# Patient Record
Sex: FEMALE | Race: OTHER | Employment: FULL TIME | URBAN - METROPOLITAN AREA
[De-identification: names, ages, dates, MRNs, and addresses within clinical notes are randomized per-mention and may not be internally consistent; named-entity substitution may affect disease eponyms.]

---

## 2017-01-18 ENCOUNTER — APPOINTMENT (OUTPATIENT)
Dept: OCCUPATIONAL MEDICINE | Age: 62
End: 2017-01-18
Attending: FAMILY MEDICINE

## 2020-03-08 ENCOUNTER — APPOINTMENT (OUTPATIENT)
Dept: GENERAL RADIOLOGY | Facility: HOSPITAL | Age: 65
End: 2020-03-08
Attending: EMERGENCY MEDICINE
Payer: COMMERCIAL

## 2020-03-08 ENCOUNTER — HOSPITAL ENCOUNTER (EMERGENCY)
Facility: HOSPITAL | Age: 65
Discharge: HOME | End: 2020-03-08
Attending: EMERGENCY MEDICINE
Payer: COMMERCIAL

## 2020-03-08 VITALS
WEIGHT: 139 LBS | OXYGEN SATURATION: 99 % | HEART RATE: 57 BPM | SYSTOLIC BLOOD PRESSURE: 184 MMHG | HEIGHT: 61 IN | BODY MASS INDEX: 26.24 KG/M2 | DIASTOLIC BLOOD PRESSURE: 82 MMHG | TEMPERATURE: 99 F | RESPIRATION RATE: 18 BRPM

## 2020-03-08 DIAGNOSIS — M51.36 DEGENERATIVE DISC DISEASE, LUMBAR: Primary | ICD-10-CM

## 2020-03-08 PROCEDURE — 99283 EMERGENCY DEPT VISIT LOW MDM: CPT

## 2020-03-08 PROCEDURE — 72100 X-RAY EXAM L-S SPINE 2/3 VWS: CPT | Performed by: EMERGENCY MEDICINE

## 2020-03-08 PROCEDURE — 96372 THER/PROPH/DIAG INJ SC/IM: CPT

## 2020-03-08 RX ORDER — IBUPROFEN 800 MG/1
800 TABLET ORAL EVERY 8 HOURS PRN
COMMUNITY
End: 2020-10-14

## 2020-03-08 RX ORDER — SIMVASTATIN 10 MG
10 TABLET ORAL NIGHTLY
COMMUNITY
End: 2020-03-23

## 2020-03-08 RX ORDER — METHYLPREDNISOLONE 4 MG/1
TABLET ORAL
Qty: 1 PACKAGE | Refills: 0 | Status: SHIPPED | OUTPATIENT
Start: 2020-03-08 | End: 2020-03-17 | Stop reason: ALTCHOICE

## 2020-03-08 RX ORDER — IBUPROFEN 600 MG/1
600 TABLET ORAL EVERY 8 HOURS PRN
Qty: 30 TABLET | Refills: 0 | Status: SHIPPED | OUTPATIENT
Start: 2020-03-08 | End: 2020-03-15

## 2020-03-08 RX ORDER — METOPROLOL TARTRATE 50 MG/1
50 TABLET, FILM COATED ORAL 2 TIMES DAILY
COMMUNITY
End: 2020-03-23

## 2020-03-08 RX ORDER — HYDROCODONE BITARTRATE AND ACETAMINOPHEN 5; 325 MG/1; MG/1
1-2 TABLET ORAL EVERY 8 HOURS PRN
Qty: 20 TABLET | Refills: 0 | Status: SHIPPED | OUTPATIENT
Start: 2020-03-08 | End: 2020-03-15

## 2020-03-08 RX ORDER — KETOROLAC TROMETHAMINE 30 MG/ML
60 INJECTION, SOLUTION INTRAMUSCULAR; INTRAVENOUS ONCE
Status: COMPLETED | OUTPATIENT
Start: 2020-03-08 | End: 2020-03-08

## 2020-03-08 RX ORDER — HYDROCODONE BITARTRATE AND ACETAMINOPHEN 5; 325 MG/1; MG/1
1-2 TABLET ORAL EVERY 6 HOURS PRN
Qty: 20 TABLET | Refills: 0 | Status: SHIPPED | OUTPATIENT
Start: 2020-03-08 | End: 2020-03-08

## 2020-03-08 NOTE — ED PROVIDER NOTES
Patient Seen in: [facility] Emergency Department      History   Patient presents with:  Lower Extremity Injury    Stated Complaint: L lower extremity pain    HPI    The patient is a 51-year-old female presenting to the emergency department due to pa fairly uncomfortable while trying to move around in the stretcher. Neuro: No focal neurologic deficit. No facial droop or slurred speech. CN II-XII intact.  Grossly normal and symmetric motor strength and sensation proximally and distally throughout 4 ex syndrome or neurologic deficits that would warrant emergent advanced imaging such as MRI. MDM           LUMBAR SPINE X-RAY 0356 HOURS  IMPRESSION:  No evidence of acute fracture or dislocation.     Near-complete disc interspace height loss noted at L Tylenol containing products. ). Qty: 20 tablet Refills: 0    !! - Potential duplicate medications found. Please discuss with provider.

## 2020-03-14 ENCOUNTER — HOSPITAL ENCOUNTER (OUTPATIENT)
Age: 65
Discharge: HOME | End: 2020-03-14
Attending: EMERGENCY MEDICINE
Payer: COMMERCIAL

## 2020-03-14 ENCOUNTER — APPOINTMENT (OUTPATIENT)
Dept: GENERAL RADIOLOGY | Age: 65
End: 2020-03-14
Attending: EMERGENCY MEDICINE
Payer: COMMERCIAL

## 2020-03-14 VITALS
HEIGHT: 61 IN | TEMPERATURE: 98 F | HEART RATE: 62 BPM | RESPIRATION RATE: 16 BRPM | DIASTOLIC BLOOD PRESSURE: 63 MMHG | BODY MASS INDEX: 26.24 KG/M2 | SYSTOLIC BLOOD PRESSURE: 171 MMHG | WEIGHT: 139 LBS | OXYGEN SATURATION: 96 %

## 2020-03-14 DIAGNOSIS — M54.9 SEVERE BACK PAIN: Primary | ICD-10-CM

## 2020-03-14 PROCEDURE — 99214 OFFICE O/P EST MOD 30 MIN: CPT

## 2020-03-14 PROCEDURE — 73562 X-RAY EXAM OF KNEE 3: CPT | Performed by: EMERGENCY MEDICINE

## 2020-03-14 PROCEDURE — 73502 X-RAY EXAM HIP UNI 2-3 VIEWS: CPT | Performed by: EMERGENCY MEDICINE

## 2020-03-14 RX ORDER — HYDROCODONE BITARTRATE AND ACETAMINOPHEN 5; 325 MG/1; MG/1
2 TABLET ORAL ONCE
Status: COMPLETED | OUTPATIENT
Start: 2020-03-14 | End: 2020-03-14

## 2020-03-14 RX ORDER — DIAZEPAM 2 MG/1
2 TABLET ORAL 3 TIMES DAILY PRN
Qty: 12 TABLET | Refills: 0 | Status: SHIPPED | OUTPATIENT
Start: 2020-03-14 | End: 2020-03-17 | Stop reason: DRUGHIGH

## 2020-03-14 RX ORDER — HYDROCODONE BITARTRATE AND ACETAMINOPHEN 5; 325 MG/1; MG/1
1-2 TABLET ORAL EVERY 6 HOURS PRN
Qty: 10 TABLET | Refills: 0 | Status: SHIPPED | OUTPATIENT
Start: 2020-03-14 | End: 2020-03-17 | Stop reason: DRUGHIGH

## 2020-03-14 RX ORDER — DIAZEPAM 5 MG/1
5 TABLET ORAL ONCE
Status: COMPLETED | OUTPATIENT
Start: 2020-03-14 | End: 2020-03-14

## 2020-03-14 NOTE — ED PROVIDER NOTES
Patient Seen in: [facility]      History   Patient presents with:  Back Pain    Stated Complaint: follow up on back pain from ER  03/08/2020    HPI    59-year-old woman history of hypertension, here for evaluation of back p elderly woman sitting in a wheelchair no acute distress  Head: Normocephalic and atraumatic. Mouth/Throat:  Mucous membranes are moist.   Cardiovascular:  Normal rate and regular rhythm.   No Edema  Pulmonary:  Pulmonary effort is normal.  Normal breath s this time. FINDINGS:  No acute displaced osseous fracture or dislocation. Near complete disc interspace height loss at L4-5. Mild disc space narrowing noted at T12-L1, L1-L2, and L3-L4. Mild to moderate facet arthropathy. Mild scoliosis.   Scattered discussed.               Disposition and Plan     Clinical Impression:  Severe back pain  (primary encounter diagnosis)    Disposition:  Discharge  3/14/2020  4:27 pm    Follow-up:  Anibal Mike  [address] [place] [address]  [phone]

## 2020-03-14 NOTE — ED INITIAL ASSESSMENT (HPI)
Pt. Here for follow-up to back pain. Was seen in ER 3/8/20. She reports taking her meds, without relief.

## 2020-03-16 ENCOUNTER — TELEPHONE (OUTPATIENT)
Dept: SURGERY | Facility: CLINIC | Age: 65
End: 2020-03-16

## 2020-03-16 NOTE — TELEPHONE ENCOUNTER
LM for patient regarding appt time change. Was seeing Reddy Tavares @ 6 but she has sx to attend. R/s to Mike @ 1pm-okd by Mike.  Asked patient to call back to confirm

## 2020-03-17 ENCOUNTER — OFFICE VISIT (OUTPATIENT)
Dept: SURGERY | Facility: CLINIC | Age: 65
End: 2020-03-17
Payer: COMMERCIAL

## 2020-03-17 VITALS
WEIGHT: 139 LBS | HEIGHT: 64.5 IN | BODY MASS INDEX: 23.44 KG/M2 | DIASTOLIC BLOOD PRESSURE: 90 MMHG | HEART RATE: 69 BPM | SYSTOLIC BLOOD PRESSURE: 170 MMHG

## 2020-03-17 DIAGNOSIS — M47.816 LUMBAR SPONDYLOSIS: Primary | ICD-10-CM

## 2020-03-17 DIAGNOSIS — M51.16 LUMBAR DISC HERNIATION WITH RADICULOPATHY: ICD-10-CM

## 2020-03-17 DIAGNOSIS — R29.898 WEAKNESS OF LEFT LOWER EXTREMITY: ICD-10-CM

## 2020-03-17 PROCEDURE — 99204 OFFICE O/P NEW MOD 45 MIN: CPT | Performed by: PHYSICIAN ASSISTANT

## 2020-03-17 RX ORDER — PREDNISONE 10 MG/1
10 TABLET ORAL DAILY
Qty: 30 TABLET | Refills: 0 | Status: SHIPPED | OUTPATIENT
Start: 2020-03-17 | End: 2020-03-23

## 2020-03-17 RX ORDER — DIAZEPAM 10 MG/1
10 TABLET ORAL EVERY 12 HOURS PRN
Qty: 60 TABLET | Refills: 0 | Status: SHIPPED | OUTPATIENT
Start: 2020-03-17 | End: 2020-03-31

## 2020-03-17 RX ORDER — CALCIUM CARBONATE 200(500)MG
1 TABLET,CHEWABLE ORAL AS NEEDED
COMMUNITY
End: 2020-06-12

## 2020-03-17 RX ORDER — HYDROCODONE BITARTRATE AND ACETAMINOPHEN 10; 325 MG/1; MG/1
1 TABLET ORAL EVERY 4 HOURS PRN
Qty: 60 TABLET | Refills: 0 | Status: SHIPPED | OUTPATIENT
Start: 2020-03-17 | End: 2020-03-31

## 2020-03-17 NOTE — PATIENT INSTRUCTIONS
Refill policies:    • Allow 2-3 business days for refills; controlled substances may take longer.   • Contact your pharmacy at least 5 days prior to running out of medication and have them send an electronic request or submit request through the “request re Depending on your insurance carrier, approval may take 3-10 days. It is highly recommended patients contact their insurance carrier directly to determine coverage.   If test is done without insurance authorization, patient may be responsible for the entire tablet she can take half to full tablet. 3.  If this does not cover pain will consider adding gabapentin  4. MRI lumbar spine. We can call her with the results are we can see her back in April to consider physical therapy or an epidural  5.   She can wor

## 2020-03-17 NOTE — PROGRESS NOTES
Location of Pain: lower back back left side radiating to knee and left foot. Pt states no numbness or tingling. No issues with weakness. No issues with B/B.      Date Pain Began: 1.5 weeks           Work Related:   No        Receiving Work Comp/Disability:

## 2020-03-17 NOTE — PROGRESS NOTES
[facility] Neurosurgery Consultation      HISTORY OF PRESENT Claude Nares is a 59year old female for spinal consultation. She has a history of left leg pain that developed about March 8, 2020 when getting out of bed.   She was seen in the [facility] 30 and toe walk. Negative Spurling's. Negative Vazquez's. Negative straight leg raise bilaterally. No pain on internal extra rotation of the hips.   She is tender along the left iliotibial band and greater trochanteric bursa but that does not reproduce her s will consider adding gabapentin  4. MRI lumbar spine. We can call her with the results are we can see her back in April to consider physical therapy or an epidural  5. She can work as tolerated    Images were reviewed her questions were answered    TAndreina  L

## 2020-03-22 ENCOUNTER — HOSPITAL ENCOUNTER (EMERGENCY)
Facility: HOSPITAL | Age: 65
Discharge: HOME | End: 2020-03-22
Attending: EMERGENCY MEDICINE
Payer: COMMERCIAL

## 2020-03-22 ENCOUNTER — APPOINTMENT (OUTPATIENT)
Dept: MRI IMAGING | Facility: HOSPITAL | Age: 65
End: 2020-03-22
Attending: EMERGENCY MEDICINE
Payer: COMMERCIAL

## 2020-03-22 VITALS
BODY MASS INDEX: 26.24 KG/M2 | SYSTOLIC BLOOD PRESSURE: 182 MMHG | HEIGHT: 61 IN | DIASTOLIC BLOOD PRESSURE: 82 MMHG | OXYGEN SATURATION: 97 % | RESPIRATION RATE: 15 BRPM | TEMPERATURE: 98 F | WEIGHT: 139 LBS | HEART RATE: 70 BPM

## 2020-03-22 DIAGNOSIS — M54.16 LUMBAR RADICULOPATHY: Primary | ICD-10-CM

## 2020-03-22 PROCEDURE — 96374 THER/PROPH/DIAG INJ IV PUSH: CPT

## 2020-03-22 PROCEDURE — 99284 EMERGENCY DEPT VISIT MOD MDM: CPT

## 2020-03-22 PROCEDURE — 72148 MRI LUMBAR SPINE W/O DYE: CPT | Performed by: EMERGENCY MEDICINE

## 2020-03-22 PROCEDURE — 96376 TX/PRO/DX INJ SAME DRUG ADON: CPT

## 2020-03-22 RX ORDER — MORPHINE SULFATE 4 MG/ML
4 INJECTION, SOLUTION INTRAMUSCULAR; INTRAVENOUS EVERY 30 MIN PRN
Status: DISCONTINUED | OUTPATIENT
Start: 2020-03-22 | End: 2020-03-22

## 2020-03-22 NOTE — ED NOTES
Pt reevaluated by er physician. Informed of all her test reports and plan of care.  Pt vebralizing understanding

## 2020-03-22 NOTE — ED PROVIDER NOTES
Patient Seen in: [facility] Emergency Department      History   Patient presents with:  Back Pain    Stated Complaint: Back pain, degenerative disc disease.      HPI    Patient presents complaining of increasing back pain with radiation down to her l wheezing. Abdominal:      General: Bowel sounds are normal. There is no distension. Palpations: Abdomen is soft. Tenderness: There is no tenderness. There is no guarding.    Neurological:      Mental Status: She is alert and oriented to person, follow up        Medications Prescribed:  Discharge Medication List as of 3/22/2020 12:12 PM

## 2020-03-22 NOTE — ED INITIAL ASSESSMENT (HPI)
Pt presents to ed with chronic back pain with radiation to left leg, pt was seen at [facility] yesterday where xray was unremarkable. Pt took norco at 0530 and is on prednisone.

## 2020-03-23 ENCOUNTER — HOSPITAL ENCOUNTER (OUTPATIENT)
Facility: HOSPITAL | Age: 65
Setting detail: OBSERVATION
Discharge: HOME | End: 2020-03-24
Attending: EMERGENCY MEDICINE | Admitting: HOSPITALIST
Payer: COMMERCIAL

## 2020-03-23 ENCOUNTER — TELEPHONE (OUTPATIENT)
Dept: NEUROLOGY | Facility: CLINIC | Age: 65
End: 2020-03-23

## 2020-03-23 DIAGNOSIS — M54.32 SCIATICA OF LEFT SIDE: ICD-10-CM

## 2020-03-23 DIAGNOSIS — M54.59 INTRACTABLE LOW BACK PAIN: Primary | ICD-10-CM

## 2020-03-23 DIAGNOSIS — R29.898 WEAKNESS OF LEFT LOWER EXTREMITY: ICD-10-CM

## 2020-03-23 RX ORDER — DOXEPIN HYDROCHLORIDE 50 MG/1
1 CAPSULE ORAL DAILY
Status: DISCONTINUED | OUTPATIENT
Start: 2020-03-24 | End: 2020-03-24

## 2020-03-23 RX ORDER — PREDNISONE 10 MG/1
20 TABLET ORAL DAILY
Status: ON HOLD | COMMUNITY
Start: 2020-03-26 | End: 2020-03-24

## 2020-03-23 RX ORDER — PREDNISONE 10 MG/1
30 TABLET ORAL DAILY
Status: ON HOLD | COMMUNITY
Start: 2020-03-23 | End: 2020-03-24

## 2020-03-23 RX ORDER — PREDNISONE 10 MG/1
10 TABLET ORAL DAILY
Status: ON HOLD | COMMUNITY
Start: 2020-03-28 | End: 2020-03-24

## 2020-03-23 RX ORDER — KETOROLAC TROMETHAMINE 30 MG/ML
30 INJECTION, SOLUTION INTRAMUSCULAR; INTRAVENOUS EVERY 6 HOURS PRN
Status: DISCONTINUED | OUTPATIENT
Start: 2020-03-23 | End: 2020-03-24

## 2020-03-23 RX ORDER — METOPROLOL SUCCINATE 50 MG/1
50 TABLET, EXTENDED RELEASE ORAL
Status: DISCONTINUED | OUTPATIENT
Start: 2020-03-24 | End: 2020-03-24

## 2020-03-23 RX ORDER — DIAZEPAM 5 MG/1
10 TABLET ORAL EVERY 12 HOURS PRN
Status: DISCONTINUED | OUTPATIENT
Start: 2020-03-23 | End: 2020-03-24

## 2020-03-23 RX ORDER — METOPROLOL SUCCINATE 50 MG/1
50 TABLET, EXTENDED RELEASE ORAL DAILY
COMMUNITY
End: 2020-06-26

## 2020-03-23 RX ORDER — MORPHINE SULFATE 4 MG/ML
4 INJECTION, SOLUTION INTRAMUSCULAR; INTRAVENOUS ONCE
Status: COMPLETED | OUTPATIENT
Start: 2020-03-23 | End: 2020-03-23

## 2020-03-23 RX ORDER — METOCLOPRAMIDE HYDROCHLORIDE 5 MG/ML
10 INJECTION INTRAMUSCULAR; INTRAVENOUS EVERY 8 HOURS PRN
Status: DISCONTINUED | OUTPATIENT
Start: 2020-03-23 | End: 2020-03-24

## 2020-03-23 RX ORDER — METHYLPREDNISOLONE SODIUM SUCCINATE 40 MG/ML
40 INJECTION, POWDER, LYOPHILIZED, FOR SOLUTION INTRAMUSCULAR; INTRAVENOUS EVERY 12 HOURS
Status: DISCONTINUED | OUTPATIENT
Start: 2020-03-23 | End: 2020-03-24

## 2020-03-23 RX ORDER — KETOROLAC TROMETHAMINE 15 MG/ML
15 INJECTION, SOLUTION INTRAMUSCULAR; INTRAVENOUS EVERY 6 HOURS PRN
Status: DISCONTINUED | OUTPATIENT
Start: 2020-03-23 | End: 2020-03-24

## 2020-03-23 RX ORDER — MULTIVITAMIN WITH FOLIC ACID 400 MCG
1 TABLET ORAL DAILY
COMMUNITY

## 2020-03-23 RX ORDER — HYDROCODONE BITARTRATE AND ACETAMINOPHEN 10; 325 MG/1; MG/1
1 TABLET ORAL EVERY 4 HOURS PRN
Status: DISCONTINUED | OUTPATIENT
Start: 2020-03-23 | End: 2020-03-24

## 2020-03-23 RX ORDER — ONDANSETRON 2 MG/ML
4 INJECTION INTRAMUSCULAR; INTRAVENOUS EVERY 6 HOURS PRN
Status: DISCONTINUED | OUTPATIENT
Start: 2020-03-23 | End: 2020-03-24

## 2020-03-23 NOTE — TELEPHONE ENCOUNTER
Provider message noted. Placed reminder for need for new imaging order in 30 days, (and possibly reevaluate need for peer to peer).       At [address] [place] [address] on 3/17/20 per CIARRA Adler:    \"ASSESSMENT:  1. L4-5 L5-S1 spondylosis  2.   Left leg radiculopathy L

## 2020-03-23 NOTE — TELEPHONE ENCOUNTER
Prior auth for lumbar MRI [id number] Denied     Patient has scheduled this MRI on 03-    Case# [id number]    In the event that your physician or health care professional (provider) continues to disagree with this non-coverage decision, he/she may call to [phone] Expedited (urgent) appeal fax: [phone]

## 2020-03-23 NOTE — TELEPHONE ENCOUNTER
Let's wait on the noij-al-wwqhp for approval. In general, authorization is only good for thirty days. The authorization may  before the ability to resume normal operations.

## 2020-03-24 VITALS
TEMPERATURE: 98 F | DIASTOLIC BLOOD PRESSURE: 81 MMHG | SYSTOLIC BLOOD PRESSURE: 162 MMHG | HEART RATE: 70 BPM | OXYGEN SATURATION: 98 % | RESPIRATION RATE: 22 BRPM

## 2020-03-24 PROCEDURE — 99217 OBSERVATION CARE DISCHARGE: CPT | Performed by: HOSPITALIST

## 2020-03-24 RX ORDER — PREDNISONE 20 MG/1
40 TABLET ORAL DAILY
Qty: 8 TABLET | Refills: 0 | Status: SHIPPED | OUTPATIENT
Start: 2020-03-24 | End: 2020-03-28

## 2020-03-24 NOTE — PROGRESS NOTES
NURSING ADMISSION NOTE      Patient admitted via CART  Oriented to room. Safety precautions initiated. Bed in low position. Call light in reach.

## 2020-03-24 NOTE — PHYSICAL THERAPY NOTE
PHYSICAL THERAPY QUICK EVALUATION - INPATIENT    Room Number: 522/522-A  Evaluation Date: 3/24/2020  Presenting Problem: intractable back and left LE pain  Physician Order: PT Eval and Treat    Problem List  Principal Problem:    Intractable low back mable None   -   Moving from lying on back to sitting on the side of the bed?: None   How much help from another person does the patient currently need. ..   -   Moving to and from a bed to a chair (including a wheelchair)?: None   -   Need to walk in hospital ro educated in frequent position changes, cont of ankle pumps for DVT prevention, and activity recommendations. Pt able to adhere to spine precautions with all activity, and demonstrates good understanding of above.     .  Based on this evaluation, patient's cl

## 2020-03-24 NOTE — ED PROVIDER NOTES
Patient Seen in: [facility] Emergency Department      History   Patient presents with:  Back Pain    Stated Complaint: back pain/left leg pain    HPI    79-year-old female presents emergency department who states was seen in the emergency department rub  Respiratory: Clear to auscultation bilaterally no crackles no wheezes no accessory muscle use  Abdomen: Soft nontender nondistended, no rebound no guarding  no hepatosplenomegaly bowel sounds are present , no pulsatile mass  Extremities: No clubbing c

## 2020-03-24 NOTE — PLAN OF CARE
States pain adequately controlled. Denies need for pain medication today. Sitting up in chair, participated with physical therapy. States tolerated activity well. States ready to go home today.   Instructed on plan of care, pain management, call for all

## 2020-03-24 NOTE — H&P
[facility]  History and Physical     Mannfordyemi Barnett Patient Status:  Observation    10/22/1955 MRN [id number]   [facility] 5NW-A Attending Beverley Dennis MD   Hosp Day # 0 PCP Kelsea Suh MD     Chief Complaint: Catherine Patterson Disp: , Rfl:   Calcium Carbonate Antacid 500 MG Oral Chew Tab, Chew 1 tablet by mouth as needed. , Disp: , Rfl:   Olive Leaf Extract 500 MG Oral Cap, Take 500 mg by mouth daily.   , Disp: , Rfl:   HYDROcodone-acetaminophen (NORCO)  MG Oral Tab, Take 1 for input(s): GLU, BUN, CREATSERUM, GFRAA, GFRNAA, CA, ALB, NA, K, CL, CO2, ALKPHO, AST, ALT, BILT, TP in the last 168 hours. CrCl cannot be calculated (No successful lab value found. ). No results for input(s): PTP, INR in the last 168 hours.     No r

## 2020-03-24 NOTE — PROGRESS NOTES
[facility]  Progress Note     Abi Render Patient Status:  Observation    10/22/1955 MRN [id number]   [facility] 5NW-A Attending Lewis Guerin MD   Hosp Day # 0 PCP Christina Rajput MD     Chief Complaint: back pain    S: lumbar spine and left paracentral disc sequestration at L3-L4 and moderate right neural foraminal stenosis L4-L5   - steroids   - Pain meds   - antiinflamm  2.   Hypertension stable on home medicines       Plan of care: dc home    Quality:  · DVT Prophylaxi

## 2020-03-24 NOTE — ED INITIAL ASSESSMENT (HPI)
Pt c/o of back pain. Pt was seen in ED this past week for pain. Pt stated she has scheduled MRI on Wednesday. Pt stated she took her prednisone and motrin around 1730 this evening. Pt is aox4, laying on stretcher.

## 2020-03-24 NOTE — PLAN OF CARE
States ready to go home. Has denied need for pain medication today. States tolerating activity well. Verbal and written discharge orders reviewed. Verbalized understanding.   Discharged with prescription for prednisone & walker issued by physical therap

## 2020-03-24 NOTE — PROGRESS NOTES
[facility] [address]  [place], [address]  ?  03/24/20  ? Re: Caridad GibbonsFlorina  ? To Whom It May Concern:    Caridad Heaton was admitted to [facility] from 3/23/2020 to 03/24/20.     Please excuse Caridad Heaton

## 2020-03-24 NOTE — PLAN OF CARE
V/S stable,B/P elevated,will monitor,better now than previous,pain medication was given. Plan of care discussed,Instructed to call for help or assist,safety measures discussed. Will monitor.

## 2020-03-26 ENCOUNTER — TELEPHONE (OUTPATIENT)
Dept: SURGERY | Facility: CLINIC | Age: 65
End: 2020-03-26

## 2020-03-26 ENCOUNTER — HOSPITAL ENCOUNTER (EMERGENCY)
Facility: HOSPITAL | Age: 65
Discharge: HOME | End: 2020-03-26
Attending: EMERGENCY MEDICINE
Payer: COMMERCIAL

## 2020-03-26 VITALS
SYSTOLIC BLOOD PRESSURE: 164 MMHG | OXYGEN SATURATION: 98 % | RESPIRATION RATE: 20 BRPM | WEIGHT: 139 LBS | HEART RATE: 70 BPM | DIASTOLIC BLOOD PRESSURE: 88 MMHG | HEIGHT: 65 IN | TEMPERATURE: 97 F | BODY MASS INDEX: 23.16 KG/M2

## 2020-03-26 DIAGNOSIS — M54.16 LUMBAR RADICULOPATHY: Primary | ICD-10-CM

## 2020-03-26 PROCEDURE — 96372 THER/PROPH/DIAG INJ SC/IM: CPT

## 2020-03-26 PROCEDURE — 99283 EMERGENCY DEPT VISIT LOW MDM: CPT

## 2020-03-26 PROCEDURE — 99285 EMERGENCY DEPT VISIT HI MDM: CPT

## 2020-03-26 RX ORDER — GABAPENTIN 300 MG/1
300 CAPSULE ORAL 3 TIMES DAILY
Qty: 90 CAPSULE | Refills: 1 | Status: SHIPPED | OUTPATIENT
Start: 2020-03-26 | End: 2020-05-18

## 2020-03-26 RX ORDER — DEXAMETHASONE SODIUM PHOSPHATE 4 MG/ML
10 VIAL (ML) INJECTION ONCE
Status: COMPLETED | OUTPATIENT
Start: 2020-03-26 | End: 2020-03-26

## 2020-03-26 RX ORDER — DIAZEPAM 5 MG/1
5 TABLET ORAL ONCE
Status: DISCONTINUED | OUTPATIENT
Start: 2020-03-26 | End: 2020-03-26

## 2020-03-26 RX ORDER — MORPHINE SULFATE 4 MG/ML
4 INJECTION, SOLUTION INTRAMUSCULAR; INTRAVENOUS ONCE
Status: DISCONTINUED | OUTPATIENT
Start: 2020-03-26 | End: 2020-03-26

## 2020-03-26 RX ORDER — ACETAMINOPHEN 500 MG
1000 TABLET ORAL ONCE
Status: COMPLETED | OUTPATIENT
Start: 2020-03-26 | End: 2020-03-26

## 2020-03-26 RX ORDER — DIAZEPAM 5 MG/1
5 TABLET ORAL ONCE
Status: COMPLETED | OUTPATIENT
Start: 2020-03-26 | End: 2020-03-26

## 2020-03-26 RX ORDER — KETOROLAC TROMETHAMINE 30 MG/ML
30 INJECTION, SOLUTION INTRAMUSCULAR; INTRAVENOUS ONCE
Status: COMPLETED | OUTPATIENT
Start: 2020-03-26 | End: 2020-03-26

## 2020-03-26 RX ORDER — MORPHINE SULFATE 4 MG/ML
4 INJECTION, SOLUTION INTRAMUSCULAR; INTRAVENOUS ONCE
Status: COMPLETED | OUTPATIENT
Start: 2020-03-26 | End: 2020-03-26

## 2020-03-26 NOTE — TELEPHONE ENCOUNTER
Message sent to provider regarding patient's Fredis Gibbs which is scheduled for 3/31/20. Per CIARRA Melendez, patient is to be seen again:  [facility] May.  I think we will be seeing limited patients throughout April\"      Routed to [facility] to make late May appoi

## 2020-03-26 NOTE — TELEPHONE ENCOUNTER
Patient returned call. Informed her of the new med order and information and instructions provided by CIARRA Salazar. Patient inquired about her scheduled OV to be on 3/31/20 with CIARRA Salazar.   Informed patient that we will contact her wit

## 2020-03-26 NOTE — ED PROVIDER NOTES
Patient Seen in: [facility] Emergency Department      History   Patient presents with:  Pain    Stated Complaint: c/o L hip pain, fell out of  bed 2 days ago, states no relief from pain meds at*    HPI    They patient is a 75-year-old female here fo Normocephalic and atraumatic. Eyes: Conjunctivae are normal. Pupils are equal, round, and reactive to light. Neck: Normal range of motion. Neck supple. Cardiovascular: Normal rate, regular rhythm, normal heart sounds and intact distal pulses.     Pulm acute LLE weakness  TECHNIQUE:  Multiplanar T1 and T2 weighted images including fat suppression sequences. Images acquired in sagittal and axial planes.    PATIENT STATED HISTORY: (As transcribed by Technologist)  The patient was working, felt a pop in the spinal canal stenosis. Moderate right neural foraminal stenosis. L5-S1:  Diffuse disc bulge with endplate osteophytes and a superimposed left paracentral/foraminal disc protrusion. Mild facet arthropathy. No significant spinal canal stenosis.   Mild lef Xr Hip W Or Wo Pelvis 2 Or 3 Views, Left (cpt=73502)    Result Date: 3/14/2020  PROCEDURE:  XR HIP W OR WO PELVIS 2 OR 3 VIEWS, LEFT (CPT=73502)  TECHNIQUE:  Unilateral 2 to 3 views of the hip and pelvis if performed. COMPARISON:  None.   INDICATION

## 2020-03-26 NOTE — TELEPHONE ENCOUNTER
Spoke with patient who stated that:    -nothing is helping with the pain except the morphine which she had been given in the ER.   -when pain is at it's worst, it is 10/10, and is currently 6/10 while she is speaking on the phone.  -pain located in lower ba

## 2020-03-26 NOTE — ED INITIAL ASSESSMENT (HPI)
PT c/o L hip pain radiating down leg s/p fall on 3/8/2020. PT taking medications at home with no relief. PT seen at ER and IC since.

## 2020-03-30 ENCOUNTER — TELEPHONE (OUTPATIENT)
Dept: SURGERY | Facility: CLINIC | Age: 65
End: 2020-03-30

## 2020-03-30 NOTE — TELEPHONE ENCOUNTER
Called got , requested patient call us back. We will do an evisit Tuesday as discussed earlier. Patient doesn't appear to have confirmed this.

## 2020-03-31 ENCOUNTER — VIRTUAL PHONE E/M (OUTPATIENT)
Dept: SURGERY | Facility: CLINIC | Age: 65
End: 2020-03-31
Payer: COMMERCIAL

## 2020-03-31 DIAGNOSIS — R29.898 WEAKNESS OF LEFT LOWER EXTREMITY: ICD-10-CM

## 2020-03-31 DIAGNOSIS — M47.816 LUMBAR SPONDYLOSIS: Primary | ICD-10-CM

## 2020-03-31 DIAGNOSIS — M51.16 LUMBAR DISC HERNIATION WITH RADICULOPATHY: ICD-10-CM

## 2020-03-31 PROCEDURE — 99214 OFFICE O/P EST MOD 30 MIN: CPT | Performed by: PHYSICIAN ASSISTANT

## 2020-03-31 RX ORDER — HYDROCODONE BITARTRATE AND ACETAMINOPHEN 10; 325 MG/1; MG/1
1 TABLET ORAL EVERY 4 HOURS PRN
Qty: 90 TABLET | Refills: 0 | Status: SHIPPED | OUTPATIENT
Start: 2020-03-31 | End: 2020-04-21

## 2020-03-31 RX ORDER — DIAZEPAM 10 MG/1
10 TABLET ORAL EVERY 12 HOURS PRN
Qty: 60 TABLET | Refills: 1 | Status: SHIPPED | OUTPATIENT
Start: 2020-03-31 | End: 2020-06-12 | Stop reason: ALTCHOICE

## 2020-03-31 NOTE — PROGRESS NOTES
Virtual/Telephone Check-In    Lillian Terrell verbally consents to a Virtual/Telephone Check-In service on 03/31/20.   Patient understands and accepts financial responsibility for any deductible, co-insurance and/or co-pays associated with this servic hypertension        PAST SURGICAL HISTORY:  Past Surgical History:   Procedure Laterality Date   • COLON SURGERY         FAMILY HISTORY:  family history is not on file. SOCIAL HISTORY:   reports that she has never smoked.  She has never used smokeless to 1+       1+           Left      1+           1+       1+        1+       1+              IMAGING:  MRI LS 3/22/2020 New left L3-4 HNP and left far lateral hnp. L4-5 severe DDD with bilateral FS.  L5-S1 DDD    X-ray of the hip 3/14/2020 was normal, with norm

## 2020-03-31 NOTE — PROGRESS NOTES
PT order, SOLITARIO Order, & Work Note placed in envelope @  filing cabinet for pt to . Patient will come in tomorrow for PT, SOLITARIO order and work note.  Thanks    Routing comment

## 2020-04-01 NOTE — PROGRESS NOTES
Patient picked up orders, also requested RTW letter be faxed to [employer], her employers, at [phone]; letter faxed, confirmation received

## 2020-04-03 ENCOUNTER — HOSPITAL ENCOUNTER (EMERGENCY)
Facility: HOSPITAL | Age: 65
Discharge: HOME | End: 2020-04-03
Attending: EMERGENCY MEDICINE
Payer: COMMERCIAL

## 2020-04-03 VITALS
OXYGEN SATURATION: 97 % | WEIGHT: 139 LBS | DIASTOLIC BLOOD PRESSURE: 91 MMHG | HEART RATE: 72 BPM | BODY MASS INDEX: 23.73 KG/M2 | RESPIRATION RATE: 17 BRPM | HEIGHT: 64 IN | TEMPERATURE: 98 F | SYSTOLIC BLOOD PRESSURE: 174 MMHG

## 2020-04-03 DIAGNOSIS — M54.40 CHRONIC LOW BACK PAIN WITH SCIATICA, SCIATICA LATERALITY UNSPECIFIED, UNSPECIFIED BACK PAIN LATERALITY: Primary | ICD-10-CM

## 2020-04-03 DIAGNOSIS — G89.29 CHRONIC LOW BACK PAIN WITH SCIATICA, SCIATICA LATERALITY UNSPECIFIED, UNSPECIFIED BACK PAIN LATERALITY: Primary | ICD-10-CM

## 2020-04-03 PROCEDURE — 99283 EMERGENCY DEPT VISIT LOW MDM: CPT

## 2020-04-03 RX ORDER — HYDROMORPHONE HYDROCHLORIDE 1 MG/ML
1 INJECTION, SOLUTION INTRAMUSCULAR; INTRAVENOUS; SUBCUTANEOUS ONCE
Status: COMPLETED | OUTPATIENT
Start: 2020-04-03 | End: 2020-04-03

## 2020-04-04 ENCOUNTER — TELEPHONE (OUTPATIENT)
Dept: FAMILY MEDICINE CLINIC | Facility: CLINIC | Age: 65
End: 2020-04-04

## 2020-04-04 NOTE — ED NOTES
Writer contacted pt's daughter per pt's request - daughter/Chadd told she needs to be in a7 before we can medicate her mother per ermd order. Daughter states she has to find sitter for her daughter and will call us with eta.

## 2020-04-04 NOTE — ED PROVIDER NOTES
Patient Seen in: [facility] Emergency Department      History   Patient presents with:  Back Pain    Stated Complaint: chronic back pain, leg numbness, fall today    HPI  Patient 29-year-old woman with chronic back pain chronic left-sided hip and ba She is not toxic-appearing. HENT:      Head: Normocephalic and atraumatic. Eyes:      General: No scleral icterus. Conjunctiva/sclera: Conjunctivae normal.   Neck:      Musculoskeletal: Normal range of motion and neck supple.    Cardiovascular:

## 2020-04-04 NOTE — ED NOTES
Pt ambulated to bathroom with her walker - tolerated well. Pt notified to tell her daughter/ride to come to a6 before ermd will approve of medicating her.

## 2020-04-04 NOTE — ED INITIAL ASSESSMENT (HPI)
Pt ambulated to er with her walker from home   States daughter dropped her off   Pt reports chronic left hip to left leg pain since 1500. States left leg gave out at 1700 = had to stay in bed /denies fall.   Took norco, valium& ibuprofen today for her left

## 2020-04-04 NOTE — TELEPHONE ENCOUNTER
Incoming page, at 1000: Called pt, no answer. Left message on VM stating that on-call doctor does not schedule appointments and that she please call back during normal business hours, M-F [phone] for an appt.   Advised if she has severe pain, then to please

## 2020-04-07 ENCOUNTER — TELEPHONE (OUTPATIENT)
Dept: SURGERY | Facility: CLINIC | Age: 65
End: 2020-04-07

## 2020-04-07 DIAGNOSIS — M47.816 LUMBAR SPONDYLOSIS: Primary | ICD-10-CM

## 2020-04-07 DIAGNOSIS — M51.16 LUMBAR DISC HERNIATION WITH RADICULOPATHY: ICD-10-CM

## 2020-04-07 PROCEDURE — 99213 OFFICE O/P EST LOW 20 MIN: CPT | Performed by: PHYSICIAN ASSISTANT

## 2020-04-07 NOTE — TELEPHONE ENCOUNTER
Per CIARRA Escamilla at [address] [place] [address] on 3/31/20:    \"ASSESSMENT:  1. L4-5 severe DDD with bilateral FS, L5-S1 spondylosis  2. Left leg radiculopathy L4 possibly L5 dermatome  3. Mild left leg weakness  4.   Left greater trochanteric bursa and iliot

## 2020-04-07 NOTE — TELEPHONE ENCOUNTER
Called patient she has Norco valium and gabapentin    PT was ordered she can check for who is still operating written at last e visit 3.31    Pain referral for [facility] written 3.31    SHe would like the orders I recommended she set up a [employer] account to have

## 2020-04-08 ENCOUNTER — PATIENT MESSAGE (OUTPATIENT)
Dept: SURGERY | Facility: CLINIC | Age: 65
End: 2020-04-08

## 2020-04-08 DIAGNOSIS — R29.898 WEAKNESS OF LEFT LOWER EXTREMITY: ICD-10-CM

## 2020-04-08 DIAGNOSIS — M47.816 LUMBAR SPONDYLOSIS: Primary | ICD-10-CM

## 2020-04-08 DIAGNOSIS — M51.16 LUMBAR DISC HERNIATION WITH RADICULOPATHY: ICD-10-CM

## 2020-04-08 NOTE — TELEPHONE ENCOUNTER
Brian are in. She has now has [address] Orders will be there for her to print.   [address] message sent

## 2020-04-08 NOTE — TELEPHONE ENCOUNTER
Patient called stating she needs to have steroid injection, does not want to wait. States she found that [facility] will perform steroid injections, but needs an order.      Patient states she has not had back pain for 2 weeks, all pain has migrated t

## 2020-04-08 NOTE — TELEPHONE ENCOUNTER
Noted [employer] message per CIARRA Melendez:    \"Orders can be seen on [employer] and printed. Pain clinic order and physical therapy orders were placed yesterday. \"    Orders Tab reveals \"OP referral to THE [facility] PT/rehab\", and \"OP referral to pain manage

## 2020-04-09 NOTE — TELEPHONE ENCOUNTER
From: Mally Bright  To: Paolo Santizo [place]  Sent: 4/8/2020 10:24 PM CDT  Subject: Other    Excruciating pain for days. No sleep. Only found ortho that will do injection in back will not do without order. il I know you have other committment . Magalys Spar but

## 2020-04-16 ENCOUNTER — TELEPHONE (OUTPATIENT)
Dept: SURGERY | Facility: CLINIC | Age: 65
End: 2020-04-16

## 2020-04-16 NOTE — TELEPHONE ENCOUNTER
Patient indicating that medication that Jeana Thacker prescribed is not working at all. Patient requested for us to ask Jeana Thacker if she can come in for an appointment   Please advise.   Please leave VM if I don't answer DISPLAY PLAN FREE TEXT

## 2020-04-16 NOTE — TELEPHONE ENCOUNTER
I have talked with her several times. I can see her Friday.     She was to get an injection from outside [facility]

## 2020-04-17 NOTE — TELEPHONE ENCOUNTER
Called patient regarding appointment for Friday 4/17. Patient indicated that she didn't feel well so she didn't listen to any voice mails that were on her phone. She is wondering if she could come in sometime next week?

## 2020-04-20 RX ORDER — GABAPENTIN 300 MG/1
300 CAPSULE ORAL 3 TIMES DAILY
Qty: 90 CAPSULE | Refills: 2 | Status: CANCELLED | OUTPATIENT
Start: 2020-04-20

## 2020-04-20 NOTE — TELEPHONE ENCOUNTER
Medication: Gabapentin 300 mg    Date of last refill: 3/26/20  [place]    Last office visit: virtual tele-visit 3/31/2020  Due back to clinic per last office note:  JORGE KELLY May/Edelmira  Date next office visit scheduled:

## 2020-04-21 ENCOUNTER — OFFICE VISIT (OUTPATIENT)
Dept: SURGERY | Facility: CLINIC | Age: 65
End: 2020-04-21
Payer: COMMERCIAL

## 2020-04-21 ENCOUNTER — TELEPHONE (OUTPATIENT)
Dept: SURGERY | Facility: CLINIC | Age: 65
End: 2020-04-21

## 2020-04-21 VITALS — DIASTOLIC BLOOD PRESSURE: 98 MMHG | SYSTOLIC BLOOD PRESSURE: 150 MMHG | HEART RATE: 80 BPM

## 2020-04-21 DIAGNOSIS — R29.898 WEAKNESS OF LEFT LOWER EXTREMITY: ICD-10-CM

## 2020-04-21 DIAGNOSIS — M47.816 LUMBAR SPONDYLOSIS: Primary | ICD-10-CM

## 2020-04-21 DIAGNOSIS — M51.16 LUMBAR DISC HERNIATION WITH RADICULOPATHY: ICD-10-CM

## 2020-04-21 PROCEDURE — 99214 OFFICE O/P EST MOD 30 MIN: CPT | Performed by: PHYSICIAN ASSISTANT

## 2020-04-21 RX ORDER — HYDROCODONE BITARTRATE AND ACETAMINOPHEN 10; 325 MG/1; MG/1
1 TABLET ORAL EVERY 4 HOURS PRN
Qty: 90 TABLET | Refills: 0 | Status: SHIPPED | OUTPATIENT
Start: 2020-04-21 | End: 2020-06-12 | Stop reason: ALTCHOICE

## 2020-04-21 RX ORDER — GABAPENTIN 300 MG/1
300 CAPSULE ORAL 3 TIMES DAILY
Qty: 270 CAPSULE | Refills: 0 | Status: CANCELLED | OUTPATIENT
Start: 2020-04-21

## 2020-04-21 NOTE — PROGRESS NOTES
Patient here to discuss pain management options. Patient states her pain is the left thigh and knee at a level 8/10. Patient took Tylenol and Gabapentin today with some minor relief.       Patient states that she is not able to eat or sleep because of p

## 2020-04-21 NOTE — TELEPHONE ENCOUNTER
Patient currently has gabapentin to get her through mid May with a refill to get her through till June we will reevaluate her need for a 90-day supply of gabapentin when she returns May 28

## 2020-04-21 NOTE — TELEPHONE ENCOUNTER
Message from provider regarding Gabapentin denial from insurance noted. Will revisit issue after NOV in May. Reminder set in nursing tab.

## 2020-04-21 NOTE — PROGRESS NOTES
[facility]   Follow up      Fany Carl Lux is a 59year old here in follow-up. 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 She is working with some help from her colleagues.   If she takes any time in this capacity as she is here. She denies cervical pain cervical radiculopathy. She denies any numbness tingling or weakness in the arms. She denies thoracic pain.   She denies midline back pain she planes of left buttock pain left lateral leg pain t walker today. She has some tenderness over the left greater trochanteric bursa and along the left IT band as well.     Upper extremity strength: Last exam      Deltoid    Triceps     Biceps        Wrist Extension  Finger extension Thumb Abduction  Thum

## 2020-04-21 NOTE — PATIENT INSTRUCTIONS
Refill policies:    • Allow 2-3 business days for refills; controlled substances may take longer.   • Contact your pharmacy at least 5 days prior to running out of medication and have them send an electronic request or submit request through the “request re Depending on your insurance carrier, approval may take 3-10 days. It is highly recommended patients contact their insurance carrier directly to determine coverage.   If test is done without insurance authorization, patient may be responsible for the entire as tolerated  5. FU Dr. Minnie Koch in 6 weeks if she is had physical therapy and an epidural and failed conservative care will discuss possible foraminotomies. She would like to have surgery as a last resort.

## 2020-04-21 NOTE — TELEPHONE ENCOUNTER
90-day supply of gabapentin denied.   She has medication to get her through the next appointment which would be May 28 we will reevaluate a 90-day supply at that point

## 2020-04-22 ENCOUNTER — TELEPHONE (OUTPATIENT)
Dept: PAIN CLINIC | Facility: CLINIC | Age: 65
End: 2020-04-22

## 2020-04-22 ENCOUNTER — VIRTUAL PHONE E/M (OUTPATIENT)
Dept: PAIN CLINIC | Facility: CLINIC | Age: 65
End: 2020-04-22
Payer: COMMERCIAL

## 2020-04-22 DIAGNOSIS — M54.16 LUMBAR RADICULOPATHY: ICD-10-CM

## 2020-04-22 DIAGNOSIS — M54.16 LUMBAR RADICULOPATHY: Primary | ICD-10-CM

## 2020-04-22 DIAGNOSIS — M54.59 INTRACTABLE LOW BACK PAIN: ICD-10-CM

## 2020-04-22 DIAGNOSIS — M54.32 SCIATICA OF LEFT SIDE: Primary | ICD-10-CM

## 2020-04-22 PROCEDURE — 99202 OFFICE O/P NEW SF 15 MIN: CPT | Performed by: ANESTHESIOLOGY

## 2020-04-22 NOTE — PROGRESS NOTES
Virtual Telephone Check-In    John Barnett verbally consents to a Virtual/Telephone Check-In visit on 04/22/20.     Patient understands and accepts financial responsibility for any deductible, co-insurance and/or co-pays associated with this servic

## 2020-04-22 NOTE — TELEPHONE ENCOUNTER
Prior authorization request for Left TLESI ([id number]) submitted via [facility] online, immediate response received:      Notification or Prior Authorization is not required for the requested services    You are not required to submit a notification/prior authorizati

## 2020-04-22 NOTE — TELEPHONE ENCOUNTER
Medical clearance needed- no    Implanted cardiac device/SCS/PNS: n/a    Pt seen in OV today by Dr. Arely Santillan and recommended for TLESI (X 1). Please begin PA process for procedure(s).      Laterality: Left  Level(s): L3-4    Pt informed callback will be given

## 2020-05-04 ENCOUNTER — OFFICE VISIT (OUTPATIENT)
Dept: PHYSICAL THERAPY | Facility: HOSPITAL | Age: 65
End: 2020-05-04
Attending: PHYSICIAN ASSISTANT
Payer: COMMERCIAL

## 2020-05-04 DIAGNOSIS — M51.16 LUMBAR DISC HERNIATION WITH RADICULOPATHY: ICD-10-CM

## 2020-05-04 DIAGNOSIS — M47.816 LUMBAR SPONDYLOSIS: ICD-10-CM

## 2020-05-04 DIAGNOSIS — R29.898 WEAKNESS OF LEFT LOWER EXTREMITY: ICD-10-CM

## 2020-05-04 PROCEDURE — 97162 PT EVAL MOD COMPLEX 30 MIN: CPT

## 2020-05-04 PROCEDURE — 97110 THERAPEUTIC EXERCISES: CPT

## 2020-05-04 NOTE — PROGRESS NOTES
INITIAL EVALUATION:   Referring Physician: Dr. 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     PMH/PSH: HTN, colon surgery    Functional Status/General Fitness (current vs past): Pt reports that prior to 2/28/2020 she was active and healthy participating in biking for general fitness activity, no history of hypo    Special tests:   Repeated Motions Testing: -  Shift Correction: assess next session  Long Axis Distraction: assess next session    Neurodynamic:   Slump -  SLR: + L for pain, not sensitized  Femoral N: + L    Today’s Treatment and Response:   Pt ed begin general exercise program for mobility. 2. Pt will improve quad strength to 5/5 to assist with sit to stand. 3. Pt will tolerate 20 minutes of prolonged sitting without reproduction of smptoms.   4. Pt will demonstrate >10 sec SLS on L to reduce fal

## 2020-05-07 ENCOUNTER — OFFICE VISIT (OUTPATIENT)
Dept: PHYSICAL THERAPY | Facility: HOSPITAL | Age: 65
End: 2020-05-07
Attending: PHYSICIAN ASSISTANT
Payer: COMMERCIAL

## 2020-05-07 PROCEDURE — 97140 MANUAL THERAPY 1/> REGIONS: CPT

## 2020-05-07 PROCEDURE — 97110 THERAPEUTIC EXERCISES: CPT

## 2020-05-07 NOTE — PROGRESS NOTES
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 Barbra Bowen TX#: 5/ Date:    Tx#: 6/   MANUAL:  Long Axis Traction 1' x4 sustain  Lumbar CPA Mob L3-4 GIII x 5'  STM Lumbar Paraspinals x 5' L       THEREX:  Nustep L4 x 7'  Supine March x 15 L  Supine ABD/ADD in hooklying manual resist 2 x 10       NMREED:

## 2020-05-08 DIAGNOSIS — Z01.812 PRE-PROCEDURAL LABORATORY EXAMINATION: Primary | ICD-10-CM

## 2020-05-08 NOTE — TELEPHONE ENCOUNTER
Patient had additional questions about COVID test, answered all questions for patient, patient appreciative, no further needs.

## 2020-05-08 NOTE — TELEPHONE ENCOUNTER
Patient advised  is now performing elective procedures, patient agreeable to rescheduling. Patient confirmed active [email or web address], no changes to insurance. Active authorization is on file.       Patient advised they will be required to hav ? Please note-No prescriptions will be written by Pain Clinic in [place] on the day of procedure. If you require a refill of medications, please contact the office 48 hours prior to your procedure.   ? If you have an implanted Spinal Cord or Peripheral Nerve Sti Please contact your insurance carrier to determine what your financial responsibility will be for the procedure(s).       Cancellation/Rescheduling Appointment:   In the event you need to cancel or reschedule your appointment, you must notify the office 24

## 2020-05-09 ENCOUNTER — LAB ENCOUNTER (OUTPATIENT)
Dept: LAB | Facility: HOSPITAL | Age: 65
End: 2020-05-09
Attending: EMERGENCY MEDICINE
Payer: COMMERCIAL

## 2020-05-09 DIAGNOSIS — Z01.812 PRE-PROCEDURE LAB EXAM: Primary | ICD-10-CM

## 2020-05-09 DIAGNOSIS — Z01.812 PRE-PROCEDURAL LABORATORY EXAMINATION: ICD-10-CM

## 2020-05-11 ENCOUNTER — APPOINTMENT (OUTPATIENT)
Dept: GENERAL RADIOLOGY | Facility: HOSPITAL | Age: 65
End: 2020-05-11
Attending: ANESTHESIOLOGY
Payer: COMMERCIAL

## 2020-05-11 ENCOUNTER — HOSPITAL ENCOUNTER (OUTPATIENT)
Facility: HOSPITAL | Age: 65
Setting detail: HOSPITAL OUTPATIENT SURGERY
Discharge: HOME | End: 2020-05-11
Attending: ANESTHESIOLOGY | Admitting: ANESTHESIOLOGY
Payer: COMMERCIAL

## 2020-05-11 VITALS
HEART RATE: 57 BPM | OXYGEN SATURATION: 99 % | SYSTOLIC BLOOD PRESSURE: 200 MMHG | TEMPERATURE: 97 F | RESPIRATION RATE: 16 BRPM | DIASTOLIC BLOOD PRESSURE: 77 MMHG

## 2020-05-11 DIAGNOSIS — M54.16 LUMBAR RADICULOPATHY: ICD-10-CM

## 2020-05-11 PROCEDURE — 3E0R33Z INTRODUCTION OF ANTI-INFLAMMATORY INTO SPINAL CANAL, PERCUTANEOUS APPROACH: ICD-10-PCS | Performed by: ANESTHESIOLOGY

## 2020-05-11 RX ORDER — ONDANSETRON 2 MG/ML
4 INJECTION INTRAMUSCULAR; INTRAVENOUS ONCE AS NEEDED
Status: CANCELLED | OUTPATIENT
Start: 2020-05-11 | End: 2020-05-11

## 2020-05-11 RX ORDER — 0.9 % SODIUM CHLORIDE 0.9 %
VIAL (ML) INJECTION AS NEEDED
Status: DISCONTINUED | OUTPATIENT
Start: 2020-05-11 | End: 2020-05-11

## 2020-05-11 RX ORDER — LIDOCAINE HYDROCHLORIDE 10 MG/ML
INJECTION, SOLUTION EPIDURAL; INFILTRATION; INTRACAUDAL; PERINEURAL AS NEEDED
Status: DISCONTINUED | OUTPATIENT
Start: 2020-05-11 | End: 2020-05-11

## 2020-05-11 RX ORDER — DIPHENHYDRAMINE HYDROCHLORIDE 50 MG/ML
50 INJECTION INTRAMUSCULAR; INTRAVENOUS ONCE AS NEEDED
Status: CANCELLED | OUTPATIENT
Start: 2020-05-11 | End: 2020-05-11

## 2020-05-11 RX ORDER — DEXAMETHASONE SODIUM PHOSPHATE 10 MG/ML
INJECTION, SOLUTION INTRAMUSCULAR; INTRAVENOUS AS NEEDED
Status: DISCONTINUED | OUTPATIENT
Start: 2020-05-11 | End: 2020-05-11

## 2020-05-11 NOTE — H&P
History & Physical Examination    Patient Name: Callie Herman  MRN: [id number]  [facility]: [id number]  YOB: 1955    Pre-Operative Diagnosis:  Lumbar radiculopathy [M54.16]    Present Illness: Patient with lumbar radiculopathy for transforam TELE VISIT BECAUSE OF COVID 19   SPOKE WITH PT  AND DAUGHTER .,  PT APPROVED OF ABOVE .  PT AT HOME , MD AT HOME   START TIME : 6:39 PM TO 6:52 PM  SPEND TIME :13 MINUTES ,.     HPI      PT IS A 77 Y/O MALE WITH H/O DM, HTN,  STROKE ,  WITH HEMIPLEGIA ,  COPD,  MI, PVD,  CAD,  CHF,  CKD STAGE 3 , S/P AM[UTATION OF  LEFT FOOT  TOE  FROM OSTEOMYELITIS ,  HIGH CHOL . PROSTATE CA,  S/P CABG ,  PT WITH ITCHING ON LEGS AND THIGH, WHEN SCRATCHES  A LITTLE LUMP WHEN WASH GOES AWAY .  HE  PUT ON COLD TOWEL AND HELPS .,  ALSO ON THE BACK OF THE LEGS .   BOUGHT SOME  DIABETIC  DRY  SKIN CREAM .  CERABE DRY SKIN .  WILL SEE WOUND  MD AND VASCULAR , WOUND IN FOOT HEALING WELL .  ,  NO FEVER, NO CHILLS ,  NO SOB .     REVIEWED MEDS , ALLERGIES, PMHX,     Current Outpatient Medications   Medication Sig Dispense Refill   • ACCU-CHEK DANIEL PLUS test strip TEST TWICE DAILY 200 strip 0   • insulin lispro (HUMALOG) 100 UNIT/ML injectable solution Inject 4 Units into the skin 3 times daily (before meals).     • calcium carbonate-vitamin D (CALTRATE+D) 600-400 MG-UNIT per tablet Take 1 tablet by mouth daily.     • cloNIDine (CATAPRES) 0.1 MG tablet Take 0.1 mg by mouth 2 times daily.     • HYDROcodone-acetaminophen (NORCO)  MG per tablet Take 1 tablet by mouth every 6 hours as needed.     • metoPROLOL tartrate (LOPRESSOR) 25 MG tablet Take 25 mg by mouth every 8 hours.     • lisinopril (ZESTRIL) 10 MG tablet Take 10 mg by mouth daily.     • enoxaparin (LOVENOX) 40 MG/0.4ML injectable solution Inject 40 mg into the skin daily.     • leuprolide, 1 Month, (LUPRON DEPOT) 7.5 MG injection Inject 7.5 mg into the muscle every 6 months.     • Blood Glucose Monitoring Suppl (ACCU-CHEK DANIEL PLUS) w/Device Kit 1 Device 3 times daily. 1 kit 0   • atorvastatin (LIPITOR) 40 MG tablet Take 1 tablet by mouth daily. 90 tablet 1   • clopidogrel (PLAVIX) 75 MG tablet Take 1 tablet by mouth daily. 90 tablet 1   • aspirin 325 MG tablet Take 325 mg by  UROGENITAL [x ] [x ]    EXTREMITIES [x ] [x ]    OTHER        [ x ] I have discussed the risks and benefits and alternatives with the patient/family. They understand and agree to proceed with plan of care.   [ x ] I have reviewed the History and Physical mouth.     • DISPENSE Dispense Glucometer KIT covered by insurnace (Accucheck Re as covered previously) 1 kit 1   • Insulin Syringe 30G X 5/16\" 1 ML Misc USE TWICE DAILY AS DIRECTED 200 each 0   • leuprolide, 6 Month, (LUPRON DEPOT, 6-MONTH,) 45 MG injection Inject every 6 months     • ACCU-CHEK FASTCLIX LANCETS Misc CHECK  BLOOD  SUGAR  TWICE  TO THREE TIMES DAILY     • Apalutamide (ERLEADA) 60 MG Tab 4 tablets daily       No current facility-administered medications for this visit.        ALLERGIES:  No Known Allergies     Family History   Problem Relation Age of Onset   • Cancer Other    • Diabetes Mother    • Cancer Father        Past Surgical History:   Procedure Laterality Date   • Bypass graft othr,fem-pop      10/2017   • Coronary artery bypass graft      2015   • Hernia repair     • Prostate surgery         BS ,134, 181 .     BP: 134/75 TODAY PER PT TAKEN BY NURSE .     PE NOT DONE . .       Problem List Items Addressed This Visit        Nervous    Hemiplegia as late effect of stroke (CMS/HCC) - Primary       Respiratory    COPD (chronic obstructive pulmonary disease) (CMS/HCC)       Circulatory    PVD (peripheral vascular disease) (CMS/HCC)    Hypertension associated with diabetes (CMS/HCC)    Diabetes mellitus type 2 with peripheral artery disease (CMS/HCC)    Chronic diastolic (congestive) heart failure (CMS/HCC)       Urinary    CKD (chronic kidney disease) stage 3, GFR 30-59 ml/min (CMS/HCC)       Musculoskeletal    Status post amputation of lesser toe of left foot (CMS/HCC)       Endocrine    Dyslipidemia associated with type 2 diabetes mellitus (CMS/HCC)       Oncologic    Prostate cancer (CMS/HCC)       Other    S/P CABG (coronary artery bypass graft)          ITCHING : WILL START ON HYDROXYZINE SEE ORDER S.      DM WITH HTN , PAD, HIGH CHOL , CAD, STROKE .  CPM FOR NOW .  LOW CARB DIET, PT TO  FOLLOW THE DIET.      HTN : WITH DM :  CPM . FOR .   BP ; 134/78 THIS AM PER PT .  LOW SALT DIET  .      PAD : S/P TOE AMPUTATION ,WITH OSTEO . CPM SEE PODIATRY . WOUND CARE .       CAD S/P CABG : STABLE ./. CPM      COPD : STABLE CPM  FOR NOW .        PROSTATE CA: ON LUPRON , F/U WITH  UROLOGY . URINATING WELL .    RTC  2 MONTHS F/U .

## 2020-05-11 NOTE — OPERATIVE REPORT
[facility]  Operative Report  2020     Debbie Martin Patient Status:  Hospital Outpatient Surgery    10/22/1955 MRN [id number]   [facility] ENDOSCOPY Attending Mayra Edmonds MD   Hosp Day # 0 PCP Simran Valverde MD     Marcelina saline with 10 mg of dexamethasone was injected without complication. The needle was withdrawn with stylet in situ. The patient tolerated procedure very well. The patient was observed until discharge criteria met.   Discharge instructions were given and p

## 2020-05-13 ENCOUNTER — OFFICE VISIT (OUTPATIENT)
Dept: PHYSICAL THERAPY | Facility: HOSPITAL | Age: 65
End: 2020-05-13
Attending: PEDIATRICS
Payer: COMMERCIAL

## 2020-05-13 PROCEDURE — 97140 MANUAL THERAPY 1/> REGIONS: CPT

## 2020-05-13 PROCEDURE — 97110 THERAPEUTIC EXERCISES: CPT

## 2020-05-13 NOTE — PROGRESS NOTES
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 Philip Diaz x 10 THEREX:  Nustep L4 x 7'  Supine SLR Ecc Only 2 x 10  S/L Quad Stretch 30\" x 3  Clams 2 x 15  Sit to Stand 2 x 10      NMREED:       HEP: walking program, SHANNA    Charges: Manual x 1, TherEx x 2       Total Timed Treatment: 40 min  Total Treatment Time

## 2020-05-18 RX ORDER — GABAPENTIN 300 MG/1
CAPSULE ORAL
Qty: 90 CAPSULE | Refills: 1 | Status: SHIPPED | OUTPATIENT
Start: 2020-05-18 | End: 2020-06-05

## 2020-05-18 NOTE — TELEPHONE ENCOUNTER
Medication:    Disp Refills Start End    \"gabapentin 300 MG Oral Cap 90 capsule 1 3/26/2020     Sig - Route:  Take 1 capsule (300 mg total) by mouth 3 (three) times daily. - Oral    Sent to pharmacy as: Gabapentin 300 MG Oral Capsule (NEURONTIN)\"      Nik

## 2020-05-20 ENCOUNTER — OFFICE VISIT (OUTPATIENT)
Dept: PHYSICAL THERAPY | Facility: HOSPITAL | Age: 65
End: 2020-05-20
Attending: INTERNAL MEDICINE
Payer: COMMERCIAL

## 2020-05-20 PROCEDURE — 97110 THERAPEUTIC EXERCISES: CPT

## 2020-05-20 NOTE — PROGRESS NOTES
Dx: Lumbar spondylosis (M47.816)  Lumbar disc herniation with radiculopathy (M51.16)  Weakness of left lower extremity (R29.898)             Insurance (Authorized # of Visits):  [facility] (20 visit limit, no auth)           Authorizing Physician: Dr. Juliet Bowen SL.  Date: 5/7/2020  TX#: 2/12 Date:  5/13/2020            TX#: 3/12 Date:  5/20/2020              TX#: 4/12 Date:             TX#: 5/ Date:    Tx#: 6/   MANUAL:  Long Axis Traction 1' x4 sustain  Lumbar CPA Mob L3-4 GIII x 5'  STM Lumbar Paraspinals x 5' L

## 2020-05-22 ENCOUNTER — OFFICE VISIT (OUTPATIENT)
Dept: PHYSICAL THERAPY | Facility: HOSPITAL | Age: 65
End: 2020-05-22
Attending: INTERNAL MEDICINE
Payer: COMMERCIAL

## 2020-05-22 PROCEDURE — 97110 THERAPEUTIC EXERCISES: CPT

## 2020-05-22 NOTE — PROGRESS NOTES
Dx: Lumbar spondylosis (M47.816)  Lumbar disc herniation with radiculopathy (M51.16)  Weakness of left lower extremity (R29.898)             Insurance (Authorized # of Visits):  [facility] (20 visit limit, no auth)           Authorizing Physician: Dr. Malissa Bowen 15 L  Supine ABD/ADD in hooklying manual resist 2 x 10 THEREX:  Nustep L4 x 7'  Supine SLR Ecc Only 2 x 10  S/L Quad Stretch 30\" x 3  Clams 2 x 15  Sit to Stand 2 x 10 THEREX:  Nustep L4 x 7'  SLR x 15  Clams x 15  SHANNA x 10  Prone Alt LE x 10  Bridge 10 x

## 2020-05-27 ENCOUNTER — OFFICE VISIT (OUTPATIENT)
Dept: PHYSICAL THERAPY | Facility: HOSPITAL | Age: 65
End: 2020-05-27
Attending: PHYSICIAN ASSISTANT
Payer: COMMERCIAL

## 2020-05-27 ENCOUNTER — APPOINTMENT (OUTPATIENT)
Dept: PHYSICAL THERAPY | Facility: HOSPITAL | Age: 65
End: 2020-05-27
Payer: COMMERCIAL

## 2020-05-27 PROCEDURE — 97110 THERAPEUTIC EXERCISES: CPT

## 2020-05-27 NOTE — PROGRESS NOTES
Dx: Lumbar spondylosis (M47.816)  Lumbar disc herniation with radiculopathy (M51.16)  Weakness of left lower extremity (R29.898)             Insurance (Authorized # of Visits):  [facility] (20 visit limit, no auth)           Authorizing Physician: Dr. Cristóbal Bowen L  Supine ABD/ADD in hooklying manual resist 2 x 10 THEREX:  Nustep L4 x 7'  Supine SLR Ecc Only 2 x 10  S/L Quad Stretch 30\" x 3  Clams 2 x 15  Sit to Stand 2 x 10 THEREX:  Nustep L4 x 7'  SLR x 15  Clams x 15  SHANNA x 10  Prone Alt LE x 10  Bridge 10 x 5\

## 2020-05-28 ENCOUNTER — OFFICE VISIT (OUTPATIENT)
Dept: PHYSICAL THERAPY | Facility: HOSPITAL | Age: 65
End: 2020-05-28
Attending: PHYSICIAN ASSISTANT
Payer: COMMERCIAL

## 2020-05-28 PROBLEM — H25.13 AGE-RELATED NUCLEAR CATARACT OF BOTH EYES: Status: ACTIVE | Noted: 2019-06-11

## 2020-05-28 PROBLEM — R73.03 PREDIABETES: Status: ACTIVE | Noted: 2017-08-29

## 2020-05-28 PROCEDURE — 97110 THERAPEUTIC EXERCISES: CPT

## 2020-05-28 NOTE — PROGRESS NOTES
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 Suzi Bowen Traction 1' x4 sustain MANUAL:   MANUAL:  Long Axis Traction 1' x4 sustain   THEREX:  Nustep L4 x 7'  Supine March x 15 L  Supine ABD/ADD in hooklying manual resist 2 x 10 THEREX:  Nustep L4 x 7'  Supine SLR Ecc Only 2 x 10  S/L Quad Stretch 30\" x 3  Clam

## 2020-06-01 ENCOUNTER — TELEPHONE (OUTPATIENT)
Dept: PHYSICAL THERAPY | Facility: HOSPITAL | Age: 65
End: 2020-06-01

## 2020-06-02 ENCOUNTER — APPOINTMENT (OUTPATIENT)
Dept: PHYSICAL THERAPY | Facility: HOSPITAL | Age: 65
End: 2020-06-02
Attending: PHYSICIAN ASSISTANT
Payer: COMMERCIAL

## 2020-06-04 ENCOUNTER — OFFICE VISIT (OUTPATIENT)
Dept: PHYSICAL THERAPY | Facility: HOSPITAL | Age: 65
End: 2020-06-04
Attending: PHYSICIAN ASSISTANT
Payer: COMMERCIAL

## 2020-06-04 PROCEDURE — 97110 THERAPEUTIC EXERCISES: CPT

## 2020-06-04 NOTE — PROGRESS NOTES
Progress Summary  Pt has attended 8 visits in Physical Therapy.      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  Treatment will include: Gait training, Manual Therapy, Neuromuscular Re-education, Therapeutic Exercise, Home Exercise Program instruction and Modalities to include 45 min  Total Treatment Time: 45 min

## 2020-06-05 RX ORDER — GABAPENTIN 300 MG/1
300 CAPSULE ORAL 3 TIMES DAILY
Qty: 90 CAPSULE | Refills: 1 | Status: SHIPPED | OUTPATIENT
Start: 2020-06-05 | End: 2020-09-21 | Stop reason: ALTCHOICE

## 2020-06-09 ENCOUNTER — OFFICE VISIT (OUTPATIENT)
Dept: PHYSICAL THERAPY | Facility: HOSPITAL | Age: 65
End: 2020-06-09
Attending: PHYSICIAN ASSISTANT
Payer: COMMERCIAL

## 2020-06-09 PROCEDURE — 97110 THERAPEUTIC EXERCISES: CPT

## 2020-06-09 NOTE — PROGRESS NOTES
Dx: Lumbar spondylosis (M47.816)  Lumbar disc herniation with radiculopathy (M51.16)  Weakness of left lower extremity (R29.898)             Insurance (Authorized # of Visits):  [facility] (20 visit limit, no auth)           Authorizing Physician: Dr. Lucia Bowen SB  Bridge 10 x 5\"H  Leg Press L 31# 3 x 15 THEREX:  Reassessment  Nustep L6 x 6'  SLR 2 x 10 1.5#  Abdominal Iso 10 x 5\"H Opp Leg Squeeze  Squat in // Bars 2 x 12  Side Step x 2L RTB  Leg Press L 31# 3 x 15 THEREX:  Nustep L6 x 6'  SLR 2 x 10 1.5#  S/L

## 2020-06-11 ENCOUNTER — OFFICE VISIT (OUTPATIENT)
Dept: PHYSICAL THERAPY | Facility: HOSPITAL | Age: 65
End: 2020-06-11
Attending: PHYSICIAN ASSISTANT
Payer: COMMERCIAL

## 2020-06-11 PROCEDURE — 97110 THERAPEUTIC EXERCISES: CPT

## 2020-06-11 NOTE — PROGRESS NOTES
Dx: Lumbar spondylosis (M47.816)  Lumbar disc herniation with radiculopathy (M51.16)  Weakness of left lower extremity (R29.898)             Insurance (Authorized # of Visits):  [facility] (20 visit limit, no auth)           Authorizing Physician: Dr. Alyse Bowen 10  Abdominal Iso 10 x 5\"H w/ SB  Bridge 10 x 5\"H  Clams 2 x 15 manual resist  Leg Press B 50# 2 x 15, L 25# x 15 THEREX:  Nustep L6 x 6'  SLR 2 x 10  Abdominal Iso 10 x 5\"H w/ SB  Bridge 10 x 5\"H  Leg Press L 31# 3 x 15 THEREX:  Reassessment  Nustep L

## 2020-06-12 ENCOUNTER — OFFICE VISIT (OUTPATIENT)
Dept: FAMILY MEDICINE CLINIC | Facility: CLINIC | Age: 65
End: 2020-06-12
Payer: COMMERCIAL

## 2020-06-12 VITALS
OXYGEN SATURATION: 98 % | RESPIRATION RATE: 18 BRPM | HEART RATE: 70 BPM | SYSTOLIC BLOOD PRESSURE: 144 MMHG | BODY MASS INDEX: 22.85 KG/M2 | HEIGHT: 64 IN | DIASTOLIC BLOOD PRESSURE: 78 MMHG | WEIGHT: 133.81 LBS | TEMPERATURE: 98 F

## 2020-06-12 DIAGNOSIS — Z13.29 SCREENING FOR ENDOCRINE, METABOLIC AND IMMUNITY DISORDER: ICD-10-CM

## 2020-06-12 DIAGNOSIS — I10 ESSENTIAL HYPERTENSION, BENIGN: Primary | ICD-10-CM

## 2020-06-12 DIAGNOSIS — Z13.0 SCREENING FOR ENDOCRINE, METABOLIC AND IMMUNITY DISORDER: ICD-10-CM

## 2020-06-12 DIAGNOSIS — M54.16 LUMBAR RADICULOPATHY: ICD-10-CM

## 2020-06-12 DIAGNOSIS — Z13.228 SCREENING FOR ENDOCRINE, METABOLIC AND IMMUNITY DISORDER: ICD-10-CM

## 2020-06-12 DIAGNOSIS — Z13.0 SCREENING FOR IRON DEFICIENCY ANEMIA: ICD-10-CM

## 2020-06-12 PROCEDURE — 99203 OFFICE O/P NEW LOW 30 MIN: CPT | Performed by: FAMILY MEDICINE

## 2020-06-12 NOTE — PROGRESS NOTES
CHIEF COMPLAINT: Patient presents with:  Hypertension: Follow up   Establish Care: New patient establishing care; not fasting         HPI:     Fabricio Elizondo is a 59year old female presents for HTN to establish care.     Marques Garcia is a 58 yo F with PMH of Past Surgical History:   Procedure Laterality Date   • COLON SURGERY     • TRANSFORAMINAL LUMBAR EPIDURAL STEROID INJECTION SINGLE LEVEL Left 5/11/2020    Performed by Rosanna Vidal MD at [facility] ENDOSCOPY      History reviewed. No pertinent family history. 133 lb 12.8 oz (60.7 kg)   LMP  (LMP Unknown)   SpO2 98%   BMI 22.97 kg/m²   Vital signs reviewed. Appears stated age, well groomed. Physical Exam   Vitals reviewed. Constitutional: She is oriented to person, place, and time.  She appears well-developed a Maintenance:  Annual Physical due on 10/22/1958  Pap Smear,3 Years due on 10/22/1986  Mammogram due on 10/22/1995  FIT Colorectal Screening due on 10/22/2005  Colonoscopy due on 10/22/2005  Influenza Vaccine(Season Ended) due on 09/01/2020  Radha Arrieta

## 2020-06-12 NOTE — PATIENT INSTRUCTIONS
As of October 6th 2014, the Drug Enforcement Agency [facility]) is reclassifying all hydrocodone combination medications from Schedule III to Schedule II. This includes medications such as Norco, Vicodin, Lortab, Zohydro, and Vicoprofen.      What this means for or boring. You just need to know how to make healthier choices. The DASH eating plan has been developed to help you do just that. DASH stands for Dietary Approaches to Stop Hypertension.  It is a plan that has been proven to be healthier for your heart and is:  · 1 ounce cooked meats, poultry, or fish  · 1 egg  Best choices: Lean poultry and fish. Trim away visible fat. Broil, grill, roast, or boil instead of frying. Remove skin from poultry before eating.  Limit how much red meat you eat.  Nuts, seeds, beans

## 2020-06-16 ENCOUNTER — APPOINTMENT (OUTPATIENT)
Dept: PHYSICAL THERAPY | Facility: HOSPITAL | Age: 65
End: 2020-06-16
Attending: PHYSICIAN ASSISTANT
Payer: COMMERCIAL

## 2020-06-20 NOTE — PROGRESS NOTES
Pt notified via [employer]:  Lipids elevated  TSH normal  CMP normal  CBC normal    Will discuss at upcoming appt on 6/26/20

## 2020-06-25 ENCOUNTER — OFFICE VISIT (OUTPATIENT)
Dept: PHYSICAL THERAPY | Facility: HOSPITAL | Age: 65
End: 2020-06-25
Attending: PHYSICIAN ASSISTANT
Payer: COMMERCIAL

## 2020-06-25 PROCEDURE — 97110 THERAPEUTIC EXERCISES: CPT

## 2020-06-25 NOTE — PROGRESS NOTES
Dx: Lumbar spondylosis (M47.816)  Lumbar disc herniation with radiculopathy (M51.16)  Weakness of left lower extremity (R29.898)             Insurance (Authorized # of Visits):  [facility] (20 visit limit, no auth)           Authorizing Physician: Dr. Angel Bowen Flex  [place]ley Side Step 20# x 8 ea THEREX:  Nustep L6 x 6' LE only  SLR 2 x 10 x 3\"H 2#  S/L Hip ABD 2 x 15 manual resist  Reverse Lunge x 10 ea  Lat Lunge onto Airex x 10  Step Up 8\" on Airex w/ hip flexion x 10 THEREX:  Nustep L6 x 6' LE only  SLR 2 x 1

## 2020-06-26 ENCOUNTER — OFFICE VISIT (OUTPATIENT)
Dept: FAMILY MEDICINE CLINIC | Facility: CLINIC | Age: 65
End: 2020-06-26
Payer: COMMERCIAL

## 2020-06-26 VITALS
HEART RATE: 63 BPM | TEMPERATURE: 99 F | BODY MASS INDEX: 22.5 KG/M2 | DIASTOLIC BLOOD PRESSURE: 80 MMHG | HEIGHT: 64 IN | WEIGHT: 131.81 LBS | SYSTOLIC BLOOD PRESSURE: 142 MMHG | RESPIRATION RATE: 18 BRPM | OXYGEN SATURATION: 98 %

## 2020-06-26 DIAGNOSIS — Z12.4 SCREENING FOR CERVICAL CANCER: ICD-10-CM

## 2020-06-26 DIAGNOSIS — I10 ESSENTIAL HYPERTENSION, BENIGN: ICD-10-CM

## 2020-06-26 DIAGNOSIS — Z00.00 ANNUAL PHYSICAL EXAM: Primary | ICD-10-CM

## 2020-06-26 DIAGNOSIS — Z12.39 SCREENING FOR MALIGNANT NEOPLASM OF BREAST: ICD-10-CM

## 2020-06-26 DIAGNOSIS — Z23 NEED FOR VACCINATION: ICD-10-CM

## 2020-06-26 DIAGNOSIS — E78.2 MIXED HYPERLIPIDEMIA: ICD-10-CM

## 2020-06-26 DIAGNOSIS — Z12.11 SCREEN FOR COLON CANCER: ICD-10-CM

## 2020-06-26 PROCEDURE — 99396 PREV VISIT EST AGE 40-64: CPT | Performed by: FAMILY MEDICINE

## 2020-06-26 PROCEDURE — 90471 IMMUNIZATION ADMIN: CPT | Performed by: FAMILY MEDICINE

## 2020-06-26 PROCEDURE — 87624 HPV HI-RISK TYP POOLED RSLT: CPT | Performed by: FAMILY MEDICINE

## 2020-06-26 PROCEDURE — 90715 TDAP VACCINE 7 YRS/> IM: CPT | Performed by: FAMILY MEDICINE

## 2020-06-26 PROCEDURE — 88175 CYTOPATH C/V AUTO FLUID REDO: CPT | Performed by: FAMILY MEDICINE

## 2020-06-26 RX ORDER — METOPROLOL SUCCINATE 50 MG/1
50 TABLET, EXTENDED RELEASE ORAL DAILY
Qty: 90 TABLET | Refills: 1 | Status: SHIPPED | OUTPATIENT
Start: 2020-06-26 | End: 2021-03-18

## 2020-06-26 NOTE — PROGRESS NOTES
Patient presents with:  Physical: Annual physical exam      HPI:   Abhishek Fair is a 59year old female who presents for a complete physical with gyne exam.   Patient feels well, dental visit up to date, no hearing problem.   Vaccinations up to juan josé at the nursing home, she tries to find time to walk outside around the facility. She states she had a TIA about 10 yrs ago- had blurry vision while driving, lasted about 5 minutes then resolved. After workup and eval, she was told it was likely a TIA. Constitutional: Negative for fatigue and fever. Eyes: Negative for visual disturbance. Respiratory: Negative for chest tightness and shortness of breath. Cardiovascular: Negative for chest pain and leg swelling.    Gastrointestinal: Negative for na post-menopausal.    - THINPREP PAP SMEAR B; Future  - HPV HIGH RISK , THIN PREP COLLECTION; Future  - [facility] LAURIE 2D+3D SCREENING BILAT (CPT=77067/16002);  Future  - TETANUS, DIPHTHERIA TOXOIDS AND ACELLULAR PERTUSIS VACCINE (TDAP), >7 YEARS, IM USE  - GASTRO three times weekly. Health maintenance. Osteoporosis prevention addressed  Recommended whole food type diet, eliminate processed food/low sugar and low sat fat diet      The patient indicates understanding of these issues and agrees to the plan.     Ret

## 2020-06-29 ENCOUNTER — PATIENT MESSAGE (OUTPATIENT)
Dept: FAMILY MEDICINE CLINIC | Facility: CLINIC | Age: 65
End: 2020-06-29

## 2020-06-29 DIAGNOSIS — H91.90 PERCEIVED HEARING CHANGES: Primary | ICD-10-CM

## 2020-06-29 DIAGNOSIS — H93.11 TINNITUS OF RIGHT EAR: ICD-10-CM

## 2020-06-29 NOTE — TELEPHONE ENCOUNTER
Patient seen in office 6/26 for annual, does not appear ear was discussed. Per note:   \"HEENT: PERRLA and EOMI. OP moist no lesions. TM WNL, monet. Normal ears canals bilaterally. Neck is supple, with no cervical LAD or thyroid abnormalities. \"    Please

## 2020-06-29 NOTE — TELEPHONE ENCOUNTER
From: Fabricio Elizondo  To: Erin Navarrete MD  Sent: 6/29/2020 11:43 AM CDT  Subject: Referral Request    Thank you very much

## 2020-06-29 NOTE — TELEPHONE ENCOUNTER
From: Filipe Mi  To: Jennifer Martinez MD  Sent: 6/29/2020 4:20 AM CDT  Subject: Non-Urgent Medical Question    Dr Aurelio Lozada.    I was wondering if there was anyway I could get a ENT referral. My right ear is really bothering me.  Constant ringing

## 2020-06-30 ENCOUNTER — OFFICE VISIT (OUTPATIENT)
Dept: PHYSICAL THERAPY | Facility: HOSPITAL | Age: 65
End: 2020-06-30
Attending: PHYSICIAN ASSISTANT
Payer: COMMERCIAL

## 2020-06-30 PROCEDURE — 97110 THERAPEUTIC EXERCISES: CPT

## 2020-06-30 NOTE — PROGRESS NOTES
Pt notified via [employer]:  Pap is normal. HPV neg. She is 59 yrs old. No pap smear indicated after age 72.

## 2020-06-30 NOTE — PROGRESS NOTES
Discharge Summary  Pt has attended 12 visits in Physical Therapy.      Dx: Lumbar spondylosis (M47.816)  Lumbar disc herniation with radiculopathy (M51.16)  Weakness of left lower extremity (R29.898)             Insurance (Authorized # of Visits):  [facility] (20 this course of care. Thank you for your referral. If you have any questions, please contact me at Dept: [phone].     Sincerely,  Electronically signed by therapist: Eliel López PT     [de-identified] certification required:  No     Date: 5/28/2020  T

## 2020-07-05 ENCOUNTER — PATIENT MESSAGE (OUTPATIENT)
Dept: FAMILY MEDICINE CLINIC | Facility: CLINIC | Age: 65
End: 2020-07-05

## 2020-07-06 ENCOUNTER — PATIENT MESSAGE (OUTPATIENT)
Dept: FAMILY MEDICINE CLINIC | Facility: CLINIC | Age: 65
End: 2020-07-06

## 2020-07-06 NOTE — TELEPHONE ENCOUNTER
From: Filipe Mi  To: Jennifer Martinez MD  Sent: 7/6/2020 9:03 AM CDT  Subject: Other    Hi Dr Aurelio Lozada.    I was just informed that I need a current TB test. Do I have to have a referral or make an appt to come into your office     Have a wonde

## 2020-07-06 NOTE — TELEPHONE ENCOUNTER
From: Anitha Yanez  To: Florencio Cooper MD  Sent: 7/5/2020 12:35 PM CDT  Subject: Other    I have a question about THINPREP PAP SMEAR ONLY resulted on 6/30/20 at 10:54 A    No question. ..thank you and have a wonderful summer.  Stay safe

## 2020-07-07 PROCEDURE — 86580 TB INTRADERMAL TEST: CPT | Performed by: FAMILY MEDICINE

## 2020-07-13 ENCOUNTER — NURSE ONLY (OUTPATIENT)
Dept: FAMILY MEDICINE CLINIC | Facility: CLINIC | Age: 65
End: 2020-07-13
Payer: COMMERCIAL

## 2020-07-13 LAB — INDURATION (): 0 MM (ref 0–11)

## 2020-07-13 NOTE — PROGRESS NOTES
PPD administered to right forearm without incident. Provided patient with details about TB read, must return within 48-72 hours, or results invalid. Patient dropped off form for work. Will complete for patient pick-up.

## 2020-07-15 ENCOUNTER — NURSE ONLY (OUTPATIENT)
Dept: FAMILY MEDICINE CLINIC | Facility: CLINIC | Age: 65
End: 2020-07-15
Payer: COMMERCIAL

## 2020-07-15 ENCOUNTER — TELEPHONE (OUTPATIENT)
Dept: FAMILY MEDICINE CLINIC | Facility: CLINIC | Age: 65
End: 2020-07-15

## 2020-08-15 ENCOUNTER — PATIENT MESSAGE (OUTPATIENT)
Dept: FAMILY MEDICINE CLINIC | Facility: CLINIC | Age: 65
End: 2020-08-15

## 2020-08-17 NOTE — TELEPHONE ENCOUNTER
From: Denys Cardona  To: Jocelynn Shirley MD  Sent: 8/15/2020 1:21 AM CDT  Subject: Other    Hi Dr Blanquita Rivas    I have test I have not been able to currently schedule because I have not been able to make payments on bill that has occur during the pa

## 2020-08-19 ENCOUNTER — PATIENT MESSAGE (OUTPATIENT)
Dept: FAMILY MEDICINE CLINIC | Facility: CLINIC | Age: 65
End: 2020-08-19

## 2020-08-19 NOTE — TELEPHONE ENCOUNTER
Per patient, bowel movements have returned to normal, declines appt at this time. States she will call if any changes.      Reji Kerr MD

## 2020-08-19 NOTE — TELEPHONE ENCOUNTER
Please call pt to schedule at appt with me, in office, since she is requesting C. Diff testing and will need to come anyway to  the collection materials. Thanks.

## 2020-08-19 NOTE — TELEPHONE ENCOUNTER
From: Abhishek Fair  To: Ernestine Pierce MD  Sent: 8/19/2020 1:39 PM CDT  Subject: Other    Dr Cas Del Cid. That problem with my bowels had last for about 1 1/2 weeks. Was not looking good at all. Going 4-5 times a day. Irregular stools. .dark in colo

## 2020-08-19 NOTE — TELEPHONE ENCOUNTER
LMOM to return call to the office.  Provided pt office phone [phone] along with office hours given.]

## 2020-09-13 ENCOUNTER — HOSPITAL ENCOUNTER (EMERGENCY)
Facility: HOSPITAL | Age: 65
Discharge: HOME | End: 2020-09-13
Attending: EMERGENCY MEDICINE
Payer: COMMERCIAL

## 2020-09-13 ENCOUNTER — APPOINTMENT (OUTPATIENT)
Dept: GENERAL RADIOLOGY | Facility: HOSPITAL | Age: 65
End: 2020-09-13
Attending: EMERGENCY MEDICINE
Payer: COMMERCIAL

## 2020-09-13 ENCOUNTER — APPOINTMENT (OUTPATIENT)
Dept: CT IMAGING | Facility: HOSPITAL | Age: 65
End: 2020-09-13
Attending: EMERGENCY MEDICINE
Payer: COMMERCIAL

## 2020-09-13 VITALS
BODY MASS INDEX: 23 KG/M2 | SYSTOLIC BLOOD PRESSURE: 161 MMHG | HEART RATE: 79 BPM | WEIGHT: 136 LBS | DIASTOLIC BLOOD PRESSURE: 89 MMHG | TEMPERATURE: 98 F | OXYGEN SATURATION: 97 % | RESPIRATION RATE: 19 BRPM

## 2020-09-13 DIAGNOSIS — R07.89 CHEST PAIN, ATYPICAL: Primary | ICD-10-CM

## 2020-09-13 DIAGNOSIS — R91.8 PULMONARY NODULES: ICD-10-CM

## 2020-09-13 LAB
ALBUMIN SERPL-MCNC: 3.7 G/DL (ref 3.4–5)
ALBUMIN/GLOB SERPL: 0.9 {RATIO} (ref 1–2)
ALP LIVER SERPL-CCNC: 91 U/L (ref 50–130)
ALT SERPL-CCNC: 25 U/L (ref 13–56)
ANION GAP SERPL CALC-SCNC: 4 MMOL/L (ref 0–18)
AST SERPL-CCNC: 27 U/L (ref 15–37)
BASOPHILS # BLD AUTO: 0.04 X10(3) UL (ref 0–0.2)
BASOPHILS NFR BLD AUTO: 0.6 %
BILIRUB SERPL-MCNC: 0.4 MG/DL (ref 0.1–2)
BUN BLD-MCNC: 15 MG/DL (ref 7–18)
BUN/CREAT SERPL: 14.3 (ref 10–20)
CALCIUM BLD-MCNC: 10.1 MG/DL (ref 8.5–10.1)
CHLORIDE SERPL-SCNC: 109 MMOL/L (ref 98–112)
CO2 SERPL-SCNC: 26 MMOL/L (ref 21–32)
CREAT BLD-MCNC: 1.05 MG/DL (ref 0.55–1.02)
DEPRECATED RDW RBC AUTO: 47.1 FL (ref 35.1–46.3)
EOSINOPHIL # BLD AUTO: 0.4 X10(3) UL (ref 0–0.7)
EOSINOPHIL NFR BLD AUTO: 6.3 %
ERYTHROCYTE [DISTWIDTH] IN BLOOD BY AUTOMATED COUNT: 14.3 % (ref 11–15)
GLOBULIN PLAS-MCNC: 4.1 G/DL (ref 2.8–4.4)
GLUCOSE BLD-MCNC: 107 MG/DL (ref 70–99)
HCT VFR BLD AUTO: 37.9 % (ref 35–48)
HGB BLD-MCNC: 11.9 G/DL (ref 12–16)
IMM GRANULOCYTES # BLD AUTO: 0.01 X10(3) UL (ref 0–1)
IMM GRANULOCYTES NFR BLD: 0.2 %
LYMPHOCYTES # BLD AUTO: 2.53 X10(3) UL (ref 1–4)
LYMPHOCYTES NFR BLD AUTO: 40.2 %
M PROTEIN MFR SERPL ELPH: 7.8 G/DL (ref 6.4–8.2)
MCH RBC QN AUTO: 28.3 PG (ref 26–34)
MCHC RBC AUTO-ENTMCNC: 31.4 G/DL (ref 31–37)
MCV RBC AUTO: 90 FL (ref 80–100)
MONOCYTES # BLD AUTO: 0.76 X10(3) UL (ref 0.1–1)
MONOCYTES NFR BLD AUTO: 12.1 %
NEUTROPHILS # BLD AUTO: 2.56 X10 (3) UL (ref 1.5–7.7)
NEUTROPHILS # BLD AUTO: 2.56 X10(3) UL (ref 1.5–7.7)
NEUTROPHILS NFR BLD AUTO: 40.6 %
OSMOLALITY SERPL CALC.SUM OF ELEC: 289 MOSM/KG (ref 275–295)
PLATELET # BLD AUTO: 216 10(3)UL (ref 150–450)
POTASSIUM SERPL-SCNC: 3.8 MMOL/L (ref 3.5–5.1)
RBC # BLD AUTO: 4.21 X10(6)UL (ref 3.8–5.3)
SODIUM SERPL-SCNC: 139 MMOL/L (ref 136–145)
TROPONIN I SERPL-MCNC: <0.045 NG/ML (ref ?–0.04)
TROPONIN I SERPL-MCNC: <0.045 NG/ML (ref ?–0.04)
WBC # BLD AUTO: 6.3 X10(3) UL (ref 4–11)

## 2020-09-13 PROCEDURE — 85025 COMPLETE CBC W/AUTO DIFF WBC: CPT | Performed by: EMERGENCY MEDICINE

## 2020-09-13 PROCEDURE — 96374 THER/PROPH/DIAG INJ IV PUSH: CPT

## 2020-09-13 PROCEDURE — 80053 COMPREHEN METABOLIC PANEL: CPT | Performed by: EMERGENCY MEDICINE

## 2020-09-13 PROCEDURE — 84484 ASSAY OF TROPONIN QUANT: CPT | Performed by: EMERGENCY MEDICINE

## 2020-09-13 PROCEDURE — 71275 CT ANGIOGRAPHY CHEST: CPT | Performed by: EMERGENCY MEDICINE

## 2020-09-13 PROCEDURE — 99285 EMERGENCY DEPT VISIT HI MDM: CPT

## 2020-09-13 PROCEDURE — 71045 X-RAY EXAM CHEST 1 VIEW: CPT | Performed by: EMERGENCY MEDICINE

## 2020-09-13 PROCEDURE — 93010 ELECTROCARDIOGRAM REPORT: CPT

## 2020-09-13 PROCEDURE — 93005 ELECTROCARDIOGRAM TRACING: CPT

## 2020-09-13 RX ORDER — LABETALOL HYDROCHLORIDE 5 MG/ML
10 INJECTION, SOLUTION INTRAVENOUS ONCE
Status: COMPLETED | OUTPATIENT
Start: 2020-09-13 | End: 2020-09-13

## 2020-09-13 RX ORDER — ASPIRIN 81 MG/1
324 TABLET, CHEWABLE ORAL ONCE
Status: COMPLETED | OUTPATIENT
Start: 2020-09-13 | End: 2020-09-13

## 2020-09-13 RX ORDER — LIDOCAINE HYDROCHLORIDE 20 MG/ML
10 SOLUTION OROPHARYNGEAL ONCE
Status: COMPLETED | OUTPATIENT
Start: 2020-09-13 | End: 2020-09-13

## 2020-09-13 RX ORDER — MAGNESIUM HYDROXIDE/ALUMINUM HYDROXICE/SIMETHICONE 120; 1200; 1200 MG/30ML; MG/30ML; MG/30ML
30 SUSPENSION ORAL ONCE
Status: COMPLETED | OUTPATIENT
Start: 2020-09-13 | End: 2020-09-13

## 2020-09-13 NOTE — ED PROVIDER NOTES
Patient Seen in: [facility] Emergency Department      History   Patient presents with:  Chest Pain Angina    Stated Complaint: Chest pain    HPI    66-year-old female presents for evaluation of chest pain.   Patient states she developed chest pain wh motions intact. Neck: Supple  Lungs: Clear to auscultation bilaterally. Heart: Regular rate and rhythm. Abdomen: Soft, nontender. Skin: No rash. No edema. Neurologic: No focal neurologic deficits.   Normal speech pattern  Musculoskeletal: No tendern ([address]) which includes the Dose Index Registry.   PATIENT STATED HISTORY:(As transcribed by Technologist)  Patient presents with upper chest pain   CONTRAST USED:  100cc of Omnipaque 350  FINDINGS:  VASCULATURE:  No pulmonary embo PM     Finalized by (CST): oRsemarie Reilly MD on 9/13/2020 at 4:43 PM       Xr Chest Ap Portable  (cpt=71045)    Result Date: 9/13/2020  PROCEDURE:  XR CHEST AP PORTABLE  (CPT=71045)  TECHNIQUE:  AP chest radiograph was obtained. COMPARISON:  None.   IN

## 2020-09-13 NOTE — ED NOTES
Patient has been there for 24 hours and is a tightness that is constant. Has taken ibuprofen with no relief.

## 2020-09-13 NOTE — ED INITIAL ASSESSMENT (HPI)
Per patient, chest pain in the upper center of the chest near the throat. Over time, she noticed pain with swallowed develop. Mild shortness of breath. AAO x 4.

## 2020-09-14 ENCOUNTER — PATIENT OUTREACH (OUTPATIENT)
Dept: FAMILY MEDICINE CLINIC | Facility: CLINIC | Age: 65
End: 2020-09-14

## 2020-09-14 LAB
ATRIAL RATE: 64 BPM
P AXIS: 30 DEGREES
P-R INTERVAL: 150 MS
Q-T INTERVAL: 400 MS
QRS DURATION: 74 MS
QTC CALCULATION (BEZET): 412 MS
R AXIS: 68 DEGREES
T AXIS: -10 DEGREES
VENTRICULAR RATE: 64 BPM

## 2020-09-14 NOTE — PROGRESS NOTES
Left message for patient to call the office to schedule an follow up from ER for Pulmonary nodules. Refill is too soon.     Pharmacy     [facility] [address], [phone], [phone]   Outpatient Medication Detail      Disp Refills Start End    Diclofenac Sodium 1 %

## 2020-09-21 ENCOUNTER — OFFICE VISIT (OUTPATIENT)
Dept: FAMILY MEDICINE CLINIC | Facility: CLINIC | Age: 65
End: 2020-09-21
Payer: COMMERCIAL

## 2020-09-21 VITALS
RESPIRATION RATE: 18 BRPM | BODY MASS INDEX: 23.15 KG/M2 | HEART RATE: 70 BPM | SYSTOLIC BLOOD PRESSURE: 162 MMHG | WEIGHT: 135.63 LBS | HEIGHT: 64 IN | DIASTOLIC BLOOD PRESSURE: 92 MMHG | TEMPERATURE: 98 F

## 2020-09-21 DIAGNOSIS — I10 ESSENTIAL HYPERTENSION, BENIGN: Primary | ICD-10-CM

## 2020-09-21 DIAGNOSIS — R91.1 PULMONARY NODULE, LEFT: ICD-10-CM

## 2020-09-21 PROCEDURE — 1111F DSCHRG MED/CURRENT MED MERGE: CPT | Performed by: FAMILY MEDICINE

## 2020-09-21 PROCEDURE — 99214 OFFICE O/P EST MOD 30 MIN: CPT | Performed by: FAMILY MEDICINE

## 2020-09-21 PROCEDURE — 3077F SYST BP >= 140 MM HG: CPT | Performed by: FAMILY MEDICINE

## 2020-09-21 PROCEDURE — 3008F BODY MASS INDEX DOCD: CPT | Performed by: FAMILY MEDICINE

## 2020-09-21 PROCEDURE — 3080F DIAST BP >= 90 MM HG: CPT | Performed by: FAMILY MEDICINE

## 2020-09-21 RX ORDER — LISINOPRIL 10 MG/1
10 TABLET ORAL DAILY
Qty: 30 TABLET | Refills: 1 | Status: SHIPPED | OUTPATIENT
Start: 2020-09-21 | End: 2020-09-30

## 2020-09-21 NOTE — PATIENT INSTRUCTIONS
Low-Salt Diet  This diet removes foods that are high in salt. It also limits the amount of salt you use when cooking. It is most often used for people with high blood pressure, fluid retention (edema), and kidney, liver, or heart disease.    Table salt ha OK: Tea, coffee, fizzy (carbonated) drinks, juices   Avoid: Flavored coffees, electrolyte replacement drinks, sports drinks   Meats  OK:  All fresh meat, fish, poultry, low-salt tuna, eggs, egg substitute   Avoid: Smoked, pickled, brine-cured, or salted vladimir

## 2020-09-21 NOTE — PROGRESS NOTES
CHIEF COMPLAINT: Patient presents with:  Hospital F/U: ER for chest pain        HPI:     Gustavo Rodriguez is a 59year old female presents for ED follow up.     ED follow-up: Pt was recently at the ED 9/13/20 for CP and noted to have /105 on arri (TAB-A-KALPANA) Oral Tab Take 1 tablet by mouth daily. • Olive Leaf Extract 500 MG Oral Cap Take 500 mg by mouth daily. • ibuprofen 800 MG Oral Tab Take 800 mg by mouth every 8 (eight) hours as needed for Pain.            Allergies:    Influenza Vacc Admission on 09/13/2020, Discharged on 09/13/2020   Component Date Value   • Hold Blue 09/13/2020 Auto Resulted    • Hold Lavender 09/13/2020 Auto Resulted    • Hold Geradine Kawasaki 09/13/2020 Auto Resulted    • Hold Gold 09/13/2020 Auto Resulted    • Ventric Immature Granulocyte % 09/13/2020 0.2    • Troponin 09/13/2020 <0.045       REVIEWED THIS VISIT  ASSESSMENT/PLAN:   59year old female with    1.  Essential hypertension, benign  - BP elevated today, still elevated at 156/90 on recheck  - June labs reviewed Leighton Calhoun MD      Patient understands plan and follow-up. Return in about 1 week (around 9/28/2020) for BP recheck in 7-10 days.

## 2020-09-30 ENCOUNTER — OFFICE VISIT (OUTPATIENT)
Dept: FAMILY MEDICINE CLINIC | Facility: CLINIC | Age: 65
End: 2020-09-30
Payer: COMMERCIAL

## 2020-09-30 ENCOUNTER — TELEPHONE (OUTPATIENT)
Dept: FAMILY MEDICINE CLINIC | Facility: CLINIC | Age: 65
End: 2020-09-30

## 2020-09-30 VITALS
HEIGHT: 64 IN | HEART RATE: 68 BPM | DIASTOLIC BLOOD PRESSURE: 90 MMHG | SYSTOLIC BLOOD PRESSURE: 142 MMHG | TEMPERATURE: 98 F | OXYGEN SATURATION: 99 % | WEIGHT: 133.19 LBS | BODY MASS INDEX: 22.74 KG/M2 | RESPIRATION RATE: 18 BRPM

## 2020-09-30 DIAGNOSIS — I10 ESSENTIAL HYPERTENSION, BENIGN: Primary | ICD-10-CM

## 2020-09-30 PROCEDURE — 99214 OFFICE O/P EST MOD 30 MIN: CPT | Performed by: FAMILY MEDICINE

## 2020-09-30 PROCEDURE — 3008F BODY MASS INDEX DOCD: CPT | Performed by: FAMILY MEDICINE

## 2020-09-30 PROCEDURE — 3077F SYST BP >= 140 MM HG: CPT | Performed by: FAMILY MEDICINE

## 2020-09-30 PROCEDURE — 3080F DIAST BP >= 90 MM HG: CPT | Performed by: FAMILY MEDICINE

## 2020-09-30 RX ORDER — LISINOPRIL 20 MG/1
20 TABLET ORAL DAILY
Qty: 30 TABLET | Refills: 1 | Status: SHIPPED | OUTPATIENT
Start: 2020-09-30 | End: 2020-12-08

## 2020-09-30 NOTE — PROGRESS NOTES
CHIEF COMPLAINT: Patient presents with:  HTN: BP recheck        HPI:     Sera Hensley is a 59year old female presents for BP recheck.     HTN f-u: Pt was started on Lisinopril 10 mg last week, in addition to the Metoprolol that she is already [place] Dispense Refill   • lisinopril 20 MG Oral Tab Take 1 tablet (20 mg total) by mouth daily. 30 tablet 1   • Metoprolol Succinate ER 50 MG Oral Tablet 24 Hr Take 1 tablet (50 mg total) by mouth daily.  90 tablet 1   • Multiple Vitamin (TAB-A-KALPANA) Oral Tab Jerrod Ann Neurological: She is alert and oriented to person, place, and time. Skin: Skin is warm and dry. Psychiatric: She has a normal mood and affect.  Her behavior is normal.       LABS     Admission on 09/13/2020, Discharged on 09/13/2020   Component Date V 09/13/2020 0.01    • Neutrophil % 09/13/2020 40.6    • Lymphocyte % 09/13/2020 40.2    • Monocyte % 09/13/2020 12.1    • Eosinophil % 09/13/2020 6.3    • Basophil % 09/13/2020 0.6    • Immature Granulocyte % 09/13/2020 0.2    • Troponin 09/13/2020 <0.045 hypertension, benign     Family history of early CAD     Herpes genitalis     Parotid mass     Prediabetes     Mixed hyperlipidemia     TIA (transient ischemic attack)      Imaging & Referrals:  None     9/30/2020  Venkat England MD      Patient u

## 2020-10-14 ENCOUNTER — OFFICE VISIT (OUTPATIENT)
Dept: FAMILY MEDICINE CLINIC | Facility: CLINIC | Age: 65
End: 2020-10-14
Payer: COMMERCIAL

## 2020-10-14 VITALS
TEMPERATURE: 98 F | OXYGEN SATURATION: 98 % | BODY MASS INDEX: 23.05 KG/M2 | HEIGHT: 64 IN | SYSTOLIC BLOOD PRESSURE: 132 MMHG | HEART RATE: 74 BPM | DIASTOLIC BLOOD PRESSURE: 80 MMHG | WEIGHT: 135 LBS | RESPIRATION RATE: 18 BRPM

## 2020-10-14 DIAGNOSIS — Z28.21 INFLUENZA VACCINATION DECLINED BY PATIENT: ICD-10-CM

## 2020-10-14 DIAGNOSIS — M25.50 PAIN IN JOINT, MULTIPLE SITES: ICD-10-CM

## 2020-10-14 DIAGNOSIS — I10 ESSENTIAL HYPERTENSION, BENIGN: Primary | ICD-10-CM

## 2020-10-14 PROCEDURE — 3079F DIAST BP 80-89 MM HG: CPT | Performed by: FAMILY MEDICINE

## 2020-10-14 PROCEDURE — 99213 OFFICE O/P EST LOW 20 MIN: CPT | Performed by: FAMILY MEDICINE

## 2020-10-14 PROCEDURE — 3075F SYST BP GE 130 - 139MM HG: CPT | Performed by: FAMILY MEDICINE

## 2020-10-14 PROCEDURE — 3008F BODY MASS INDEX DOCD: CPT | Performed by: FAMILY MEDICINE

## 2020-10-14 RX ORDER — IBUPROFEN 800 MG/1
800 TABLET ORAL EVERY 8 HOURS PRN
Qty: 90 TABLET | Refills: 1 | Status: SHIPPED | OUTPATIENT
Start: 2020-10-14 | End: 2021-04-05

## 2020-10-14 NOTE — PROGRESS NOTES
CHIEF COMPLAINT: Patient presents with:  HTN        HPI:     Krista Braswell is a 59year old female presents for BP recheck. HTN f-u: Pt states she is feeling well after the increase to Lisinopril 20 mg 2 weeks ago.  She has no HA, no vision luevano 500 mg by mouth daily. • ibuprofen 800 MG Oral Tab Take 800 mg by mouth every 8 (eight) hours as needed for Pain.            Allergies:    Influenza Vaccine L*    RASH    Comment:Rash and fatigue after first flu vaccine  Penicillins             RASH, Atrial rate 09/13/2020 64    • P-R Interval 09/13/2020 150    • QRS Duration 09/13/2020 74    • Q-T Interval 09/13/2020 400    • QTC Calculation (Bezet) 09/13/2020 412    • P Axis 09/13/2020 30    • R Axis 09/13/2020 68    • T Axis 09/13/2020 -10    • Gluc cont Metoprolol ER 50mg daily  - d/w pt a healthy, low-sodium diet, regular exercise, and wt loss  - f-u in 2 months for f-u (please complete fasting labs at [address] prior to visit)    2.  Joint pains  - pt has joint pains that she occasionally takes Ibuprofe

## 2020-10-19 DIAGNOSIS — I10 ESSENTIAL HYPERTENSION, BENIGN: ICD-10-CM

## 2020-10-19 RX ORDER — LISINOPRIL 10 MG/1
TABLET ORAL
Qty: 30 TABLET | Refills: 1 | OUTPATIENT
Start: 2020-10-19

## 2020-10-19 NOTE — TELEPHONE ENCOUNTER
Pt requesting refill of   Requested Prescriptions     Pending Prescriptions Disp Refills   • LISINOPRIL 10 MG Oral Tab [Pharmacy Med Name: LISINOPRIL 10 MG TABLET] 30 tablet 1     Sig: TAKE 1 TABLET BY MOUTH EVERY DAY       Passed protocol.  Request vandana

## 2020-10-30 DIAGNOSIS — I10 ESSENTIAL HYPERTENSION, BENIGN: ICD-10-CM

## 2020-10-30 RX ORDER — LISINOPRIL 20 MG/1
TABLET ORAL
Qty: 30 TABLET | Refills: 1 | OUTPATIENT
Start: 2020-10-30

## 2020-10-30 NOTE — TELEPHONE ENCOUNTER
Pt requesting refill of   Requested Prescriptions             Pending Prescriptions Disp Refills   • LISINOPRIL 10 MG Oral Tab [Pharmacy Med Name: LISINOPRIL 10 MG TABLET] 30 tablet 1       Sig: TAKE 1 TABLET BY MOUTH EVERY DAY         Passed protocol.  Req

## 2020-12-08 DIAGNOSIS — I10 ESSENTIAL HYPERTENSION, BENIGN: ICD-10-CM

## 2020-12-08 RX ORDER — LISINOPRIL 20 MG/1
TABLET ORAL
Qty: 90 TABLET | Refills: 0 | Status: SHIPPED | OUTPATIENT
Start: 2020-12-08 | End: 2021-04-05 | Stop reason: ALTCHOICE

## 2020-12-08 NOTE — TELEPHONE ENCOUNTER
Pt requesting refill of   Requested Prescriptions     Pending Prescriptions Disp Refills   • LISINOPRIL 20 MG Oral Tab [Pharmacy Med Name: LISINOPRIL 20 MG TABLET] 30 tablet 1     Sig: TAKE 1 TABLET BY MOUTH EVERY DAY       Passed protocol, refill approv

## 2021-03-12 ENCOUNTER — PATIENT OUTREACH (OUTPATIENT)
Dept: FAMILY MEDICINE CLINIC | Facility: CLINIC | Age: 66
End: 2021-03-12

## 2021-03-12 DIAGNOSIS — Z23 NEED FOR VACCINATION: ICD-10-CM

## 2021-03-17 ENCOUNTER — TELEPHONE (OUTPATIENT)
Dept: FAMILY MEDICINE CLINIC | Facility: CLINIC | Age: 66
End: 2021-03-17

## 2021-03-17 DIAGNOSIS — Z12.31 ENCOUNTER FOR SCREENING MAMMOGRAM FOR MALIGNANT NEOPLASM OF BREAST: Primary | ICD-10-CM

## 2021-03-17 DIAGNOSIS — I10 ESSENTIAL HYPERTENSION, BENIGN: ICD-10-CM

## 2021-03-17 NOTE — TELEPHONE ENCOUNTER
Patient is scheduled for mammogram and labs. She states she has had a colonoscopy in the last 4 or 5 years with [address] [place] [address]. Last colonoscopy in care everywhere was 0057 but she is certain done since then.    She will sign release for records at next OV 4/5/

## 2021-03-17 NOTE — TELEPHONE ENCOUNTER
Received call from central schedule re: mammogram order. Patient has changed ins to medicare. Need to change diagnosis code to Z12.31. (Currently is z12.39). Order pended with corrected diagnosis.    Scheduled 3/22/21

## 2021-03-18 RX ORDER — METOPROLOL SUCCINATE 50 MG/1
TABLET, EXTENDED RELEASE ORAL
Qty: 90 TABLET | Refills: 0 | Status: SHIPPED | OUTPATIENT
Start: 2021-03-18 | End: 2021-04-05

## 2021-03-22 ENCOUNTER — HOSPITAL ENCOUNTER (OUTPATIENT)
Dept: MAMMOGRAPHY | Facility: HOSPITAL | Age: 66
Discharge: HOME | End: 2021-03-22
Attending: FAMILY MEDICINE
Payer: MEDICARE

## 2021-03-22 DIAGNOSIS — Z12.31 ENCOUNTER FOR SCREENING MAMMOGRAM FOR MALIGNANT NEOPLASM OF BREAST: ICD-10-CM

## 2021-03-22 PROCEDURE — 77063 BREAST TOMOSYNTHESIS BI: CPT | Performed by: FAMILY MEDICINE

## 2021-03-22 PROCEDURE — 77067 SCR MAMMO BI INCL CAD: CPT | Performed by: FAMILY MEDICINE

## 2021-03-23 LAB
ALBUMIN/GLOBULIN RATIO: 1.5 (CALC) (ref 1–2.5)
ALBUMIN: 4.3 G/DL (ref 3.6–5.1)
ALKALINE PHOSPHATASE: 71 U/L (ref 37–153)
ALT: 12 U/L (ref 6–29)
AST: 16 U/L (ref 10–35)
BILIRUBIN, TOTAL: 0.5 MG/DL (ref 0.2–1.2)
BUN: 15 MG/DL (ref 7–25)
CALCIUM: 10 MG/DL (ref 8.6–10.4)
CARBON DIOXIDE: 28 MMOL/L (ref 20–32)
CHLORIDE: 107 MMOL/L (ref 98–110)
CHOL/HDLC RATIO: 3.3 (CALC)
CHOLESTEROL, TOTAL: 254 MG/DL
CREATININE: 0.98 MG/DL (ref 0.5–0.99)
EGFR IF AFRICN AM: 70 ML/MIN/1.73M2
EGFR IF NONAFRICN AM: 61 ML/MIN/1.73M2
GLOBULIN: 2.8 G/DL (CALC) (ref 1.9–3.7)
GLUCOSE: 85 MG/DL (ref 65–99)
HDL CHOLESTEROL: 77 MG/DL
LDL-CHOLESTEROL: 157 MG/DL (CALC)
NON-HDL CHOLESTEROL: 177 MG/DL (CALC)
POTASSIUM: 4.1 MMOL/L (ref 3.5–5.3)
PROTEIN, TOTAL: 7.1 G/DL (ref 6.1–8.1)
SODIUM: 141 MMOL/L (ref 135–146)
TRIGLYCERIDES: 90 MG/DL

## 2021-04-05 ENCOUNTER — OFFICE VISIT (OUTPATIENT)
Dept: FAMILY MEDICINE CLINIC | Facility: CLINIC | Age: 66
End: 2021-04-05
Payer: MEDICARE

## 2021-04-05 VITALS
HEART RATE: 74 BPM | SYSTOLIC BLOOD PRESSURE: 138 MMHG | DIASTOLIC BLOOD PRESSURE: 80 MMHG | BODY MASS INDEX: 24.41 KG/M2 | WEIGHT: 143 LBS | TEMPERATURE: 98 F | RESPIRATION RATE: 18 BRPM | OXYGEN SATURATION: 100 % | HEIGHT: 64 IN

## 2021-04-05 DIAGNOSIS — Z13.820 OSTEOPOROSIS SCREENING: ICD-10-CM

## 2021-04-05 DIAGNOSIS — H53.8 BLURRY VISION, BILATERAL: ICD-10-CM

## 2021-04-05 DIAGNOSIS — I10 ESSENTIAL HYPERTENSION, BENIGN: Primary | ICD-10-CM

## 2021-04-05 DIAGNOSIS — Z12.11 COLON CANCER SCREENING: ICD-10-CM

## 2021-04-05 DIAGNOSIS — R07.9 CHEST PAIN, UNSPECIFIED TYPE: ICD-10-CM

## 2021-04-05 DIAGNOSIS — M25.50 PAIN IN JOINT, MULTIPLE SITES: ICD-10-CM

## 2021-04-05 DIAGNOSIS — E78.2 MIXED HYPERLIPIDEMIA: ICD-10-CM

## 2021-04-05 PROCEDURE — 99214 OFFICE O/P EST MOD 30 MIN: CPT | Performed by: FAMILY MEDICINE

## 2021-04-05 PROCEDURE — 93000 ELECTROCARDIOGRAM COMPLETE: CPT | Performed by: FAMILY MEDICINE

## 2021-04-05 RX ORDER — ATORVASTATIN CALCIUM 10 MG/1
10 TABLET, FILM COATED ORAL NIGHTLY
Qty: 90 TABLET | Refills: 1 | Status: SHIPPED | OUTPATIENT
Start: 2021-04-05 | End: 2021-11-01

## 2021-04-05 RX ORDER — IBUPROFEN 800 MG/1
800 TABLET ORAL EVERY 8 HOURS PRN
Qty: 90 TABLET | Refills: 1 | Status: SHIPPED | OUTPATIENT
Start: 2021-04-05 | End: 2022-02-02

## 2021-04-05 RX ORDER — METOPROLOL SUCCINATE 50 MG/1
50 TABLET, EXTENDED RELEASE ORAL DAILY
Qty: 90 TABLET | Refills: 1 | Status: SHIPPED | OUTPATIENT
Start: 2021-04-05 | End: 2021-12-09

## 2021-04-05 NOTE — PATIENT INSTRUCTIONS
Lifestyle Changes to Control Cholesterol  You can control your cholesterol through diet, exercise, weight management, quitting smoking, stress management, and taking your medicines right. These things can also lower your risk for cardiovascular disease. arts  · Tennis  · Riding a bicycle or stationary bike  · Dancing  Managing your weight  If you are overweight or obese, your healthcare provider will work with you to help you lose weight and lower your BMI (body mass index).  Making diet changes and gettin (jaundice), abdominal pain, and headache. · Don’t skip a dose or stop taking your medicine because you feel better or because your cholesterol numbers go down. Never stop taking your medicine unless your healthcare provider has told you it’s OK.   · Ask yo test. Also ask your provider about any side effects your medicines may cause. Let your provider know about any side effects you have. You may need to take more than one medicine to reach the cholesterol goals that you and your provider decide on.    Penny Ronquillo

## 2021-04-05 NOTE — PROGRESS NOTES
CHIEF COMPLAINT: No chief complaint on file. HPI:     Whitley Lo is a 72year old female present for HTN and joint pain f-u. HTN f-u: Pt was previously on Lisinopril and Metoprolol.  She is currently on just Metoprolol 50 mg daily, tole discharge.             HISTORY:  Past Medical History:   Diagnosis Date   • Back pain    • Essential hypertension       Past Surgical History:   Procedure Laterality Date   • COLON SURGERY     • TRANSFORAMINAL LUMBAR EPIDURAL STEROID INJECTION SINGLE LEVEL headaches. Pertinent positives and negatives noted in the the HPI.     PHYSICAL EXAM:   /82 (BP Location: Right arm, Patient Position: Sitting, Cuff Size: adult)   Pulse 74   Temp 97.6 °F (36.4 °C) (Temporal)   Resp 18   Ht 5' 4\" (1.626 m) • GLUCOSE 03/22/2021 85    • UREA NITROGEN (BUN) 03/22/2021 15    • CREATININE 03/22/2021 0.98    • eGFR NON-AFR.  AMERICAN 03/22/2021 61    • eGFR  03/22/2021 70    • BUN/CREATININE RATIO 49/86/8415 NOT APPLICABLE    • SODIUM 34/38/1568 1 multiple joints     - ibuprofen 800 MG Oral Tab; Take 1 tablet (800 mg total) by mouth every 8 (eight) hours as needed for Pain. Dispense: 90 tablet; Refill: 1    4.  Chest pain, unspecified type  - noncardiac, since EKG today was sinus marco no acute real Allergic rhinitis due to other allergen     Carpal tunnel syndrome of left wrist     Essential hypertension, benign     Family history of early CAD     Herpes genitalis     Parotid mass     Prediabetes     Mixed hyperlipidemia     TIA (transient ischemic a

## 2021-07-20 ENCOUNTER — LAB ENCOUNTER (OUTPATIENT)
Dept: LAB | Facility: HOSPITAL | Age: 66
End: 2021-07-20
Attending: INTERNAL MEDICINE
Payer: MEDICARE

## 2021-07-20 DIAGNOSIS — Z01.818 PRE-OP TESTING: ICD-10-CM

## 2021-07-20 LAB — SARS-COV-2 RNA RESP QL NAA+PROBE: NOT DETECTED

## 2021-07-23 PROBLEM — D12.3 BENIGN NEOPLASM OF TRANSVERSE COLON: Status: ACTIVE | Noted: 2021-07-23

## 2021-07-23 PROBLEM — Z12.11 SPECIAL SCREENING FOR MALIGNANT NEOPLASMS, COLON: Status: ACTIVE | Noted: 2021-07-23

## 2021-08-05 ENCOUNTER — PATIENT MESSAGE (OUTPATIENT)
Dept: FAMILY MEDICINE CLINIC | Facility: CLINIC | Age: 66
End: 2021-08-05

## 2021-08-05 NOTE — TELEPHONE ENCOUNTER
From: Mary Alfonso  To: Yin Lakhani MD  Sent: 8/5/2021 10:07 AM CDT  Subject: Other    Greetings Dr Luis Eduardo Catalan    I'm interested in getting the vaccine for the covic virus. I am very well to this point .  Can you suggest which one as shoul

## 2021-08-23 ENCOUNTER — IMMUNIZATION (OUTPATIENT)
Dept: LAB | Facility: HOSPITAL | Age: 66
End: 2021-08-23
Attending: EMERGENCY MEDICINE
Payer: MEDICARE

## 2021-08-23 DIAGNOSIS — Z23 NEED FOR VACCINATION: Primary | ICD-10-CM

## 2021-08-23 PROCEDURE — 0001A SARSCOV2 VAC 30MCG/0.3ML IM: CPT

## 2021-09-13 ENCOUNTER — IMMUNIZATION (OUTPATIENT)
Dept: LAB | Facility: HOSPITAL | Age: 66
End: 2021-09-13
Attending: EMERGENCY MEDICINE
Payer: MEDICARE

## 2021-09-13 DIAGNOSIS — Z23 NEED FOR VACCINATION: Primary | ICD-10-CM

## 2021-09-13 PROCEDURE — 0002A SARSCOV2 VAC 30MCG/0.3ML IM: CPT

## 2021-10-31 DIAGNOSIS — E78.2 MIXED HYPERLIPIDEMIA: ICD-10-CM

## 2021-11-01 RX ORDER — ATORVASTATIN CALCIUM 10 MG/1
TABLET, FILM COATED ORAL
Qty: 90 TABLET | Refills: 1 | Status: SHIPPED | OUTPATIENT
Start: 2021-11-01 | End: 2021-12-20

## 2021-11-01 NOTE — TELEPHONE ENCOUNTER
Refill Passed Protocol:     Pt requesting refill of   Requested Prescriptions     Pending Prescriptions Disp Refills   • ATORVASTATIN 10 MG Oral Tab [Pharmacy Med Name: ATORVASTATIN 10 MG TABLET] 90 tablet 1     Sig: TAKE 1 TABLET BY MOUTH EVERY DAY AT NIG

## 2021-12-01 ENCOUNTER — TELEPHONE (OUTPATIENT)
Dept: FAMILY MEDICINE CLINIC | Facility: CLINIC | Age: 66
End: 2021-12-01

## 2021-12-01 NOTE — TELEPHONE ENCOUNTER
Piedad Atkinson from [facility]) called to inform pt has a scheduled Dexa scan 12/03/21. Dx code Osteoporosis screening (Z13.820) not covered for Medicare past age 72.  Please advice if there is a different code you would like to use

## 2021-12-02 NOTE — TELEPHONE ENCOUNTER
Called Clarke Gutierrez back and Cassandra Ferrer was no longer working but provided the Postmenopausal state Z code to office. They will update.

## 2021-12-07 DIAGNOSIS — I10 ESSENTIAL HYPERTENSION, BENIGN: ICD-10-CM

## 2021-12-08 NOTE — TELEPHONE ENCOUNTER
Refill Failed Protocol:     Pt requesting refill of   Requested Prescriptions     Pending Prescriptions Disp Refills   • METOPROLOL SUCCINATE 50 MG Oral Tablet 24 Hr [Pharmacy Med Name: METOPROLOL SUCC ER 50 MG TAB] 90 tablet 1     Sig: TAKE 1 TABLET BY MO

## 2021-12-09 RX ORDER — METOPROLOL SUCCINATE 50 MG/1
TABLET, EXTENDED RELEASE ORAL
Qty: 90 TABLET | Refills: 1 | Status: SHIPPED | OUTPATIENT
Start: 2021-12-09 | End: 2021-12-17

## 2021-12-14 ENCOUNTER — OFFICE VISIT (OUTPATIENT)
Dept: FAMILY MEDICINE CLINIC | Facility: CLINIC | Age: 66
End: 2021-12-14
Payer: MEDICARE

## 2021-12-14 VITALS
WEIGHT: 147 LBS | OXYGEN SATURATION: 96 % | BODY MASS INDEX: 25.1 KG/M2 | RESPIRATION RATE: 18 BRPM | HEART RATE: 75 BPM | TEMPERATURE: 97 F | HEIGHT: 64 IN | SYSTOLIC BLOOD PRESSURE: 140 MMHG | DIASTOLIC BLOOD PRESSURE: 86 MMHG

## 2021-12-14 DIAGNOSIS — Z01.812 ENCOUNTER FOR PREOPERATIVE SCREENING LABORATORY TESTING FOR COVID-19 VIRUS: ICD-10-CM

## 2021-12-14 DIAGNOSIS — Z20.822 ENCOUNTER FOR PREOPERATIVE SCREENING LABORATORY TESTING FOR COVID-19 VIRUS: ICD-10-CM

## 2021-12-14 DIAGNOSIS — R07.9 CHEST PAIN, UNSPECIFIED TYPE: ICD-10-CM

## 2021-12-14 DIAGNOSIS — E78.2 MIXED HYPERLIPIDEMIA: ICD-10-CM

## 2021-12-14 DIAGNOSIS — R63.2 EXCESSIVE HUNGER: ICD-10-CM

## 2021-12-14 DIAGNOSIS — I10 BENIGN ESSENTIAL HYPERTENSION: Primary | ICD-10-CM

## 2021-12-14 PROCEDURE — 99214 OFFICE O/P EST MOD 30 MIN: CPT | Performed by: FAMILY MEDICINE

## 2021-12-14 NOTE — PATIENT INSTRUCTIONS
Uncertain Causes of Chest Pain    Chest pain can happen for a number of reasons. Sometimes the cause can't be determined.  If your condition does not seem serious, and your pain does not appear to be coming from your heart, your healthcare provider may re provider  · Swelling, pain or redness in one leg  Micah last reviewed this educational content on 5/1/2018  © 0353-5803 [employer]. All rights reserved. This information is not intended as a substitute for professional medical care.  Chrissy

## 2021-12-14 NOTE — PROGRESS NOTES
CHIEF COMPLAINT: Patient presents with:  Hypertension        HPI:     Abhishek Fair is a 77year old female presents for HTN and HL f-u. Chest pain f-u. HTN f-u: Pt states she is feeling well.  She has no HA, no CP, palpitations, and no leg swel palpitations, or dizziness. She has no pain radiating into her left arm, but sometimes pain radiates to her left shoulder.  She was experiencing more of this pain when she was working more hours, but since cutting down to part-time work since early retirem RASH, OTHER (SEE COMMENTS)    Comment:states she has never had it so \"doctor won't give             it to me at an older age\"    PSFH elements reviewed from today and agreed except as otherwise stated in HPI.  ROS:     Review of Systems   Constitutional: Affect: Mood normal.         Behavior: Behavior normal.          LABS     No visits with results within 2 Month(s) from this visit.    Latest known visit with results is:   [facility] Lab Encounter on 07/20/2021   Component Date Value   • SARS-CoV-2 ([place]) 07/2 03/22/2022  Fall Risk Screening due on 04/05/2022  Colonoscopy due on 07/23/2026      Patient/Caregiver Education: There are no barriers to learning. Medical education done. Outcome: Patient verbalizes understanding and agrees with plan.  Advised to call

## 2021-12-17 ENCOUNTER — PATIENT MESSAGE (OUTPATIENT)
Dept: FAMILY MEDICINE CLINIC | Facility: CLINIC | Age: 66
End: 2021-12-17

## 2021-12-17 DIAGNOSIS — E78.2 MIXED HYPERLIPIDEMIA: ICD-10-CM

## 2021-12-17 DIAGNOSIS — I10 ESSENTIAL HYPERTENSION, BENIGN: ICD-10-CM

## 2021-12-17 RX ORDER — METOPROLOL SUCCINATE 50 MG/1
50 TABLET, EXTENDED RELEASE ORAL DAILY
Qty: 90 TABLET | Refills: 1 | Status: SHIPPED | OUTPATIENT
Start: 2021-12-17

## 2021-12-17 NOTE — TELEPHONE ENCOUNTER
Refill Passed Protocol:     Pt requesting refill of   Requested Prescriptions     Pending Prescriptions Disp Refills   • metoprolol succinate 50 MG Oral Tablet 24 Hr 90 tablet 1     Sig: Take 1 tablet (50 mg total) by mouth daily.        Refill was approved

## 2021-12-17 NOTE — TELEPHONE ENCOUNTER
From: Whitley Lo  To: Skye Belcher MD  Sent: 12/17/2021 2:47 PM CST  Subject: Other    Hi Dr Alethea Perez all is well. I am requesting prescription information to be sent to [facility] I'll.  [facility] has made 3 a

## 2021-12-20 RX ORDER — ATORVASTATIN CALCIUM 10 MG/1
10 TABLET, FILM COATED ORAL NIGHTLY
Qty: 90 TABLET | Refills: 1 | Status: SHIPPED | OUTPATIENT
Start: 2021-12-20

## 2021-12-20 NOTE — TELEPHONE ENCOUNTER
Refill Passed Protocol:     Pt requesting refill of   Requested Prescriptions     Pending Prescriptions Disp Refills   • atorvastatin 10 MG Oral Tab 90 tablet 1     Sig: Take 1 tablet (10 mg total) by mouth nightly.      Signed Prescriptions Disp Refills

## 2022-01-29 ENCOUNTER — HOSPITAL ENCOUNTER (OUTPATIENT)
Dept: BONE DENSITY | Age: 67
Discharge: HOME | End: 2022-01-29
Attending: FAMILY MEDICINE
Payer: MEDICARE

## 2022-01-29 DIAGNOSIS — Z13.820 OSTEOPOROSIS SCREENING: ICD-10-CM

## 2022-01-29 PROCEDURE — 77080 DXA BONE DENSITY AXIAL: CPT | Performed by: FAMILY MEDICINE

## 2022-01-30 PROBLEM — M85.859 OSTEOPENIA OF NECK OF FEMUR: Status: ACTIVE | Noted: 2022-01-30

## 2022-01-31 ENCOUNTER — TELEPHONE (OUTPATIENT)
Dept: FAMILY MEDICINE CLINIC | Facility: CLINIC | Age: 67
End: 2022-01-31

## 2022-01-31 NOTE — TELEPHONE ENCOUNTER
----- Message from Karly Dupree MD sent at 1/30/2022 11:34 PM CST -----  Please call pt to inform:    DEXA scan is showing she has osteopenia (which is the precursor to osteoporosis).   I recommend that she make sure she is taking at least 1200 m

## 2022-01-31 NOTE — TELEPHONE ENCOUNTER
Spoke to pt and let them know results and recommendation per Miguel Eli MD .  Patient voiced understanding.  Per pt request, also sent message to pt via [employer] with information

## 2022-02-02 ENCOUNTER — PATIENT MESSAGE (OUTPATIENT)
Dept: FAMILY MEDICINE CLINIC | Facility: CLINIC | Age: 67
End: 2022-02-02

## 2022-02-02 RX ORDER — IBUPROFEN 800 MG/1
800 TABLET ORAL EVERY 8 HOURS PRN
Qty: 90 TABLET | Refills: 1 | Status: SHIPPED | OUTPATIENT
Start: 2022-02-02 | End: 2022-03-10 | Stop reason: ALTCHOICE

## 2022-02-02 NOTE — TELEPHONE ENCOUNTER
From: Jacqui Vallecillo  To: Sabina Peralta MD  Sent: 2022 3:24 PM CST  Subject: Other    Good Afternoon   Sorry to bother you again. Can you please approved a refill for the ibuprofen at [facility] for me. I never purchased the other refills b4 they  because I still had a few pills.  Thank you again

## 2022-02-02 NOTE — TELEPHONE ENCOUNTER
From: Portillo Wells  To: Lakshmi Alvarez MD  Sent: 2/2/2022 10:08 AM CST  Subject: Other    GM Dr Mar Newell all is well. I now have [place] advantage so I will need my prescriptions update sent to [facility] please at [address] [place] [address]  [phone] to avoid co payment. I will be coming in for a wellness check and F/U with you this mo th as well.  Thank you and have a wonderful and safe day

## 2022-02-14 ENCOUNTER — HOSPITAL ENCOUNTER (INPATIENT)
Age: 67
LOS: 7 days | Discharge: HOME HEALTH | DRG: 156 | End: 2022-02-23
Attending: EMERGENCY MEDICINE | Admitting: INTERNAL MEDICINE

## 2022-02-14 ENCOUNTER — APPOINTMENT (OUTPATIENT)
Dept: MRI IMAGING | Age: 67
DRG: 156 | End: 2022-02-14
Attending: EMERGENCY MEDICINE

## 2022-02-14 DIAGNOSIS — I16.0 HYPERTENSIVE URGENCY: ICD-10-CM

## 2022-02-14 DIAGNOSIS — R42 VERTIGO: Primary | ICD-10-CM

## 2022-02-14 PROBLEM — I10 HYPERTENSION: Status: ACTIVE | Noted: 2022-02-14

## 2022-02-14 PROBLEM — E78.5 DYSLIPIDEMIA: Status: ACTIVE | Noted: 2022-02-14

## 2022-02-14 LAB
ALBUMIN SERPL-MCNC: 3.7 G/DL (ref 3.6–5.1)
ALBUMIN/GLOB SERPL: 0.9 {RATIO} (ref 1–2.4)
ALP SERPL-CCNC: 82 UNITS/L (ref 45–117)
ALT SERPL-CCNC: 29 UNITS/L
ANION GAP SERPL CALC-SCNC: 8 MMOL/L (ref 10–20)
AST SERPL-CCNC: 18 UNITS/L
ATRIAL RATE (BPM): 60
BASOPHILS # BLD: 0.1 K/MCL (ref 0–0.3)
BASOPHILS NFR BLD: 1 %
BILIRUB SERPL-MCNC: 0.3 MG/DL (ref 0.2–1)
BUN SERPL-MCNC: 11 MG/DL (ref 6–20)
BUN/CREAT SERPL: 15 (ref 7–25)
CALCIUM SERPL-MCNC: 9.4 MG/DL (ref 8.4–10.2)
CHLORIDE SERPL-SCNC: 110 MMOL/L (ref 98–107)
CO2 SERPL-SCNC: 25 MMOL/L (ref 21–32)
CREAT SERPL-MCNC: 0.75 MG/DL (ref 0.51–0.95)
DEPRECATED RDW RBC: 48 FL (ref 39–50)
EOSINOPHIL # BLD: 0 K/MCL (ref 0–0.5)
EOSINOPHIL NFR BLD: 0 %
ERYTHROCYTE [DISTWIDTH] IN BLOOD: 14.4 % (ref 11–15)
FASTING DURATION TIME PATIENT: ABNORMAL H
FLUAV RNA RESP QL NAA+PROBE: NOT DETECTED
FLUBV RNA RESP QL NAA+PROBE: NOT DETECTED
GFR SERPLBLD BASED ON 1.73 SQ M-ARVRAT: >90 ML/MIN
GLOBULIN SER-MCNC: 3.9 G/DL (ref 2–4)
GLUCOSE SERPL-MCNC: 131 MG/DL (ref 70–99)
HCT VFR BLD CALC: 41.8 % (ref 36–46.5)
HGB BLD-MCNC: 13.1 G/DL (ref 12–15.5)
IMM GRANULOCYTES # BLD AUTO: 0.1 K/MCL (ref 0–0.2)
IMM GRANULOCYTES # BLD: 1 %
LIPASE SERPL-CCNC: 84 UNITS/L (ref 73–393)
LYMPHOCYTES # BLD: 0.9 K/MCL (ref 1–4)
LYMPHOCYTES NFR BLD: 9 %
MCH RBC QN AUTO: 28.4 PG (ref 26–34)
MCHC RBC AUTO-ENTMCNC: 31.3 G/DL (ref 32–36.5)
MCV RBC AUTO: 90.7 FL (ref 78–100)
MONOCYTES # BLD: 0.4 K/MCL (ref 0.3–0.9)
MONOCYTES NFR BLD: 4 %
NEUTROPHILS # BLD: 8.6 K/MCL (ref 1.8–7.7)
NEUTROPHILS NFR BLD: 85 %
NRBC BLD MANUAL-RTO: 0 /100 WBC
P AXIS (DEGREES): 56
PLATELET # BLD AUTO: 224 K/MCL (ref 140–450)
POTASSIUM SERPL-SCNC: 4.2 MMOL/L (ref 3.4–5.1)
PR-INTERVAL (MSEC): 144
PROT SERPL-MCNC: 7.6 G/DL (ref 6.4–8.2)
QRS-INTERVAL (MSEC): 72
QT-INTERVAL (MSEC): 448
QTC: 448
R AXIS (DEGREES): 53
RAINBOW EXTRA TUBES HOLD SPECIMEN: NORMAL
RBC # BLD: 4.61 MIL/MCL (ref 4–5.2)
REPORT TEXT: NORMAL
RSV AG NPH QL IA.RAPID: NOT DETECTED
SARS-COV-2 RNA RESP QL NAA+PROBE: NOT DETECTED
SERVICE CMNT-IMP: NORMAL
SERVICE CMNT-IMP: NORMAL
SODIUM SERPL-SCNC: 139 MMOL/L (ref 135–145)
T AXIS (DEGREES): -23
TROPONIN I SERPL DL<=0.01 NG/ML-MCNC: 8 NG/L
VENTRICULAR RATE EKG/MIN (BPM): 60
WBC # BLD: 10.1 K/MCL (ref 4.2–11)

## 2022-02-14 PROCEDURE — 96361 HYDRATE IV INFUSION ADD-ON: CPT

## 2022-02-14 PROCEDURE — 99285 EMERGENCY DEPT VISIT HI MDM: CPT

## 2022-02-14 PROCEDURE — 10002800 HB RX 250 W HCPCS: Performed by: EMERGENCY MEDICINE

## 2022-02-14 PROCEDURE — 99220 INITIAL OBSERVATION CARE,LEVL III: CPT | Performed by: INTERNAL MEDICINE

## 2022-02-14 PROCEDURE — 0241U COVID/FLU/RSV PANEL: CPT | Performed by: EMERGENCY MEDICINE

## 2022-02-14 PROCEDURE — 70551 MRI BRAIN STEM W/O DYE: CPT

## 2022-02-14 PROCEDURE — 10002803 HB RX 637: Performed by: EMERGENCY MEDICINE

## 2022-02-14 PROCEDURE — G0378 HOSPITAL OBSERVATION PER HR: HCPCS

## 2022-02-14 PROCEDURE — 96376 TX/PRO/DX INJ SAME DRUG ADON: CPT

## 2022-02-14 PROCEDURE — 10004651 HB RX, NO CHARGE ITEM: Performed by: INTERNAL MEDICINE

## 2022-02-14 PROCEDURE — 10002803 HB RX 637: Performed by: INTERNAL MEDICINE

## 2022-02-14 PROCEDURE — 85025 COMPLETE CBC W/AUTO DIFF WBC: CPT | Performed by: EMERGENCY MEDICINE

## 2022-02-14 PROCEDURE — 96374 THER/PROPH/DIAG INJ IV PUSH: CPT

## 2022-02-14 PROCEDURE — 10002800 HB RX 250 W HCPCS: Performed by: INTERNAL MEDICINE

## 2022-02-14 PROCEDURE — 83690 ASSAY OF LIPASE: CPT | Performed by: EMERGENCY MEDICINE

## 2022-02-14 PROCEDURE — 10002807 HB RX 258: Performed by: EMERGENCY MEDICINE

## 2022-02-14 PROCEDURE — 84484 ASSAY OF TROPONIN QUANT: CPT | Performed by: EMERGENCY MEDICINE

## 2022-02-14 PROCEDURE — 80053 COMPREHEN METABOLIC PANEL: CPT | Performed by: EMERGENCY MEDICINE

## 2022-02-14 PROCEDURE — 93005 ELECTROCARDIOGRAM TRACING: CPT | Performed by: EMERGENCY MEDICINE

## 2022-02-14 PROCEDURE — 93010 ELECTROCARDIOGRAM REPORT: CPT | Performed by: INTERNAL MEDICINE

## 2022-02-14 RX ORDER — METOPROLOL SUCCINATE 50 MG/1
50 TABLET, EXTENDED RELEASE ORAL EVERY MORNING
COMMUNITY

## 2022-02-14 RX ORDER — ONDANSETRON 2 MG/ML
4 INJECTION INTRAMUSCULAR; INTRAVENOUS 2 TIMES DAILY PRN
Status: DISCONTINUED | OUTPATIENT
Start: 2022-02-14 | End: 2022-02-23 | Stop reason: HOSPADM

## 2022-02-14 RX ORDER — ATORVASTATIN CALCIUM 10 MG/1
10 TABLET, FILM COATED ORAL NIGHTLY
COMMUNITY

## 2022-02-14 RX ORDER — 0.9 % SODIUM CHLORIDE 0.9 %
2 VIAL (ML) INJECTION EVERY 12 HOURS SCHEDULED
Status: DISCONTINUED | OUTPATIENT
Start: 2022-02-14 | End: 2022-02-23 | Stop reason: HOSPADM

## 2022-02-14 RX ORDER — IBUPROFEN 800 MG/1
800 TABLET ORAL EVERY 8 HOURS PRN
Status: ON HOLD | COMMUNITY
End: 2022-02-22 | Stop reason: HOSPADM

## 2022-02-14 RX ORDER — ONDANSETRON 2 MG/ML
4 INJECTION INTRAMUSCULAR; INTRAVENOUS ONCE
Status: COMPLETED | OUTPATIENT
Start: 2022-02-14 | End: 2022-02-14

## 2022-02-14 RX ORDER — ACETAMINOPHEN 325 MG/1
650 TABLET ORAL EVERY 4 HOURS PRN
Status: DISCONTINUED | OUTPATIENT
Start: 2022-02-14 | End: 2022-02-23 | Stop reason: HOSPADM

## 2022-02-14 RX ORDER — ATORVASTATIN CALCIUM 10 MG/1
10 TABLET, FILM COATED ORAL NIGHTLY
Status: DISCONTINUED | OUTPATIENT
Start: 2022-02-14 | End: 2022-02-23 | Stop reason: HOSPADM

## 2022-02-14 RX ORDER — AMOXICILLIN 250 MG
2 CAPSULE ORAL DAILY PRN
Status: DISCONTINUED | OUTPATIENT
Start: 2022-02-14 | End: 2022-02-23 | Stop reason: HOSPADM

## 2022-02-14 RX ORDER — MECLIZINE HYDROCHLORIDE 25 MG/1
25 TABLET ORAL 3 TIMES DAILY PRN
Status: DISCONTINUED | OUTPATIENT
Start: 2022-02-14 | End: 2022-02-14

## 2022-02-14 RX ORDER — METOPROLOL SUCCINATE 100 MG/1
50 TABLET, EXTENDED RELEASE ORAL EVERY MORNING
Status: DISCONTINUED | OUTPATIENT
Start: 2022-02-15 | End: 2022-02-23 | Stop reason: HOSPADM

## 2022-02-14 RX ORDER — DIAZEPAM 5 MG/1
5 TABLET ORAL ONCE
Status: COMPLETED | OUTPATIENT
Start: 2022-02-14 | End: 2022-02-14

## 2022-02-14 RX ORDER — MECLIZINE HYDROCHLORIDE 25 MG/1
25 TABLET ORAL ONCE
Status: COMPLETED | OUTPATIENT
Start: 2022-02-14 | End: 2022-02-14

## 2022-02-14 RX ORDER — MECLIZINE HYDROCHLORIDE 25 MG/1
25 TABLET ORAL 3 TIMES DAILY
Status: DISCONTINUED | OUTPATIENT
Start: 2022-02-14 | End: 2022-02-16

## 2022-02-14 RX ADMIN — ONDANSETRON 4 MG: 2 INJECTION INTRAMUSCULAR; INTRAVENOUS at 13:28

## 2022-02-14 RX ADMIN — MECLIZINE HYDROCHLORIDE 25 MG: 25 TABLET ORAL at 21:32

## 2022-02-14 RX ADMIN — ATORVASTATIN CALCIUM 10 MG: 10 TABLET, FILM COATED ORAL at 21:32

## 2022-02-14 RX ADMIN — ONDANSETRON 4 MG: 2 INJECTION INTRAMUSCULAR; INTRAVENOUS at 21:32

## 2022-02-14 RX ADMIN — DIAZEPAM 5 MG: 5 TABLET ORAL at 13:31

## 2022-02-14 RX ADMIN — SODIUM CHLORIDE 1000 ML: 0.9 INJECTION, SOLUTION INTRAVENOUS at 13:28

## 2022-02-14 RX ADMIN — MECLIZINE HYDROCHLORIDE 25 MG: 25 TABLET ORAL at 14:57

## 2022-02-14 RX ADMIN — SODIUM CHLORIDE, PRESERVATIVE FREE 2 ML: 5 INJECTION INTRAVENOUS at 21:34

## 2022-02-14 ASSESSMENT — ENCOUNTER SYMPTOMS
COUGH: 0
FEVER: 0
SINUS PAIN: 0
EYE PAIN: 0
BACK PAIN: 0
WEAKNESS: 0
ABDOMINAL PAIN: 0
SHORTNESS OF BREATH: 0
VOMITING: 1
NAUSEA: 1
DIZZINESS: 1
CHEST TIGHTNESS: 0
DIARRHEA: 1
NUMBNESS: 0
CONFUSION: 0
WOUND: 0

## 2022-02-14 ASSESSMENT — LIFESTYLE VARIABLES
HOW MANY STANDARD DRINKS CONTAINING ALCOHOL DO YOU HAVE ON A TYPICAL DAY: 0,1 OR 2
AUDIT-C TOTAL SCORE: 0
ALCOHOL_USE_STATUS: NO OR LOW RISK WITH VALIDATED TOOL
HOW OFTEN DO YOU HAVE A DRINK CONTAINING ALCOHOL: NEVER
HOW OFTEN DO YOU HAVE 6 OR MORE DRINKS ON ONE OCCASION: NEVER

## 2022-02-14 ASSESSMENT — ACTIVITIES OF DAILY LIVING (ADL)
RECENT_DECLINE_ADL: NO
CHRONIC_PAIN_PRESENT: NO
ADL_SHORT_OF_BREATH: NO
ADL_BEFORE_ADMISSION: INDEPENDENT
ADL_SCORE: 12

## 2022-02-14 ASSESSMENT — PATIENT HEALTH QUESTIONNAIRE - PHQ9
2. FEELING DOWN, DEPRESSED OR HOPELESS: SEVERAL DAYS
IS PATIENT ABLE TO COMPLETE PHQ2 OR PHQ9: YES
SUM OF ALL RESPONSES TO PHQ9 QUESTIONS 1 AND 2: 1
CLINICAL INTERPRETATION OF PHQ2 SCORE: NO FURTHER SCREENING NEEDED
SUM OF ALL RESPONSES TO PHQ9 QUESTIONS 1 AND 2: 1
1. LITTLE INTEREST OR PLEASURE IN DOING THINGS: NOT AT ALL

## 2022-02-14 ASSESSMENT — COGNITIVE AND FUNCTIONAL STATUS - GENERAL
DO YOU HAVE SERIOUS DIFFICULTY WALKING OR CLIMBING STAIRS: NO
ARE YOU DEAF OR DO YOU HAVE SERIOUS DIFFICULTY  HEARING: NO
DO YOU HAVE DIFFICULTY DRESSING OR BATHING: NO
BECAUSE OF A PHYSICAL, MENTAL, OR EMOTIONAL CONDITION, DO YOU HAVE DIFFICULTY DOING ERRANDS ALONE: NO
BECAUSE OF A PHYSICAL, MENTAL, OR EMOTIONAL CONDITION, DO YOU HAVE SERIOUS DIFFICULTY CONCENTRATING, REMEMBERING OR MAKING DECISIONS: NO
ARE YOU BLIND OR DO YOU HAVE SERIOUS DIFFICULTY SEEING, EVEN WHEN WEARING GLASSES: NO

## 2022-02-14 ASSESSMENT — PAIN SCALES - GENERAL: PAINLEVEL_OUTOF10: 0

## 2022-02-14 ASSESSMENT — COLUMBIA-SUICIDE SEVERITY RATING SCALE - C-SSRS
1. WITHIN THE PAST MONTH, HAVE YOU WISHED YOU WERE DEAD OR WISHED YOU COULD GO TO SLEEP AND NOT WAKE UP?: NO
2. HAVE YOU ACTUALLY HAD ANY THOUGHTS OF KILLING YOURSELF?: NO
IS THE PATIENT ABLE TO COMPLETE C-SSRS: YES
6. HAVE YOU EVER DONE ANYTHING, STARTED TO DO ANYTHING, OR PREPARED TO DO ANYTHING TO END YOUR LIFE?: NO

## 2022-02-15 LAB
ANION GAP SERPL CALC-SCNC: 7 MMOL/L (ref 10–20)
BUN SERPL-MCNC: 13 MG/DL (ref 6–20)
BUN/CREAT SERPL: 13 (ref 7–25)
CALCIUM SERPL-MCNC: 9.1 MG/DL (ref 8.4–10.2)
CHLORIDE SERPL-SCNC: 113 MMOL/L (ref 98–107)
CO2 SERPL-SCNC: 24 MMOL/L (ref 21–32)
CREAT SERPL-MCNC: 1.02 MG/DL (ref 0.51–0.95)
FASTING DURATION TIME PATIENT: ABNORMAL H
GFR SERPLBLD BASED ON 1.73 SQ M-ARVRAT: 66 ML/MIN
GLUCOSE SERPL-MCNC: 103 MG/DL (ref 70–99)
POTASSIUM SERPL-SCNC: 3.4 MMOL/L (ref 3.4–5.1)
POTASSIUM SERPL-SCNC: 3.9 MMOL/L (ref 3.4–5.1)
RAINBOW EXTRA TUBES HOLD SPECIMEN: NORMAL
SODIUM SERPL-SCNC: 141 MMOL/L (ref 135–145)

## 2022-02-15 PROCEDURE — 36415 COLL VENOUS BLD VENIPUNCTURE: CPT | Performed by: INTERNAL MEDICINE

## 2022-02-15 PROCEDURE — 10002800 HB RX 250 W HCPCS: Performed by: INTERNAL MEDICINE

## 2022-02-15 PROCEDURE — 10002803 HB RX 637: Performed by: INTERNAL MEDICINE

## 2022-02-15 PROCEDURE — 96376 TX/PRO/DX INJ SAME DRUG ADON: CPT

## 2022-02-15 PROCEDURE — 10004651 HB RX, NO CHARGE ITEM: Performed by: INTERNAL MEDICINE

## 2022-02-15 PROCEDURE — 80048 BASIC METABOLIC PNL TOTAL CA: CPT | Performed by: INTERNAL MEDICINE

## 2022-02-15 PROCEDURE — G0378 HOSPITAL OBSERVATION PER HR: HCPCS

## 2022-02-15 PROCEDURE — 99225 SUBSEQUENT OBSERVATION CARE LEVEL II: CPT | Performed by: FAMILY MEDICINE

## 2022-02-15 PROCEDURE — 10002803 HB RX 637: Performed by: FAMILY MEDICINE

## 2022-02-15 PROCEDURE — 84132 ASSAY OF SERUM POTASSIUM: CPT | Performed by: INTERNAL MEDICINE

## 2022-02-15 RX ORDER — POTASSIUM CHLORIDE 20 MEQ/1
40 TABLET, EXTENDED RELEASE ORAL ONCE
Status: COMPLETED | OUTPATIENT
Start: 2022-02-15 | End: 2022-02-15

## 2022-02-15 RX ORDER — HYDRALAZINE HYDROCHLORIDE 50 MG/1
25 TABLET, FILM COATED ORAL EVERY 6 HOURS PRN
Status: DISCONTINUED | OUTPATIENT
Start: 2022-02-15 | End: 2022-02-17

## 2022-02-15 RX ADMIN — POTASSIUM CHLORIDE 40 MEQ: 1500 TABLET, EXTENDED RELEASE ORAL at 09:00

## 2022-02-15 RX ADMIN — HYDRALAZINE HYDROCHLORIDE 25 MG: 50 TABLET, FILM COATED ORAL at 14:40

## 2022-02-15 RX ADMIN — MECLIZINE HYDROCHLORIDE 25 MG: 25 TABLET ORAL at 20:41

## 2022-02-15 RX ADMIN — MECLIZINE HYDROCHLORIDE 25 MG: 25 TABLET ORAL at 14:22

## 2022-02-15 RX ADMIN — MECLIZINE HYDROCHLORIDE 25 MG: 25 TABLET ORAL at 08:53

## 2022-02-15 RX ADMIN — POTASSIUM CHLORIDE 40 MEQ: 1500 TABLET, EXTENDED RELEASE ORAL at 22:32

## 2022-02-15 RX ADMIN — SODIUM CHLORIDE, PRESERVATIVE FREE 2 ML: 5 INJECTION INTRAVENOUS at 08:55

## 2022-02-15 RX ADMIN — METOPROLOL SUCCINATE 50 MG: 100 TABLET, EXTENDED RELEASE ORAL at 08:52

## 2022-02-15 RX ADMIN — SODIUM CHLORIDE, PRESERVATIVE FREE 2 ML: 5 INJECTION INTRAVENOUS at 20:42

## 2022-02-15 RX ADMIN — ATORVASTATIN CALCIUM 10 MG: 10 TABLET, FILM COATED ORAL at 20:41

## 2022-02-15 RX ADMIN — ONDANSETRON 4 MG: 2 INJECTION INTRAMUSCULAR; INTRAVENOUS at 11:49

## 2022-02-15 ASSESSMENT — PAIN SCALES - GENERAL
PAINLEVEL_OUTOF10: 0

## 2022-02-15 ASSESSMENT — COGNITIVE AND FUNCTIONAL STATUS - GENERAL
BECAUSE OF A PHYSICAL, MENTAL, OR EMOTIONAL CONDITION, DO YOU HAVE SERIOUS DIFFICULTY CONCENTRATING, REMEMBERING OR MAKING DECISIONS: NO
DO YOU HAVE SERIOUS DIFFICULTY WALKING OR CLIMBING STAIRS: NO
BECAUSE OF A PHYSICAL, MENTAL, OR EMOTIONAL CONDITION, DO YOU HAVE DIFFICULTY DOING ERRANDS ALONE: NO
DO YOU HAVE DIFFICULTY DRESSING OR BATHING: NO

## 2022-02-16 LAB
POTASSIUM SERPL-SCNC: 4.4 MMOL/L (ref 3.4–5.1)
RAINBOW EXTRA TUBES HOLD SPECIMEN: NORMAL

## 2022-02-16 PROCEDURE — G0378 HOSPITAL OBSERVATION PER HR: HCPCS

## 2022-02-16 PROCEDURE — 10002803 HB RX 637: Performed by: PSYCHIATRY & NEUROLOGY

## 2022-02-16 PROCEDURE — 36415 COLL VENOUS BLD VENIPUNCTURE: CPT | Performed by: FAMILY MEDICINE

## 2022-02-16 PROCEDURE — 10003585 HB ROOM CHARGE INTERMEDIATE CARE

## 2022-02-16 PROCEDURE — 10002800 HB RX 250 W HCPCS: Performed by: PSYCHIATRY & NEUROLOGY

## 2022-02-16 PROCEDURE — 10004651 HB RX, NO CHARGE ITEM: Performed by: INTERNAL MEDICINE

## 2022-02-16 PROCEDURE — 84132 ASSAY OF SERUM POTASSIUM: CPT | Performed by: FAMILY MEDICINE

## 2022-02-16 PROCEDURE — 10002803 HB RX 637: Performed by: INTERNAL MEDICINE

## 2022-02-16 PROCEDURE — 10002803 HB RX 637: Performed by: FAMILY MEDICINE

## 2022-02-16 PROCEDURE — 99223 1ST HOSP IP/OBS HIGH 75: CPT | Performed by: FAMILY MEDICINE

## 2022-02-16 RX ORDER — DIAZEPAM 5 MG/ML
5 INJECTION, SOLUTION INTRAMUSCULAR; INTRAVENOUS ONCE
Status: COMPLETED | OUTPATIENT
Start: 2022-02-16 | End: 2022-02-16

## 2022-02-16 RX ORDER — METHYLPREDNISOLONE 4 MG/1
4 TABLET ORAL
Status: COMPLETED | OUTPATIENT
Start: 2022-02-17 | End: 2022-02-17

## 2022-02-16 RX ORDER — SCOLOPAMINE TRANSDERMAL SYSTEM 1 MG/1
1 PATCH, EXTENDED RELEASE TRANSDERMAL
Status: DISCONTINUED | OUTPATIENT
Start: 2022-02-16 | End: 2022-02-23 | Stop reason: HOSPADM

## 2022-02-16 RX ORDER — DIAZEPAM 5 MG/ML
5 INJECTION, SOLUTION INTRAMUSCULAR; INTRAVENOUS EVERY 8 HOURS PRN
Status: DISCONTINUED | OUTPATIENT
Start: 2022-02-16 | End: 2022-02-23 | Stop reason: HOSPADM

## 2022-02-16 RX ORDER — METHYLPREDNISOLONE 4 MG/1
4 TABLET ORAL 2 TIMES DAILY WITH MEALS
Status: COMPLETED | OUTPATIENT
Start: 2022-02-20 | End: 2022-02-20

## 2022-02-16 RX ORDER — METHYLPREDNISOLONE 8 MG/1
24 TABLET ORAL ONCE
Status: COMPLETED | OUTPATIENT
Start: 2022-02-16 | End: 2022-02-16

## 2022-02-16 RX ORDER — METHYLPREDNISOLONE 4 MG/1
4 TABLET ORAL
Status: COMPLETED | OUTPATIENT
Start: 2022-02-18 | End: 2022-02-18

## 2022-02-16 RX ORDER — METHYLPREDNISOLONE 4 MG/1
4 TABLET ORAL
Status: COMPLETED | OUTPATIENT
Start: 2022-02-19 | End: 2022-02-19

## 2022-02-16 RX ORDER — METHYLPREDNISOLONE 4 MG/1
4 TABLET ORAL
Status: COMPLETED | OUTPATIENT
Start: 2022-02-21 | End: 2022-02-21

## 2022-02-16 RX ADMIN — SODIUM CHLORIDE, PRESERVATIVE FREE 2 ML: 5 INJECTION INTRAVENOUS at 21:08

## 2022-02-16 RX ADMIN — HYDRALAZINE HYDROCHLORIDE 25 MG: 50 TABLET, FILM COATED ORAL at 08:34

## 2022-02-16 RX ADMIN — DIAZEPAM 5 MG: 5 INJECTION, SOLUTION INTRAMUSCULAR; INTRAVENOUS at 17:44

## 2022-02-16 RX ADMIN — ATORVASTATIN CALCIUM 10 MG: 10 TABLET, FILM COATED ORAL at 21:08

## 2022-02-16 RX ADMIN — MECLIZINE HYDROCHLORIDE 25 MG: 25 TABLET ORAL at 08:34

## 2022-02-16 RX ADMIN — METOPROLOL SUCCINATE 50 MG: 100 TABLET, EXTENDED RELEASE ORAL at 06:29

## 2022-02-16 RX ADMIN — SODIUM CHLORIDE, PRESERVATIVE FREE 2 ML: 5 INJECTION INTRAVENOUS at 08:36

## 2022-02-16 RX ADMIN — SCOLOPAMINE TRANSDERMAL SYSTEM 1 PATCH: 1 PATCH, EXTENDED RELEASE TRANSDERMAL at 13:25

## 2022-02-16 RX ADMIN — METHYLPREDNISOLONE 24 MG: 8 TABLET ORAL at 13:20

## 2022-02-16 ASSESSMENT — PAIN SCALES - GENERAL: PAINLEVEL_OUTOF10: 0

## 2022-02-17 ENCOUNTER — APPOINTMENT (OUTPATIENT)
Dept: MRI IMAGING | Age: 67
DRG: 156 | End: 2022-02-17
Attending: PSYCHIATRY & NEUROLOGY

## 2022-02-17 PROBLEM — D72.829 LEUKOCYTOSIS: Status: ACTIVE | Noted: 2022-02-17

## 2022-02-17 LAB
ANION GAP SERPL CALC-SCNC: 9 MMOL/L (ref 10–20)
BUN SERPL-MCNC: 19 MG/DL (ref 6–20)
BUN/CREAT SERPL: 20 (ref 7–25)
CALCIUM SERPL-MCNC: 9.9 MG/DL (ref 8.4–10.2)
CHLORIDE SERPL-SCNC: 109 MMOL/L (ref 98–107)
CO2 SERPL-SCNC: 23 MMOL/L (ref 21–32)
CREAT SERPL-MCNC: 0.94 MG/DL (ref 0.51–0.95)
DEPRECATED RDW RBC: 47.7 FL (ref 39–50)
ERYTHROCYTE [DISTWIDTH] IN BLOOD: 14.5 % (ref 11–15)
FASTING DURATION TIME PATIENT: ABNORMAL H
GFR SERPLBLD BASED ON 1.73 SQ M-ARVRAT: 73 ML/MIN
GLUCOSE SERPL-MCNC: 124 MG/DL (ref 70–99)
HCT VFR BLD CALC: 46.2 % (ref 36–46.5)
HGB BLD-MCNC: 14.5 G/DL (ref 12–15.5)
MCH RBC QN AUTO: 28.3 PG (ref 26–34)
MCHC RBC AUTO-ENTMCNC: 31.4 G/DL (ref 32–36.5)
MCV RBC AUTO: 90.1 FL (ref 78–100)
NRBC BLD MANUAL-RTO: 0 /100 WBC
PLATELET # BLD AUTO: 305 K/MCL (ref 140–450)
POTASSIUM SERPL-SCNC: 4.3 MMOL/L (ref 3.4–5.1)
RBC # BLD: 5.13 MIL/MCL (ref 4–5.2)
SODIUM SERPL-SCNC: 137 MMOL/L (ref 135–145)
WBC # BLD: 13 K/MCL (ref 4.2–11)

## 2022-02-17 PROCEDURE — 97535 SELF CARE MNGMENT TRAINING: CPT

## 2022-02-17 PROCEDURE — 97162 PT EVAL MOD COMPLEX 30 MIN: CPT

## 2022-02-17 PROCEDURE — 36415 COLL VENOUS BLD VENIPUNCTURE: CPT | Performed by: FAMILY MEDICINE

## 2022-02-17 PROCEDURE — 70553 MRI BRAIN STEM W/O & W/DYE: CPT

## 2022-02-17 PROCEDURE — 80048 BASIC METABOLIC PNL TOTAL CA: CPT | Performed by: FAMILY MEDICINE

## 2022-02-17 PROCEDURE — A9585 GADOBUTROL INJECTION: HCPCS | Performed by: PSYCHIATRY & NEUROLOGY

## 2022-02-17 PROCEDURE — 99233 SBSQ HOSP IP/OBS HIGH 50: CPT | Performed by: INTERNAL MEDICINE

## 2022-02-17 PROCEDURE — G1004 CDSM NDSC: HCPCS

## 2022-02-17 PROCEDURE — 10002803 HB RX 637: Performed by: INTERNAL MEDICINE

## 2022-02-17 PROCEDURE — 10004651 HB RX, NO CHARGE ITEM: Performed by: INTERNAL MEDICINE

## 2022-02-17 PROCEDURE — 97110 THERAPEUTIC EXERCISES: CPT

## 2022-02-17 PROCEDURE — 10002803 HB RX 637: Performed by: PSYCHIATRY & NEUROLOGY

## 2022-02-17 PROCEDURE — 10002803 HB RX 637: Performed by: FAMILY MEDICINE

## 2022-02-17 PROCEDURE — 10003585 HB ROOM CHARGE INTERMEDIATE CARE

## 2022-02-17 PROCEDURE — 10002805 HB CONTRAST AGENT: Performed by: PSYCHIATRY & NEUROLOGY

## 2022-02-17 PROCEDURE — 10002800 HB RX 250 W HCPCS: Performed by: PSYCHIATRY & NEUROLOGY

## 2022-02-17 PROCEDURE — 97166 OT EVAL MOD COMPLEX 45 MIN: CPT

## 2022-02-17 PROCEDURE — 85027 COMPLETE CBC AUTOMATED: CPT | Performed by: FAMILY MEDICINE

## 2022-02-17 RX ORDER — GADOBUTROL 604.72 MG/ML
7.5 INJECTION INTRAVENOUS ONCE
Status: COMPLETED | OUTPATIENT
Start: 2022-02-17 | End: 2022-02-17

## 2022-02-17 RX ORDER — DIPHENHYDRAMINE HYDROCHLORIDE 50 MG/ML
25 INJECTION INTRAMUSCULAR; INTRAVENOUS ONCE
Status: COMPLETED | OUTPATIENT
Start: 2022-02-17 | End: 2022-02-17

## 2022-02-17 RX ORDER — HYDRALAZINE HYDROCHLORIDE 20 MG/ML
10 INJECTION INTRAMUSCULAR; INTRAVENOUS EVERY 4 HOURS PRN
Status: DISCONTINUED | OUTPATIENT
Start: 2022-02-17 | End: 2022-02-23 | Stop reason: HOSPADM

## 2022-02-17 RX ORDER — AMLODIPINE BESYLATE 5 MG/1
5 TABLET ORAL ONCE
Status: COMPLETED | OUTPATIENT
Start: 2022-02-17 | End: 2022-02-17

## 2022-02-17 RX ORDER — CLONAZEPAM 0.5 MG/1
0.5 TABLET ORAL 2 TIMES DAILY
Status: DISCONTINUED | OUTPATIENT
Start: 2022-02-17 | End: 2022-02-18

## 2022-02-17 RX ORDER — AMLODIPINE BESYLATE 5 MG/1
5 TABLET ORAL DAILY
Status: DISCONTINUED | OUTPATIENT
Start: 2022-02-17 | End: 2022-02-17

## 2022-02-17 RX ORDER — AMLODIPINE BESYLATE 5 MG/1
10 TABLET ORAL DAILY
Status: DISCONTINUED | OUTPATIENT
Start: 2022-02-18 | End: 2022-02-23 | Stop reason: HOSPADM

## 2022-02-17 RX ORDER — METOCLOPRAMIDE HYDROCHLORIDE 5 MG/ML
10 INJECTION INTRAMUSCULAR; INTRAVENOUS ONCE
Status: COMPLETED | OUTPATIENT
Start: 2022-02-17 | End: 2022-02-17

## 2022-02-17 RX ADMIN — CLONAZEPAM 0.5 MG: 0.5 TABLET ORAL at 11:50

## 2022-02-17 RX ADMIN — METHYLPREDNISOLONE 4 MG: 4 TABLET ORAL at 20:20

## 2022-02-17 RX ADMIN — METHYLPREDNISOLONE 4 MG: 4 TABLET ORAL at 18:10

## 2022-02-17 RX ADMIN — METHYLPREDNISOLONE 4 MG: 4 TABLET ORAL at 13:15

## 2022-02-17 RX ADMIN — METHYLPREDNISOLONE 4 MG: 4 TABLET ORAL at 08:52

## 2022-02-17 RX ADMIN — CLONAZEPAM 0.5 MG: 0.5 TABLET ORAL at 22:50

## 2022-02-17 RX ADMIN — AMLODIPINE BESYLATE 5 MG: 5 TABLET ORAL at 11:50

## 2022-02-17 RX ADMIN — METHYLPREDNISOLONE 4 MG: 4 TABLET ORAL at 06:22

## 2022-02-17 RX ADMIN — ATORVASTATIN CALCIUM 10 MG: 10 TABLET, FILM COATED ORAL at 20:20

## 2022-02-17 RX ADMIN — ACETAMINOPHEN 650 MG: 325 TABLET ORAL at 08:56

## 2022-02-17 RX ADMIN — HYDRALAZINE HYDROCHLORIDE 25 MG: 50 TABLET, FILM COATED ORAL at 08:52

## 2022-02-17 RX ADMIN — GADOBUTROL 7.5 ML: 604.72 INJECTION INTRAVENOUS at 11:14

## 2022-02-17 RX ADMIN — SODIUM CHLORIDE, PRESERVATIVE FREE 2 ML: 5 INJECTION INTRAVENOUS at 20:21

## 2022-02-17 RX ADMIN — DIPHENHYDRAMINE HYDROCHLORIDE 25 MG: 50 INJECTION INTRAMUSCULAR; INTRAVENOUS at 13:16

## 2022-02-17 RX ADMIN — METOCLOPRAMIDE HYDROCHLORIDE 10 MG: 5 INJECTION INTRAMUSCULAR; INTRAVENOUS at 13:16

## 2022-02-17 RX ADMIN — METOPROLOL SUCCINATE 50 MG: 100 TABLET, EXTENDED RELEASE ORAL at 06:22

## 2022-02-17 RX ADMIN — AMLODIPINE BESYLATE 5 MG: 5 TABLET ORAL at 08:52

## 2022-02-17 RX ADMIN — SODIUM CHLORIDE, PRESERVATIVE FREE 2 ML: 5 INJECTION INTRAVENOUS at 09:01

## 2022-02-17 ASSESSMENT — PAIN SCALES - GENERAL
PAINLEVEL_OUTOF10: 5
PAINLEVEL_OUTOF10: 0

## 2022-02-17 ASSESSMENT — PAIN SCALES - PAIN ASSESSMENT IN ADVANCED DEMENTIA (PAINAD): BREATHING: NORMAL

## 2022-02-17 ASSESSMENT — COGNITIVE AND FUNCTIONAL STATUS - GENERAL
BASIC_MOBILITY_CONVERTED_SCORE: 42.48
DAILY_ACTIVITY_RAW_SCORE: 17
HELP NEEDED DRESSING REGULAR LOWER BODY CLOTHING: A LITTLE
HELP NEEDED DRESSING REGULAR UPPER BODY CLOTHING: A LITTLE
HELP NEEDED FOR PERSONAL GROOMING: A LITTLE
HELP NEEDED FOR TOILETING: A LOT
DAILY_ACTIVITY_CONVERTED_SCORE: 37.26
APPLIED_COGNITIVE_CONVERTED_SCORE: 62.21
BASIC_MOBILITY_RAW_SCORE: 19
APPLIED_COGNITIVE_RAW_SCORE: 24
HELP NEEDED FOR BATHING: A LITTLE

## 2022-02-17 ASSESSMENT — ACTIVITIES OF DAILY LIVING (ADL)
HOME_MANAGEMENT_TIME_ENTRY: 15
PRIOR_ADL: INDEPENDENT

## 2022-02-17 ASSESSMENT — ENCOUNTER SYMPTOMS: PAIN SEVERITY NOW: 0

## 2022-02-18 LAB
ANION GAP SERPL CALC-SCNC: 11 MMOL/L (ref 10–20)
BASOPHILS # BLD: 0 K/MCL (ref 0–0.3)
BASOPHILS NFR BLD: 0 %
BUN SERPL-MCNC: 20 MG/DL (ref 6–20)
BUN/CREAT SERPL: 22 (ref 7–25)
CALCIUM SERPL-MCNC: 9.5 MG/DL (ref 8.4–10.2)
CHLORIDE SERPL-SCNC: 106 MMOL/L (ref 98–107)
CO2 SERPL-SCNC: 25 MMOL/L (ref 21–32)
CREAT SERPL-MCNC: 0.91 MG/DL (ref 0.51–0.95)
DEPRECATED RDW RBC: 48 FL (ref 39–50)
EOSINOPHIL # BLD: 0 K/MCL (ref 0–0.5)
EOSINOPHIL NFR BLD: 0 %
ERYTHROCYTE [DISTWIDTH] IN BLOOD: 14.7 % (ref 11–15)
FASTING DURATION TIME PATIENT: ABNORMAL H
GFR SERPLBLD BASED ON 1.73 SQ M-ARVRAT: 76 ML/MIN
GLUCOSE SERPL-MCNC: 131 MG/DL (ref 70–99)
HCT VFR BLD CALC: 45.1 % (ref 36–46.5)
HGB BLD-MCNC: 14.4 G/DL (ref 12–15.5)
IMM GRANULOCYTES # BLD AUTO: 0.1 K/MCL (ref 0–0.2)
IMM GRANULOCYTES # BLD: 1 %
LYMPHOCYTES # BLD: 2 K/MCL (ref 1–4)
LYMPHOCYTES NFR BLD: 16 %
MCH RBC QN AUTO: 28.6 PG (ref 26–34)
MCHC RBC AUTO-ENTMCNC: 31.9 G/DL (ref 32–36.5)
MCV RBC AUTO: 89.5 FL (ref 78–100)
MONOCYTES # BLD: 0.7 K/MCL (ref 0.3–0.9)
MONOCYTES NFR BLD: 5 %
NEUTROPHILS # BLD: 9.9 K/MCL (ref 1.8–7.7)
NEUTROPHILS NFR BLD: 78 %
NRBC BLD MANUAL-RTO: 0 /100 WBC
PLATELET # BLD AUTO: 303 K/MCL (ref 140–450)
POTASSIUM SERPL-SCNC: 4.1 MMOL/L (ref 3.4–5.1)
RBC # BLD: 5.04 MIL/MCL (ref 4–5.2)
SODIUM SERPL-SCNC: 138 MMOL/L (ref 135–145)
WBC # BLD: 12.7 K/MCL (ref 4.2–11)

## 2022-02-18 PROCEDURE — 80048 BASIC METABOLIC PNL TOTAL CA: CPT | Performed by: INTERNAL MEDICINE

## 2022-02-18 PROCEDURE — 10003585 HB ROOM CHARGE INTERMEDIATE CARE

## 2022-02-18 PROCEDURE — 36415 COLL VENOUS BLD VENIPUNCTURE: CPT | Performed by: INTERNAL MEDICINE

## 2022-02-18 PROCEDURE — 10002803 HB RX 637: Performed by: PSYCHIATRY & NEUROLOGY

## 2022-02-18 PROCEDURE — 10004651 HB RX, NO CHARGE ITEM: Performed by: INTERNAL MEDICINE

## 2022-02-18 PROCEDURE — 99232 SBSQ HOSP IP/OBS MODERATE 35: CPT | Performed by: INTERNAL MEDICINE

## 2022-02-18 PROCEDURE — 10002803 HB RX 637: Performed by: INTERNAL MEDICINE

## 2022-02-18 PROCEDURE — 85025 COMPLETE CBC W/AUTO DIFF WBC: CPT | Performed by: INTERNAL MEDICINE

## 2022-02-18 RX ORDER — CLONAZEPAM 0.5 MG/1
0.5 TABLET ORAL 2 TIMES DAILY
Status: COMPLETED | OUTPATIENT
Start: 2022-02-18 | End: 2022-02-19

## 2022-02-18 RX ADMIN — SODIUM CHLORIDE, PRESERVATIVE FREE 2 ML: 5 INJECTION INTRAVENOUS at 09:02

## 2022-02-18 RX ADMIN — METHYLPREDNISOLONE 4 MG: 4 TABLET ORAL at 20:53

## 2022-02-18 RX ADMIN — METOPROLOL SUCCINATE 50 MG: 100 TABLET, EXTENDED RELEASE ORAL at 06:14

## 2022-02-18 RX ADMIN — METHYLPREDNISOLONE 4 MG: 4 TABLET ORAL at 09:01

## 2022-02-18 RX ADMIN — SODIUM CHLORIDE, PRESERVATIVE FREE 2 ML: 5 INJECTION INTRAVENOUS at 20:54

## 2022-02-18 RX ADMIN — METHYLPREDNISOLONE 4 MG: 4 TABLET ORAL at 17:30

## 2022-02-18 RX ADMIN — CLONAZEPAM 0.5 MG: 0.5 TABLET ORAL at 20:53

## 2022-02-18 RX ADMIN — METHYLPREDNISOLONE 4 MG: 4 TABLET ORAL at 13:13

## 2022-02-18 RX ADMIN — ATORVASTATIN CALCIUM 10 MG: 10 TABLET, FILM COATED ORAL at 20:53

## 2022-02-18 RX ADMIN — CLONAZEPAM 0.5 MG: 0.5 TABLET ORAL at 09:01

## 2022-02-18 RX ADMIN — AMLODIPINE BESYLATE 10 MG: 5 TABLET ORAL at 09:01

## 2022-02-18 ASSESSMENT — PAIN SCALES - GENERAL
PAINLEVEL_OUTOF10: 0
PAINLEVEL_OUTOF10: 0

## 2022-02-18 ASSESSMENT — PAIN SCALES - PAIN ASSESSMENT IN ADVANCED DEMENTIA (PAINAD): BREATHING: NORMAL

## 2022-02-19 PROBLEM — R53.81 DEBILITY: Status: ACTIVE | Noted: 2022-02-19

## 2022-02-19 PROBLEM — I16.0 HYPERTENSIVE URGENCY: Status: ACTIVE | Noted: 2022-02-19

## 2022-02-19 PROCEDURE — 10002800 HB RX 250 W HCPCS: Performed by: INTERNAL MEDICINE

## 2022-02-19 PROCEDURE — 10004651 HB RX, NO CHARGE ITEM: Performed by: INTERNAL MEDICINE

## 2022-02-19 PROCEDURE — 10003585 HB ROOM CHARGE INTERMEDIATE CARE

## 2022-02-19 PROCEDURE — 10002803 HB RX 637: Performed by: PSYCHIATRY & NEUROLOGY

## 2022-02-19 PROCEDURE — 10002803 HB RX 637: Performed by: INTERNAL MEDICINE

## 2022-02-19 PROCEDURE — 99233 SBSQ HOSP IP/OBS HIGH 50: CPT | Performed by: INTERNAL MEDICINE

## 2022-02-19 RX ORDER — ENOXAPARIN SODIUM 100 MG/ML
40 INJECTION SUBCUTANEOUS EVERY 24 HOURS
Status: DISCONTINUED | OUTPATIENT
Start: 2022-02-19 | End: 2022-02-23 | Stop reason: HOSPADM

## 2022-02-19 RX ADMIN — SODIUM CHLORIDE, PRESERVATIVE FREE 2 ML: 5 INJECTION INTRAVENOUS at 08:34

## 2022-02-19 RX ADMIN — METOPROLOL SUCCINATE 50 MG: 100 TABLET, EXTENDED RELEASE ORAL at 06:28

## 2022-02-19 RX ADMIN — SCOLOPAMINE TRANSDERMAL SYSTEM 1 PATCH: 1 PATCH, EXTENDED RELEASE TRANSDERMAL at 08:28

## 2022-02-19 RX ADMIN — METHYLPREDNISOLONE 4 MG: 4 TABLET ORAL at 17:24

## 2022-02-19 RX ADMIN — METHYLPREDNISOLONE 4 MG: 4 TABLET ORAL at 12:21

## 2022-02-19 RX ADMIN — CLONAZEPAM 0.5 MG: 0.5 TABLET ORAL at 08:25

## 2022-02-19 RX ADMIN — ATORVASTATIN CALCIUM 10 MG: 10 TABLET, FILM COATED ORAL at 20:42

## 2022-02-19 RX ADMIN — ENOXAPARIN SODIUM 40 MG: 40 INJECTION SUBCUTANEOUS at 17:24

## 2022-02-19 RX ADMIN — SODIUM CHLORIDE, PRESERVATIVE FREE 2 ML: 5 INJECTION INTRAVENOUS at 20:43

## 2022-02-19 RX ADMIN — AMLODIPINE BESYLATE 10 MG: 5 TABLET ORAL at 08:25

## 2022-02-19 RX ADMIN — METHYLPREDNISOLONE 4 MG: 4 TABLET ORAL at 08:25

## 2022-02-19 ASSESSMENT — PAIN SCALES - PAIN ASSESSMENT IN ADVANCED DEMENTIA (PAINAD): BREATHING: NORMAL

## 2022-02-19 ASSESSMENT — PAIN SCALES - GENERAL
PAINLEVEL_OUTOF10: 0
PAINLEVEL_OUTOF10: 0

## 2022-02-20 PROCEDURE — 10002803 HB RX 637: Performed by: PSYCHIATRY & NEUROLOGY

## 2022-02-20 PROCEDURE — 10002800 HB RX 250 W HCPCS: Performed by: INTERNAL MEDICINE

## 2022-02-20 PROCEDURE — 10004651 HB RX, NO CHARGE ITEM: Performed by: INTERNAL MEDICINE

## 2022-02-20 PROCEDURE — 99233 SBSQ HOSP IP/OBS HIGH 50: CPT | Performed by: INTERNAL MEDICINE

## 2022-02-20 PROCEDURE — 10003585 HB ROOM CHARGE INTERMEDIATE CARE

## 2022-02-20 PROCEDURE — 10002803 HB RX 637: Performed by: INTERNAL MEDICINE

## 2022-02-20 RX ADMIN — AMLODIPINE BESYLATE 10 MG: 5 TABLET ORAL at 08:24

## 2022-02-20 RX ADMIN — SODIUM CHLORIDE, PRESERVATIVE FREE 2 ML: 5 INJECTION INTRAVENOUS at 21:27

## 2022-02-20 RX ADMIN — ATORVASTATIN CALCIUM 10 MG: 10 TABLET, FILM COATED ORAL at 21:25

## 2022-02-20 RX ADMIN — METHYLPREDNISOLONE 4 MG: 4 TABLET ORAL at 17:50

## 2022-02-20 RX ADMIN — METHYLPREDNISOLONE 4 MG: 4 TABLET ORAL at 08:24

## 2022-02-20 RX ADMIN — METOPROLOL SUCCINATE 50 MG: 100 TABLET, EXTENDED RELEASE ORAL at 06:22

## 2022-02-20 RX ADMIN — ENOXAPARIN SODIUM 40 MG: 40 INJECTION SUBCUTANEOUS at 17:50

## 2022-02-20 RX ADMIN — SODIUM CHLORIDE, PRESERVATIVE FREE 2 ML: 5 INJECTION INTRAVENOUS at 08:26

## 2022-02-20 ASSESSMENT — PAIN SCALES - GENERAL
PAINLEVEL_OUTOF10: 0
PAINLEVEL_OUTOF10: 0

## 2022-02-21 PROCEDURE — 10002803 HB RX 637: Performed by: PSYCHIATRY & NEUROLOGY

## 2022-02-21 PROCEDURE — 10002800 HB RX 250 W HCPCS: Performed by: INTERNAL MEDICINE

## 2022-02-21 PROCEDURE — 99233 SBSQ HOSP IP/OBS HIGH 50: CPT | Performed by: INTERNAL MEDICINE

## 2022-02-21 PROCEDURE — 10002803 HB RX 637: Performed by: INTERNAL MEDICINE

## 2022-02-21 PROCEDURE — 97535 SELF CARE MNGMENT TRAINING: CPT

## 2022-02-21 PROCEDURE — 10003585 HB ROOM CHARGE INTERMEDIATE CARE

## 2022-02-21 PROCEDURE — 10004651 HB RX, NO CHARGE ITEM: Performed by: INTERNAL MEDICINE

## 2022-02-21 RX ADMIN — METOPROLOL SUCCINATE 50 MG: 100 TABLET, EXTENDED RELEASE ORAL at 06:54

## 2022-02-21 RX ADMIN — SODIUM CHLORIDE, PRESERVATIVE FREE 2 ML: 5 INJECTION INTRAVENOUS at 20:56

## 2022-02-21 RX ADMIN — AMLODIPINE BESYLATE 10 MG: 5 TABLET ORAL at 10:14

## 2022-02-21 RX ADMIN — SODIUM CHLORIDE, PRESERVATIVE FREE 2 ML: 5 INJECTION INTRAVENOUS at 10:15

## 2022-02-21 RX ADMIN — METHYLPREDNISOLONE 4 MG: 4 TABLET ORAL at 10:14

## 2022-02-21 RX ADMIN — ATORVASTATIN CALCIUM 10 MG: 10 TABLET, FILM COATED ORAL at 20:55

## 2022-02-21 RX ADMIN — ENOXAPARIN SODIUM 40 MG: 40 INJECTION SUBCUTANEOUS at 18:41

## 2022-02-21 ASSESSMENT — COGNITIVE AND FUNCTIONAL STATUS - GENERAL
APPLIED_COGNITIVE_CONVERTED_SCORE: 62.21
HELP NEEDED FOR BATHING: A LITTLE
APPLIED_COGNITIVE_RAW_SCORE: 24
DAILY_ACTIVITY_CONVERTED_SCORE: 51.12
DAILY_ACTIVITY_RAW_SCORE: 23

## 2022-02-21 ASSESSMENT — PAIN SCALES - WONG BAKER
WONGBAKER_NUMERICALRESPONSE: 0
WONGBAKER_NUMERICALRESPONSE: 0

## 2022-02-21 ASSESSMENT — PAIN SCALES - GENERAL
PAINLEVEL_OUTOF10: 0

## 2022-02-21 ASSESSMENT — PAIN SCALES - PAIN ASSESSMENT IN ADVANCED DEMENTIA (PAINAD): BREATHING: NORMAL

## 2022-02-21 ASSESSMENT — ENCOUNTER SYMPTOMS: PAIN SEVERITY NOW: 0

## 2022-02-21 ASSESSMENT — ACTIVITIES OF DAILY LIVING (ADL): HOME_MANAGEMENT_TIME_ENTRY: 23

## 2022-02-22 ENCOUNTER — APPOINTMENT (OUTPATIENT)
Dept: CT IMAGING | Age: 67
DRG: 156 | End: 2022-02-22
Attending: INTERNAL MEDICINE

## 2022-02-22 ENCOUNTER — APPOINTMENT (OUTPATIENT)
Dept: CARDIOLOGY | Age: 67
DRG: 156 | End: 2022-02-22
Attending: INTERNAL MEDICINE

## 2022-02-22 PROBLEM — I16.0 HYPERTENSIVE URGENCY: Status: RESOLVED | Noted: 2022-02-19 | Resolved: 2022-02-22

## 2022-02-22 PROCEDURE — 76376 3D RENDER W/INTRP POSTPROCES: CPT

## 2022-02-22 PROCEDURE — 10002803 HB RX 637: Performed by: PSYCHIATRY & NEUROLOGY

## 2022-02-22 PROCEDURE — G1004 CDSM NDSC: HCPCS

## 2022-02-22 PROCEDURE — 97535 SELF CARE MNGMENT TRAINING: CPT

## 2022-02-22 PROCEDURE — 71250 CT THORAX DX C-: CPT

## 2022-02-22 PROCEDURE — 10004651 HB RX, NO CHARGE ITEM: Performed by: INTERNAL MEDICINE

## 2022-02-22 PROCEDURE — 97530 THERAPEUTIC ACTIVITIES: CPT

## 2022-02-22 PROCEDURE — 99232 SBSQ HOSP IP/OBS MODERATE 35: CPT | Performed by: INTERNAL MEDICINE

## 2022-02-22 PROCEDURE — 10002800 HB RX 250 W HCPCS: Performed by: INTERNAL MEDICINE

## 2022-02-22 PROCEDURE — 76376 3D RENDER W/INTRP POSTPROCES: CPT | Performed by: INTERNAL MEDICINE

## 2022-02-22 PROCEDURE — 10003585 HB ROOM CHARGE INTERMEDIATE CARE

## 2022-02-22 PROCEDURE — 97116 GAIT TRAINING THERAPY: CPT

## 2022-02-22 PROCEDURE — 93306 TTE W/DOPPLER COMPLETE: CPT | Performed by: INTERNAL MEDICINE

## 2022-02-22 PROCEDURE — 10002803 HB RX 637: Performed by: INTERNAL MEDICINE

## 2022-02-22 RX ORDER — SCOLOPAMINE TRANSDERMAL SYSTEM 1 MG/1
1 PATCH, EXTENDED RELEASE TRANSDERMAL
Qty: 4 PATCH | Refills: 0 | Status: SHIPPED | OUTPATIENT
Start: 2022-02-25

## 2022-02-22 RX ORDER — ACETAMINOPHEN 325 MG/1
650 TABLET ORAL EVERY 4 HOURS PRN
Status: SHIPPED | COMMUNITY
Start: 2022-02-22

## 2022-02-22 RX ORDER — AMLODIPINE BESYLATE 10 MG/1
10 TABLET ORAL DAILY
Qty: 30 TABLET | Refills: 3 | Status: SHIPPED | OUTPATIENT
Start: 2022-02-23

## 2022-02-22 RX ADMIN — SODIUM CHLORIDE, PRESERVATIVE FREE 2 ML: 5 INJECTION INTRAVENOUS at 20:00

## 2022-02-22 RX ADMIN — AMLODIPINE BESYLATE 10 MG: 5 TABLET ORAL at 08:32

## 2022-02-22 RX ADMIN — ATORVASTATIN CALCIUM 10 MG: 10 TABLET, FILM COATED ORAL at 20:00

## 2022-02-22 RX ADMIN — ENOXAPARIN SODIUM 40 MG: 40 INJECTION SUBCUTANEOUS at 18:27

## 2022-02-22 RX ADMIN — SODIUM CHLORIDE, PRESERVATIVE FREE 2 ML: 5 INJECTION INTRAVENOUS at 08:33

## 2022-02-22 RX ADMIN — SCOLOPAMINE TRANSDERMAL SYSTEM 1 PATCH: 1 PATCH, EXTENDED RELEASE TRANSDERMAL at 08:36

## 2022-02-22 RX ADMIN — METOPROLOL SUCCINATE 50 MG: 100 TABLET, EXTENDED RELEASE ORAL at 08:32

## 2022-02-22 ASSESSMENT — COGNITIVE AND FUNCTIONAL STATUS - GENERAL
DAILY_ACTIVITY_RAW_SCORE: 23
DAILY_ACTIVITY_CONVERTED_SCORE: 51.12
APPLIED_COGNITIVE_CONVERTED_SCORE: 62.21
APPLIED_COGNITIVE_RAW_SCORE: 24
BASIC_MOBILITY_RAW_SCORE: 23
HELP NEEDED FOR BATHING: A LITTLE
BASIC_MOBILITY_CONVERTED_SCORE: 50.88

## 2022-02-22 ASSESSMENT — ENCOUNTER SYMPTOMS: PAIN SEVERITY NOW: 0

## 2022-02-22 ASSESSMENT — PAIN SCALES - PAIN ASSESSMENT IN ADVANCED DEMENTIA (PAINAD): BREATHING: NORMAL

## 2022-02-22 ASSESSMENT — ACTIVITIES OF DAILY LIVING (ADL): HOME_MANAGEMENT_TIME_ENTRY: 23

## 2022-02-22 ASSESSMENT — PAIN SCALES - GENERAL
PAINLEVEL_OUTOF10: 0

## 2022-02-22 ASSESSMENT — PAIN SCALES - WONG BAKER
WONGBAKER_NUMERICALRESPONSE: 0
WONGBAKER_NUMERICALRESPONSE: 0

## 2022-02-23 VITALS
HEART RATE: 72 BPM | RESPIRATION RATE: 18 BRPM | OXYGEN SATURATION: 99 % | HEIGHT: 65 IN | SYSTOLIC BLOOD PRESSURE: 144 MMHG | BODY MASS INDEX: 24.54 KG/M2 | WEIGHT: 147.27 LBS | DIASTOLIC BLOOD PRESSURE: 82 MMHG | TEMPERATURE: 98.1 F

## 2022-02-23 PROCEDURE — 10002803 HB RX 637: Performed by: PSYCHIATRY & NEUROLOGY

## 2022-02-23 PROCEDURE — 99238 HOSP IP/OBS DSCHRG MGMT 30/<: CPT | Performed by: INTERNAL MEDICINE

## 2022-02-23 PROCEDURE — 10002803 HB RX 637: Performed by: INTERNAL MEDICINE

## 2022-02-23 RX ADMIN — METOPROLOL SUCCINATE 50 MG: 100 TABLET, EXTENDED RELEASE ORAL at 07:01

## 2022-02-23 RX ADMIN — AMLODIPINE BESYLATE 10 MG: 5 TABLET ORAL at 08:05

## 2022-02-23 ASSESSMENT — PAIN SCALES - PAIN ASSESSMENT IN ADVANCED DEMENTIA (PAINAD): BREATHING: NORMAL

## 2022-02-23 ASSESSMENT — PAIN SCALES - GENERAL: PAINLEVEL_OUTOF10: 0

## 2022-02-23 ASSESSMENT — PAIN SCALES - WONG BAKER: WONGBAKER_NUMERICALRESPONSE: 0

## 2022-03-01 ENCOUNTER — TELEPHONE (OUTPATIENT)
Dept: FAMILY MEDICINE CLINIC | Facility: CLINIC | Age: 67
End: 2022-03-01

## 2022-03-01 ENCOUNTER — MED REC SCAN ONLY (OUTPATIENT)
Dept: FAMILY MEDICINE CLINIC | Facility: CLINIC | Age: 67
End: 2022-03-01

## 2022-03-03 ENCOUNTER — PATIENT MESSAGE (OUTPATIENT)
Dept: FAMILY MEDICINE CLINIC | Facility: CLINIC | Age: 67
End: 2022-03-03

## 2022-03-03 NOTE — TELEPHONE ENCOUNTER
From: Pro Pandey  To: Lindsey Canas MD  Sent: 3/3/2022 4:53 PM CST  Subject: Other    Good Evening    I am recovering well from my hospitalization in [facility] 2-14 through 2-23. I would lime to schedule my future stress test but not sure through who.  May I get some assistance please

## 2022-03-10 ENCOUNTER — OFFICE VISIT (OUTPATIENT)
Dept: FAMILY MEDICINE CLINIC | Facility: CLINIC | Age: 67
End: 2022-03-10
Payer: COMMERCIAL

## 2022-03-10 VITALS
TEMPERATURE: 98 F | HEIGHT: 64 IN | BODY MASS INDEX: 24.86 KG/M2 | WEIGHT: 145.63 LBS | HEART RATE: 71 BPM | OXYGEN SATURATION: 98 % | DIASTOLIC BLOOD PRESSURE: 60 MMHG | RESPIRATION RATE: 18 BRPM | SYSTOLIC BLOOD PRESSURE: 130 MMHG

## 2022-03-10 DIAGNOSIS — M54.32 SCIATICA OF LEFT SIDE: ICD-10-CM

## 2022-03-10 DIAGNOSIS — R91.8 PULMONARY NODULES/LESIONS, MULTIPLE: ICD-10-CM

## 2022-03-10 DIAGNOSIS — Z12.31 BREAST CANCER SCREENING BY MAMMOGRAM: ICD-10-CM

## 2022-03-10 DIAGNOSIS — M85.859 OSTEOPENIA OF NECK OF FEMUR, UNSPECIFIED LATERALITY: ICD-10-CM

## 2022-03-10 DIAGNOSIS — R42 VERTIGO: ICD-10-CM

## 2022-03-10 DIAGNOSIS — E78.2 MIXED HYPERLIPIDEMIA: ICD-10-CM

## 2022-03-10 DIAGNOSIS — R73.03 PREDIABETES: ICD-10-CM

## 2022-03-10 DIAGNOSIS — I10 BENIGN ESSENTIAL HYPERTENSION: ICD-10-CM

## 2022-03-10 DIAGNOSIS — M54.16 LUMBAR RADICULOPATHY: ICD-10-CM

## 2022-03-10 DIAGNOSIS — Z00.00 ANNUAL PHYSICAL EXAM: Primary | ICD-10-CM

## 2022-03-10 PROBLEM — M54.59 INTRACTABLE LOW BACK PAIN: Status: RESOLVED | Noted: 2020-03-23 | Resolved: 2022-03-10

## 2022-03-10 PROBLEM — R53.81 DEBILITY: Status: RESOLVED | Noted: 2022-02-19 | Resolved: 2022-03-10

## 2022-03-10 PROBLEM — E78.5 DYSLIPIDEMIA: Status: RESOLVED | Noted: 2022-02-14 | Resolved: 2022-03-10

## 2022-03-10 PROBLEM — D72.829 LEUKOCYTOSIS: Status: RESOLVED | Noted: 2022-02-17 | Resolved: 2022-03-10

## 2022-03-10 PROBLEM — R53.81 DEBILITY: Status: ACTIVE | Noted: 2022-02-19

## 2022-03-10 PROBLEM — D72.829 LEUKOCYTOSIS: Status: ACTIVE | Noted: 2022-02-17

## 2022-03-10 PROBLEM — E78.5 DYSLIPIDEMIA: Status: ACTIVE | Noted: 2022-02-14

## 2022-03-10 PROBLEM — R29.898 WEAKNESS OF LEFT LOWER EXTREMITY: Status: RESOLVED | Noted: 2020-03-23 | Resolved: 2022-03-10

## 2022-03-10 PROCEDURE — 3008F BODY MASS INDEX DOCD: CPT | Performed by: FAMILY MEDICINE

## 2022-03-10 PROCEDURE — 96160 PT-FOCUSED HLTH RISK ASSMT: CPT | Performed by: FAMILY MEDICINE

## 2022-03-10 PROCEDURE — 99397 PER PM REEVAL EST PAT 65+ YR: CPT | Performed by: FAMILY MEDICINE

## 2022-03-10 PROCEDURE — 3078F DIAST BP <80 MM HG: CPT | Performed by: FAMILY MEDICINE

## 2022-03-10 PROCEDURE — 3075F SYST BP GE 130 - 139MM HG: CPT | Performed by: FAMILY MEDICINE

## 2022-03-10 PROCEDURE — G0439 PPPS, SUBSEQ VISIT: HCPCS | Performed by: FAMILY MEDICINE

## 2022-03-10 RX ORDER — SCOLOPAMINE TRANSDERMAL SYSTEM 1 MG/1
1 PATCH, EXTENDED RELEASE TRANSDERMAL
COMMUNITY
Start: 2022-02-25 | End: 2022-03-10 | Stop reason: ALTCHOICE

## 2022-03-10 RX ORDER — ACETAMINOPHEN 325 MG/1
2 TABLET ORAL EVERY 4 HOURS PRN
COMMUNITY
Start: 2022-02-22

## 2022-03-10 RX ORDER — PSYLLIUM HUSK 0.4 G
1 CAPSULE ORAL DAILY
COMMUNITY
Start: 2022-03-04

## 2022-03-10 RX ORDER — ACETAMINOPHEN 325 MG/1
325 TABLET ORAL EVERY 6 HOURS PRN
Qty: 100 TABLET | Refills: 2 | Status: SHIPPED | OUTPATIENT
Start: 2022-03-10

## 2022-03-10 RX ORDER — AMLODIPINE BESYLATE 10 MG/1
10 TABLET ORAL DAILY
COMMUNITY
Start: 2022-02-23

## 2022-03-10 RX ORDER — AMLODIPINE BESYLATE 10 MG/1
TABLET ORAL
COMMUNITY
Start: 2022-02-22 | End: 2022-03-10

## 2022-03-10 RX ORDER — SCOLOPAMINE TRANSDERMAL SYSTEM 1 MG/1
1 PATCH, EXTENDED RELEASE TRANSDERMAL
Qty: 4 PATCH | Refills: 1 | Status: SHIPPED | OUTPATIENT
Start: 2022-03-10

## 2022-03-14 ENCOUNTER — TELEPHONE (OUTPATIENT)
Dept: PHYSICAL THERAPY | Facility: HOSPITAL | Age: 67
End: 2022-03-14

## 2022-03-16 ENCOUNTER — OFFICE VISIT (OUTPATIENT)
Dept: PHYSICAL THERAPY | Facility: HOSPITAL | Age: 67
End: 2022-03-16
Attending: FAMILY MEDICINE
Payer: MEDICARE

## 2022-03-16 DIAGNOSIS — R42 VERTIGO: ICD-10-CM

## 2022-03-16 PROCEDURE — 97112 NEUROMUSCULAR REEDUCATION: CPT

## 2022-03-16 PROCEDURE — 97162 PT EVAL MOD COMPLEX 30 MIN: CPT

## 2022-03-21 ENCOUNTER — OFFICE VISIT (OUTPATIENT)
Dept: PHYSICAL THERAPY | Facility: HOSPITAL | Age: 67
End: 2022-03-21
Attending: FAMILY MEDICINE
Payer: MEDICARE

## 2022-03-21 PROCEDURE — 97112 NEUROMUSCULAR REEDUCATION: CPT

## 2022-03-21 NOTE — PROGRESS NOTES
Dx: Vertigo         Insurance (Authorized # of Visits):  8 requested        Authorizing Physician: Dr. Isabelle Herrera MD visit: none scheduled  Fall Risk: standard         Precautions: n/a             Subjective: Patient states that she has been doing better overall this week, feels she is getting slightly better each day. She has not had any dizziness since last seen, primarily LOB or feeling off when she moves too quickly. If she stands for too long, she feels that she has to lean on something to rest, which is not her typical. She went to the mall with her daughter last week and it went well, but did take several seated rest breaks when her daughter would go into the stores. She plans on going back to work light duty relatively soon, going to call her work today; does not currently have concern about being able to do this. Felt that her HEP was getting easy and ready to be progressed. Pain: N/A; patient not coming for pain related diagnosis      Objective:   Good carryover HEP with min VC/modelling for proper performance (3/21/22)    NPC: 3.5\", marked improvement from eval (3/21/22)      Assessment: Patient overall tolerated tx session very well, ability to participate without extended or repeated rest breaks with marked improvement from eval. She was able to perform all tasks as listed below with 2 brief rest breaks in session. She was able to switch between tasks primarily focused on balance, tasks focused on vestibulo-ocular, and tasks that were a combination of the two without reporting increase in dizziness greater than 2 levels up. Convergence with significant improvement to 3.5\". Intermittent LOB with tasks with HT and movements slowed compared to her reported normal, especially with movement in multiple planes. Updated HEP as listed below. Goals: (To be met in 8 visits)  1. Patient will report 0/10 dizziness x at least 1 week with performance of typical ADLs/IADLs.    2. Patient will improve convergence to 2.5\" or less without double vision. 3. Patient will demonstrate ability to perform typical postural changes including rolling, supine<>sit, and bending over without dizziness. 4. Patient will score at least 26/30 on the FGA to be considered at decrease risk for falls with dynamic community level gait. 5. Patient will be IND and compliant in HEP to maintain progress made in PT. - issued and progressed     Plan: Continue PT with progression as indicated. Date: 3/21/2022  TX#: 2/8 Date:                 TX#: 3/ Date:                 TX#: 4/ Date:                 TX#: 5/ Date: Tx#: 6/   NR  - TM walking with busy visual 1. 5mph x 5 min  - tandem walking x 2 laps // bars, progressed to on airex beam x 4  - visual motion with pt fixation, trunk twists with ball x 10 R/L; repeated up/down x 10 ea; mod speed at best  - walking past mirror, watch until cant see anymore, look fwd x 5 R/L   - pencil push-ups with busy background x 10, progressed to holding with walking, 50ft x 4  - romberg on airex with R/L HT x 10, mod tandem on airex with R/L HT x 7 ea back  - p/u items as called at random and bring back to central location x 8  - diagonals into/out of cabinet x 10 ea       -       -       -       HEP: R/L HT to targets on compliant surface, up/down HT to targets on compliant surface, convergence with walking, tandem walking, walking program    Charges: NR x 3       Total Timed Treatment: 50 min  Total Treatment Time: 50 min

## 2022-03-22 ENCOUNTER — MED REC SCAN ONLY (OUTPATIENT)
Dept: FAMILY MEDICINE CLINIC | Facility: CLINIC | Age: 67
End: 2022-03-22

## 2022-03-28 ENCOUNTER — OFFICE VISIT (OUTPATIENT)
Dept: PHYSICAL THERAPY | Facility: HOSPITAL | Age: 67
End: 2022-03-28
Attending: FAMILY MEDICINE
Payer: MEDICARE

## 2022-03-28 PROCEDURE — 97112 NEUROMUSCULAR REEDUCATION: CPT

## 2022-03-28 NOTE — PROGRESS NOTES
Dx: Vertigo         Insurance (Authorized # of Visits):  8 requested        Authorizing Physician: Dr. Serena Quiroz  Next MD visit: none scheduled  Fall Risk: standard         Precautions: n/a             Subjective: Patient states that she has continued to improve overall this past week. She has not yet gone back to work, but her dizziness/imbalance episodes have continued to diminish. She has noticed that she is most effected by standing for too long, may start to lose balance. HEP is going well, getting easier without any new issue, did not have any flare-up of symptoms after last session. Pain: N/A; patient not coming for pain related diagnosis      Objective:   Good carryover HEP with min VC/modelling for proper performance (3/21/22)    NPC: 3.5\", marked improvement from eval (3/21/22)    Dynamic gait: (3/28/22)  Walking with R/L HT: initial mild slowed gait, but improved with reps, no significant LOB  Walking with catch, dual task: initial significant slowed gait, improved to typical speed with reps  Walking with responding to activities at random including HT to all directions, backward walking, change in gait direction, and forward bending: mild LOB initial with fwd bending and changing direction       Assessment: Progression of difficulty of gait task this date with good ability to respond and perform. Focus of today's session on dynamic gait including walking with HT, walking with dual task catch, and walking with responding to activities as called at random. Patient was able to perform all without notable LOB, intermittent min stepping strategy to recover, but no other external support needed, no increase in dizziness. Patient did report mild symptoms with repetitive changing direction as with collecting items around the room. Also improved ability to tolerate wide spaces, overlooking complex visual with balcony. Goals: (To be met in 8 visits)  1.  Patient will report 0/10 dizziness x at least 1 week with performance of typical ADLs/IADLs. - progress  2. Patient will improve convergence to 2.5\" or less without double vision. - progress  3. Patient will demonstrate ability to perform typical postural changes including rolling, supine<>sit, and bending over without dizziness. - progress  4. Patient will score at least 26/30 on the FGA to be considered at decrease risk for falls with dynamic community level gait. - progress  5. Patient will be IND and compliant in HEP to maintain progress made in PT. - issued and progressed     Plan: Continue PT with progression as indicated. Date: 3/21/2022  TX#: 2/8 Date: 3/28/22               TX#: 3/8 Date:                 TX#: 4/ Date:                 TX#: 5/ Date: Tx#: 6/   NR  - TM walking with busy visual 1. 5mph x 5 min  - tandem walking x 2 laps // bars, progressed to on airex beam x 4  - visual motion with pt fixation, trunk twists with ball x 10 R/L; repeated up/down x 10 ea; mod speed at best  - walking past mirror, watch until cant see anymore, look fwd x 5 R/L   - pencil push-ups with busy background x 10, progressed to holding with walking, 50ft x 4  - romberg on airex with R/L HT x 10, mod tandem on airex with R/L HT x 7 ea back  - p/u items as called at random and bring back to central location x 8  - diagonals into/out of cabinet x 10 ea NR  - TM walking with busy visual 1. 5mph x 5 min  - tandem walking on airex beam, no UEs x 4  - tandem walking level with trunk twist x 2 laps // bars   - walking with catch, fwd, 50ft x 4  - walking with convergence, divergence with busy background, 50ft x 4  - p/u items as called at random and bring back to central location x 6, 2 sets   - walking with R/L HT, 50ft x 4  - walking with tasks called at random including HT to all directions, changing direction, bending over, backwards walking x 250ft  - walking towards and looking over wide space, balcony x 2, no UEs  - walking past long balcony, looking through and down x 2      - -      - -      - -      HEP: R/L HT to targets on compliant surface, up/down HT to targets on compliant surface, convergence with walking, tandem walking, walking program    Charges: NR x 3       Total Timed Treatment: 45 min  Total Treatment Time: 45 min

## 2022-04-01 RX ORDER — METOPROLOL SUCCINATE 50 MG/1
TABLET, EXTENDED RELEASE ORAL
Qty: 90 TABLET | Refills: 0 | OUTPATIENT
Start: 2022-04-01

## 2022-04-04 ENCOUNTER — OFFICE VISIT (OUTPATIENT)
Dept: PHYSICAL THERAPY | Facility: HOSPITAL | Age: 67
End: 2022-04-04
Attending: FAMILY MEDICINE
Payer: MEDICARE

## 2022-04-04 PROCEDURE — 97112 NEUROMUSCULAR REEDUCATION: CPT

## 2022-04-08 ENCOUNTER — OFFICE VISIT (OUTPATIENT)
Dept: FAMILY MEDICINE CLINIC | Facility: CLINIC | Age: 67
End: 2022-04-08
Payer: COMMERCIAL

## 2022-04-08 VITALS
BODY MASS INDEX: 25 KG/M2 | SYSTOLIC BLOOD PRESSURE: 132 MMHG | RESPIRATION RATE: 14 BRPM | DIASTOLIC BLOOD PRESSURE: 68 MMHG | WEIGHT: 147.38 LBS | OXYGEN SATURATION: 98 % | HEART RATE: 77 BPM | TEMPERATURE: 98 F

## 2022-04-08 DIAGNOSIS — E78.2 MIXED HYPERLIPIDEMIA: ICD-10-CM

## 2022-04-08 DIAGNOSIS — R42 VERTIGO: Primary | ICD-10-CM

## 2022-04-08 DIAGNOSIS — H61.21 IMPACTED CERUMEN OF RIGHT EAR: ICD-10-CM

## 2022-04-08 DIAGNOSIS — I10 ESSENTIAL HYPERTENSION, BENIGN: ICD-10-CM

## 2022-04-08 DIAGNOSIS — R73.03 PREDIABETES: ICD-10-CM

## 2022-04-08 LAB
ALBUMIN/GLOBULIN RATIO: 1.5 (CALC) (ref 1–2.5)
ALBUMIN: 4.3 G/DL (ref 3.6–5.1)
ALKALINE PHOSPHATASE: 85 U/L (ref 37–153)
ALT: 16 U/L (ref 6–29)
AST: 18 U/L (ref 10–35)
BILIRUBIN, TOTAL: 0.4 MG/DL (ref 0.2–1.2)
BUN: 11 MG/DL (ref 7–25)
CALCIUM: 9.9 MG/DL (ref 8.6–10.4)
CARBON DIOXIDE: 27 MMOL/L (ref 20–32)
CHLORIDE: 107 MMOL/L (ref 98–110)
CHOL/HDLC RATIO: 2.7 (CALC)
CHOLESTEROL, TOTAL: 174 MG/DL
CREATININE: 0.86 MG/DL (ref 0.5–0.99)
EGFR IF AFRICN AM: 82 ML/MIN/1.73M2
EGFR IF NONAFRICN AM: 70 ML/MIN/1.73M2
GLOBULIN: 2.8 G/DL (CALC) (ref 1.9–3.7)
GLUCOSE: 98 MG/DL (ref 65–99)
HDL CHOLESTEROL: 64 MG/DL
HEMOGLOBIN A1C: 6 % OF TOTAL HGB
LDL-CHOLESTEROL: 92 MG/DL (CALC)
NON-HDL CHOLESTEROL: 110 MG/DL (CALC)
POTASSIUM: 4.2 MMOL/L (ref 3.5–5.3)
PROTEIN, TOTAL: 7.1 G/DL (ref 6.1–8.1)
SODIUM: 141 MMOL/L (ref 135–146)
TRIGLYCERIDES: 85 MG/DL
TSH W/REFLEX TO FT4: 1.54 MIU/L (ref 0.4–4.5)

## 2022-04-08 PROCEDURE — 99214 OFFICE O/P EST MOD 30 MIN: CPT | Performed by: FAMILY MEDICINE

## 2022-04-08 PROCEDURE — 3078F DIAST BP <80 MM HG: CPT | Performed by: FAMILY MEDICINE

## 2022-04-08 PROCEDURE — 3075F SYST BP GE 130 - 139MM HG: CPT | Performed by: FAMILY MEDICINE

## 2022-04-22 ENCOUNTER — APPOINTMENT (OUTPATIENT)
Dept: PHYSICAL THERAPY | Facility: HOSPITAL | Age: 67
End: 2022-04-22
Attending: FAMILY MEDICINE
Payer: MEDICARE

## 2022-04-29 ENCOUNTER — APPOINTMENT (OUTPATIENT)
Dept: PHYSICAL THERAPY | Facility: HOSPITAL | Age: 67
End: 2022-04-29
Attending: FAMILY MEDICINE
Payer: MEDICARE

## 2022-05-06 ENCOUNTER — APPOINTMENT (OUTPATIENT)
Dept: PHYSICAL THERAPY | Facility: HOSPITAL | Age: 67
End: 2022-05-06
Attending: FAMILY MEDICINE
Payer: MEDICARE

## 2022-05-10 ENCOUNTER — TELEPHONE (OUTPATIENT)
Dept: FAMILY MEDICINE CLINIC | Facility: CLINIC | Age: 67
End: 2022-05-10

## 2022-05-10 NOTE — TELEPHONE ENCOUNTER
LMOM for patient regarding NO SHOW status for office visit on 05/10/2022 with Dr. Dionte Smith. I informed patient our office has a $42.44 NO SHOW FEE POLICY so we ask that you call 24 hours before your appt time. You can also use my-chart to cancel appt before 48 hours before your appointment. Patient will be charged $40.00 for any future NO SHOW appointments.

## 2022-05-13 ENCOUNTER — APPOINTMENT (OUTPATIENT)
Dept: PHYSICAL THERAPY | Facility: HOSPITAL | Age: 67
End: 2022-05-13
Attending: FAMILY MEDICINE
Payer: MEDICARE

## 2022-05-20 ENCOUNTER — APPOINTMENT (OUTPATIENT)
Dept: PHYSICAL THERAPY | Facility: HOSPITAL | Age: 67
End: 2022-05-20
Attending: FAMILY MEDICINE
Payer: MEDICARE

## 2022-05-27 ENCOUNTER — APPOINTMENT (OUTPATIENT)
Dept: PHYSICAL THERAPY | Facility: HOSPITAL | Age: 67
End: 2022-05-27
Attending: FAMILY MEDICINE
Payer: MEDICARE

## 2022-06-03 ENCOUNTER — APPOINTMENT (OUTPATIENT)
Dept: PHYSICAL THERAPY | Facility: HOSPITAL | Age: 67
End: 2022-06-03
Attending: FAMILY MEDICINE
Payer: MEDICARE

## 2022-06-23 ENCOUNTER — OFFICE VISIT (OUTPATIENT)
Dept: FAMILY MEDICINE CLINIC | Facility: CLINIC | Age: 67
End: 2022-06-23
Payer: COMMERCIAL

## 2022-06-23 VITALS
BODY MASS INDEX: 24.41 KG/M2 | OXYGEN SATURATION: 98 % | HEIGHT: 64 IN | HEART RATE: 68 BPM | DIASTOLIC BLOOD PRESSURE: 74 MMHG | SYSTOLIC BLOOD PRESSURE: 124 MMHG | WEIGHT: 143 LBS

## 2022-06-23 DIAGNOSIS — M25.512 CHRONIC LEFT SHOULDER PAIN: ICD-10-CM

## 2022-06-23 DIAGNOSIS — E78.2 MIXED HYPERLIPIDEMIA: Primary | ICD-10-CM

## 2022-06-23 DIAGNOSIS — R42 VERTIGO: ICD-10-CM

## 2022-06-23 DIAGNOSIS — I10 ESSENTIAL HYPERTENSION, BENIGN: ICD-10-CM

## 2022-06-23 DIAGNOSIS — G89.29 CHRONIC LEFT SHOULDER PAIN: ICD-10-CM

## 2022-06-23 DIAGNOSIS — R73.03 PREDIABETES: ICD-10-CM

## 2022-06-23 PROCEDURE — 99214 OFFICE O/P EST MOD 30 MIN: CPT | Performed by: FAMILY MEDICINE

## 2022-06-23 PROCEDURE — 3078F DIAST BP <80 MM HG: CPT | Performed by: FAMILY MEDICINE

## 2022-06-23 PROCEDURE — 3008F BODY MASS INDEX DOCD: CPT | Performed by: FAMILY MEDICINE

## 2022-06-23 PROCEDURE — 3074F SYST BP LT 130 MM HG: CPT | Performed by: FAMILY MEDICINE

## 2022-06-23 RX ORDER — METOPROLOL SUCCINATE 50 MG/1
50 TABLET, EXTENDED RELEASE ORAL DAILY
Qty: 90 TABLET | Refills: 1 | Status: SHIPPED | OUTPATIENT
Start: 2022-06-23

## 2022-06-23 RX ORDER — CYCLOBENZAPRINE HCL 10 MG
10 TABLET ORAL NIGHTLY PRN
Qty: 30 TABLET | Refills: 0 | Status: SHIPPED | OUTPATIENT
Start: 2022-06-23

## 2022-06-23 RX ORDER — AMLODIPINE BESYLATE 10 MG/1
10 TABLET ORAL DAILY
Qty: 90 TABLET | Refills: 1 | Status: SHIPPED | OUTPATIENT
Start: 2022-06-23

## 2022-06-23 RX ORDER — ATORVASTATIN CALCIUM 10 MG/1
10 TABLET, FILM COATED ORAL NIGHTLY
Qty: 90 TABLET | Refills: 1 | Status: SHIPPED | OUTPATIENT
Start: 2022-06-23

## 2022-06-27 RX ORDER — AMLODIPINE BESYLATE 10 MG/1
10 TABLET ORAL DAILY
Qty: 30 TABLET | Refills: 3 | OUTPATIENT
Start: 2022-06-27

## 2022-08-22 ENCOUNTER — LAB ENCOUNTER (OUTPATIENT)
Dept: LAB | Age: 67
End: 2022-08-22
Attending: FAMILY MEDICINE
Payer: MEDICARE

## 2022-08-22 DIAGNOSIS — Z01.812 ENCOUNTER FOR PREOPERATIVE SCREENING LABORATORY TESTING FOR COVID-19 VIRUS: ICD-10-CM

## 2022-08-22 DIAGNOSIS — Z20.822 ENCOUNTER FOR PREOPERATIVE SCREENING LABORATORY TESTING FOR COVID-19 VIRUS: ICD-10-CM

## 2022-08-23 LAB — SARS-COV-2 RNA RESP QL NAA+PROBE: NOT DETECTED

## 2022-08-25 ENCOUNTER — HOSPITAL ENCOUNTER (OUTPATIENT)
Dept: CV DIAGNOSTICS | Facility: HOSPITAL | Age: 67
Discharge: HOME | End: 2022-08-25
Attending: FAMILY MEDICINE
Payer: MEDICARE

## 2022-08-25 DIAGNOSIS — R07.9 CHEST PAIN, UNSPECIFIED TYPE: ICD-10-CM

## 2022-08-25 PROCEDURE — 93018 CV STRESS TEST I&R ONLY: CPT | Performed by: FAMILY MEDICINE

## 2022-08-25 PROCEDURE — 93017 CV STRESS TEST TRACING ONLY: CPT | Performed by: FAMILY MEDICINE

## 2022-09-09 ENCOUNTER — HOSPITAL ENCOUNTER (OUTPATIENT)
Dept: CT IMAGING | Facility: HOSPITAL | Age: 67
Discharge: HOME | End: 2022-09-09
Attending: FAMILY MEDICINE
Payer: MEDICARE

## 2022-09-09 ENCOUNTER — HOSPITAL ENCOUNTER (OUTPATIENT)
Dept: MAMMOGRAPHY | Facility: HOSPITAL | Age: 67
Discharge: HOME | End: 2022-09-09
Attending: FAMILY MEDICINE
Payer: MEDICARE

## 2022-09-09 DIAGNOSIS — R91.8 PULMONARY NODULES/LESIONS, MULTIPLE: ICD-10-CM

## 2022-09-09 DIAGNOSIS — Z12.31 BREAST CANCER SCREENING BY MAMMOGRAM: ICD-10-CM

## 2022-09-09 PROCEDURE — 77067 SCR MAMMO BI INCL CAD: CPT | Performed by: FAMILY MEDICINE

## 2022-09-09 PROCEDURE — 77063 BREAST TOMOSYNTHESIS BI: CPT | Performed by: FAMILY MEDICINE

## 2022-09-09 PROCEDURE — 71250 CT THORAX DX C-: CPT | Performed by: FAMILY MEDICINE

## 2022-09-12 ENCOUNTER — OFFICE VISIT (OUTPATIENT)
Dept: FAMILY MEDICINE CLINIC | Facility: CLINIC | Age: 67
End: 2022-09-12
Payer: COMMERCIAL

## 2022-09-12 VITALS
BODY MASS INDEX: 24 KG/M2 | RESPIRATION RATE: 16 BRPM | DIASTOLIC BLOOD PRESSURE: 68 MMHG | TEMPERATURE: 97 F | HEART RATE: 61 BPM | OXYGEN SATURATION: 98 % | SYSTOLIC BLOOD PRESSURE: 132 MMHG | WEIGHT: 142.19 LBS

## 2022-09-12 DIAGNOSIS — R73.03 PREDIABETES: ICD-10-CM

## 2022-09-12 DIAGNOSIS — I10 ESSENTIAL HYPERTENSION, BENIGN: ICD-10-CM

## 2022-09-12 DIAGNOSIS — E78.2 MIXED HYPERLIPIDEMIA: ICD-10-CM

## 2022-09-12 DIAGNOSIS — R91.8 PULMONARY NODULES/LESIONS, MULTIPLE: Primary | ICD-10-CM

## 2022-09-12 PROCEDURE — 3075F SYST BP GE 130 - 139MM HG: CPT | Performed by: FAMILY MEDICINE

## 2022-09-12 PROCEDURE — 3078F DIAST BP <80 MM HG: CPT | Performed by: FAMILY MEDICINE

## 2022-09-12 PROCEDURE — 1126F AMNT PAIN NOTED NONE PRSNT: CPT | Performed by: FAMILY MEDICINE

## 2022-09-12 PROCEDURE — 99214 OFFICE O/P EST MOD 30 MIN: CPT | Performed by: FAMILY MEDICINE

## 2022-09-12 RX ORDER — ATORVASTATIN CALCIUM 10 MG/1
10 TABLET, FILM COATED ORAL NIGHTLY
Qty: 90 TABLET | Refills: 1 | Status: SHIPPED | OUTPATIENT
Start: 2022-09-12

## 2022-09-29 DIAGNOSIS — I10 ESSENTIAL HYPERTENSION, BENIGN: ICD-10-CM

## 2022-09-30 RX ORDER — AMLODIPINE BESYLATE 10 MG/1
10 TABLET ORAL DAILY
Qty: 90 TABLET | Refills: 1 | OUTPATIENT
Start: 2022-09-30

## 2022-09-30 NOTE — TELEPHONE ENCOUNTER
Called pharmacy and placed on hold for 7 mins. Calling to see if refill is on file since pt is requesting new refill. [facility] sent to pt stating that she should have 1 refill on file to reach out to pharmacy for them to refill. Explained that I tried calling but was on hold for long period of time. Amlodipine 10 mg  Filled 6-23-22  Qty 90  1 refill  Upcoming appt. 3-10-23  LOV 09-12-22  Labs: 4-7-22 CMP  RTC 6 mo.

## 2022-10-04 ENCOUNTER — TELEPHONE (OUTPATIENT)
Dept: FAMILY MEDICINE CLINIC | Facility: CLINIC | Age: 67
End: 2022-10-04

## 2022-12-16 DIAGNOSIS — I10 ESSENTIAL HYPERTENSION, BENIGN: ICD-10-CM

## 2022-12-19 RX ORDER — METOPROLOL SUCCINATE 50 MG/1
TABLET, EXTENDED RELEASE ORAL
Qty: 90 TABLET | Refills: 1 | Status: SHIPPED | OUTPATIENT
Start: 2022-12-19

## 2023-01-16 ENCOUNTER — LAB REQUISITION (OUTPATIENT)
Dept: OCCUPATIONAL MEDICINE | Age: 68
End: 2023-01-16

## 2023-01-16 DIAGNOSIS — Z00.00 ENCOUNTER FOR GENERAL ADULT MEDICAL EXAMINATION WITHOUT ABNORMAL FINDINGS: ICD-10-CM

## 2023-01-16 PROCEDURE — 86480 TB TEST CELL IMMUN MEASURE: CPT | Performed by: CLINICAL MEDICAL LABORATORY

## 2023-01-16 PROCEDURE — PSEU9049 QUANTIFERON TB PLUS: Performed by: CLINICAL MEDICAL LABORATORY

## 2023-01-18 LAB
GAMMA INTERFERON BACKGROUND BLD IA-ACNC: 0.04 IU/ML
M TB IFN-G BLD-IMP: NEGATIVE
M TB IFN-G CD4+ BCKGRND COR BLD-ACNC: 0 IU/ML
M TB IFN-G CD4+CD8+ BCKGRND COR BLD-ACNC: 0 IU/ML
MITOGEN IGNF BCKGRD COR BLD-ACNC: >10 IU/ML

## 2023-03-10 ENCOUNTER — OFFICE VISIT (OUTPATIENT)
Dept: FAMILY MEDICINE CLINIC | Facility: CLINIC | Age: 68
End: 2023-03-10
Payer: MEDICARE

## 2023-03-10 VITALS
RESPIRATION RATE: 18 BRPM | WEIGHT: 142.63 LBS | BODY MASS INDEX: 24.35 KG/M2 | SYSTOLIC BLOOD PRESSURE: 142 MMHG | HEIGHT: 64 IN | TEMPERATURE: 97 F | HEART RATE: 65 BPM | DIASTOLIC BLOOD PRESSURE: 80 MMHG | OXYGEN SATURATION: 98 %

## 2023-03-10 DIAGNOSIS — I10 ESSENTIAL HYPERTENSION, BENIGN: ICD-10-CM

## 2023-03-10 DIAGNOSIS — T75.3XXS MOTION SICKNESS, SEQUELA: ICD-10-CM

## 2023-03-10 DIAGNOSIS — Z00.00 ANNUAL PHYSICAL EXAM: Primary | ICD-10-CM

## 2023-03-10 DIAGNOSIS — Z78.9 NO HISTORY OF MEASLES, MUMPS, RUBELLA (MMR) VACCINATION: ICD-10-CM

## 2023-03-10 DIAGNOSIS — R91.8 PULMONARY NODULES/LESIONS, MULTIPLE: ICD-10-CM

## 2023-03-10 DIAGNOSIS — R73.03 PREDIABETES: ICD-10-CM

## 2023-03-10 DIAGNOSIS — E78.2 MIXED HYPERLIPIDEMIA: ICD-10-CM

## 2023-03-10 RX ORDER — SCOLOPAMINE TRANSDERMAL SYSTEM 1 MG/1
1 PATCH, EXTENDED RELEASE TRANSDERMAL
Qty: 10 PATCH | Refills: 1 | Status: SHIPPED | OUTPATIENT
Start: 2023-03-10

## 2023-03-31 ENCOUNTER — LAB ENCOUNTER (OUTPATIENT)
Dept: LAB | Age: 68
End: 2023-03-31
Attending: FAMILY MEDICINE
Payer: MEDICARE

## 2023-03-31 DIAGNOSIS — Z78.9 NO HISTORY OF MEASLES, MUMPS, RUBELLA (MMR) VACCINATION: ICD-10-CM

## 2023-03-31 DIAGNOSIS — Z78.9 NORMAL TISSUE: Primary | ICD-10-CM

## 2023-03-31 LAB
ALBUMIN SERPL-MCNC: 3.6 G/DL (ref 3.4–5)
ALBUMIN/GLOB SERPL: 0.9 {RATIO} (ref 1–2)
ALP LIVER SERPL-CCNC: 78 U/L
ALT SERPL-CCNC: 22 U/L
ANION GAP SERPL CALC-SCNC: 5 MMOL/L (ref 0–18)
AST SERPL-CCNC: 17 U/L (ref 15–37)
BILIRUB SERPL-MCNC: 0.4 MG/DL (ref 0.1–2)
BUN BLD-MCNC: 11 MG/DL (ref 7–18)
CALCIUM BLD-MCNC: 9.4 MG/DL (ref 8.5–10.1)
CHLORIDE SERPL-SCNC: 109 MMOL/L (ref 98–112)
CHOLEST SERPL-MCNC: 191 MG/DL (ref ?–200)
CO2 SERPL-SCNC: 27 MMOL/L (ref 21–32)
CREAT BLD-MCNC: 0.91 MG/DL
EST. AVERAGE GLUCOSE BLD GHB EST-MCNC: 131 MG/DL (ref 68–126)
FASTING PATIENT LIPID ANSWER: YES
FASTING STATUS PATIENT QL REPORTED: YES
GFR SERPLBLD BASED ON 1.73 SQ M-ARVRAT: 69 ML/MIN/1.73M2 (ref 60–?)
GLOBULIN PLAS-MCNC: 3.8 G/DL (ref 2.8–4.4)
GLUCOSE BLD-MCNC: 98 MG/DL (ref 70–99)
HBA1C MFR BLD: 6.2 % (ref ?–5.7)
HDLC SERPL-MCNC: 72 MG/DL (ref 40–59)
LDLC SERPL CALC-MCNC: 107 MG/DL (ref ?–100)
NONHDLC SERPL-MCNC: 119 MG/DL (ref ?–130)
OSMOLALITY SERPL CALC.SUM OF ELEC: 291 MOSM/KG (ref 275–295)
POTASSIUM SERPL-SCNC: 3.9 MMOL/L (ref 3.5–5.1)
PROT SERPL-MCNC: 7.4 G/DL (ref 6.4–8.2)
RUBV IGG SER QL: POSITIVE
RUBV IGG SER-ACNC: 421.8 IU/ML (ref 10–?)
SODIUM SERPL-SCNC: 141 MMOL/L (ref 136–145)
TRIGL SERPL-MCNC: 62 MG/DL (ref 30–149)
VLDLC SERPL CALC-MCNC: 10 MG/DL (ref 0–30)

## 2023-03-31 PROCEDURE — 80053 COMPREHEN METABOLIC PANEL: CPT | Performed by: FAMILY MEDICINE

## 2023-03-31 PROCEDURE — 86765 RUBEOLA ANTIBODY: CPT | Performed by: FAMILY MEDICINE

## 2023-03-31 PROCEDURE — 83036 HEMOGLOBIN GLYCOSYLATED A1C: CPT | Performed by: FAMILY MEDICINE

## 2023-03-31 PROCEDURE — 86735 MUMPS ANTIBODY: CPT | Performed by: FAMILY MEDICINE

## 2023-03-31 PROCEDURE — 86762 RUBELLA ANTIBODY: CPT

## 2023-03-31 PROCEDURE — 80061 LIPID PANEL: CPT | Performed by: FAMILY MEDICINE

## 2023-04-03 ENCOUNTER — PATIENT MESSAGE (OUTPATIENT)
Dept: FAMILY MEDICINE CLINIC | Facility: CLINIC | Age: 68
End: 2023-04-03

## 2023-04-03 LAB
MEV IGG SER-ACNC: >300 AU/ML (ref 16.5–?)
MUV IGG SER IA-ACNC: 48.6 AU/ML (ref 11–?)

## 2023-04-03 NOTE — TELEPHONE ENCOUNTER
From: Reyna Daniel  To: Lidna Sandoval MD  Sent: 4/3/2023 10:44 AM CDT  Subject: Other    Good Morning  [place] all is well. Just wanted to check and see when the physical form left from last appointment will be available for .    Have a blessed day  Tori

## 2023-04-04 DIAGNOSIS — R73.03 PREDIABETES: Primary | ICD-10-CM

## 2023-05-05 ENCOUNTER — OFFICE VISIT (OUTPATIENT)
Dept: FAMILY MEDICINE CLINIC | Facility: CLINIC | Age: 68
End: 2023-05-05
Payer: MEDICARE

## 2023-05-05 VITALS
SYSTOLIC BLOOD PRESSURE: 134 MMHG | OXYGEN SATURATION: 99 % | DIASTOLIC BLOOD PRESSURE: 72 MMHG | BODY MASS INDEX: 23.84 KG/M2 | TEMPERATURE: 97 F | RESPIRATION RATE: 16 BRPM | HEIGHT: 64 IN | HEART RATE: 71 BPM | WEIGHT: 139.63 LBS

## 2023-05-05 DIAGNOSIS — L84 CORN OF TOE: Primary | ICD-10-CM

## 2023-05-05 PROCEDURE — 1160F RVW MEDS BY RX/DR IN RCRD: CPT | Performed by: FAMILY MEDICINE

## 2023-05-05 PROCEDURE — 1126F AMNT PAIN NOTED NONE PRSNT: CPT | Performed by: FAMILY MEDICINE

## 2023-05-05 PROCEDURE — 3075F SYST BP GE 130 - 139MM HG: CPT | Performed by: FAMILY MEDICINE

## 2023-05-05 PROCEDURE — 1170F FXNL STATUS ASSESSED: CPT | Performed by: FAMILY MEDICINE

## 2023-05-05 PROCEDURE — 99213 OFFICE O/P EST LOW 20 MIN: CPT | Performed by: FAMILY MEDICINE

## 2023-05-05 PROCEDURE — 3008F BODY MASS INDEX DOCD: CPT | Performed by: FAMILY MEDICINE

## 2023-05-05 PROCEDURE — 1159F MED LIST DOCD IN RCRD: CPT | Performed by: FAMILY MEDICINE

## 2023-05-05 PROCEDURE — 3078F DIAST BP <80 MM HG: CPT | Performed by: FAMILY MEDICINE

## 2023-06-09 ENCOUNTER — OFFICE VISIT (OUTPATIENT)
Dept: PODIATRY CLINIC | Facility: CLINIC | Age: 68
End: 2023-06-09

## 2023-06-09 DIAGNOSIS — L84 FOOT CALLUS: Primary | ICD-10-CM

## 2023-06-09 DIAGNOSIS — M20.5X2 ADDUCTOVARUS ROTATION OF TOE, ACQUIRED, LEFT: ICD-10-CM

## 2023-06-09 DIAGNOSIS — M20.5X1 ADDUCTOVARUS ROTATION OF TOE, ACQUIRED, RIGHT: ICD-10-CM

## 2023-06-09 DIAGNOSIS — M79.675 TOE PAIN, BILATERAL: ICD-10-CM

## 2023-06-09 DIAGNOSIS — R73.03 PREDIABETES: ICD-10-CM

## 2023-06-09 DIAGNOSIS — M54.16 LUMBAR RADICULOPATHY: ICD-10-CM

## 2023-06-09 DIAGNOSIS — M79.674 TOE PAIN, BILATERAL: ICD-10-CM

## 2023-06-10 NOTE — PATIENT INSTRUCTIONS
Ambulate with supportive shoes with wide toe box. Can use urea cream to callus sites between visits. Follow-up as needed for debridement of calluses.

## 2023-06-20 ENCOUNTER — PATIENT MESSAGE (OUTPATIENT)
Dept: FAMILY MEDICINE CLINIC | Facility: CLINIC | Age: 68
End: 2023-06-20

## 2023-06-20 DIAGNOSIS — M25.512 CHRONIC LEFT SHOULDER PAIN: Primary | ICD-10-CM

## 2023-06-20 DIAGNOSIS — G89.29 CHRONIC LEFT SHOULDER PAIN: Primary | ICD-10-CM

## 2023-06-21 NOTE — TELEPHONE ENCOUNTER
From: Abigail Nuñez  To: Hai Prather MD  Sent: 6/20/2023 10:48 PM CDT  Subject: Referral Request    Hi there    I just had to cancel my scheduled appointment for my shoulder due to a last emergency which will not allow me to be there in time. My referral expires on 6/23. May I please get another referral to reschedule.  Thanks

## 2023-06-21 NOTE — TELEPHONE ENCOUNTER
From OV 6/23/22    Left arm/shoulder pain:She reports having persistent left arm and shoulder pain since early 2022, now feels the problem is worse. She describes it is a soreness and is a constant pain. She cannot sleep on that left side at night. She denies any neck pain or injury. She tried Tylenol but she gets drowsy \"it kicks my butt\" and she feels sleepy for days on this. She takes Ibuprofen which helps dull the pain. She pain at rest.  The pain occurs while at work sometimes, but is able to get through her work day. She has no numbness/tingling/weakness in her arm. At its worst, the pain is a 10/10.

## 2023-07-01 DIAGNOSIS — I10 ESSENTIAL HYPERTENSION, BENIGN: ICD-10-CM

## 2023-07-03 RX ORDER — METOPROLOL SUCCINATE 50 MG/1
TABLET, EXTENDED RELEASE ORAL
Qty: 90 TABLET | Refills: 1 | Status: SHIPPED | OUTPATIENT
Start: 2023-07-03

## 2023-07-04 ENCOUNTER — PATIENT MESSAGE (OUTPATIENT)
Dept: FAMILY MEDICINE CLINIC | Facility: CLINIC | Age: 68
End: 2023-07-04

## 2023-07-05 RX ORDER — IBUPROFEN 600 MG/1
600 TABLET ORAL EVERY 8 HOURS PRN
Qty: 30 TABLET | Refills: 0 | Status: SHIPPED | OUTPATIENT
Start: 2023-07-05 | End: 2023-07-12

## 2023-07-05 NOTE — TELEPHONE ENCOUNTER
From: Raymond Qureshi  To: Karan Pineda MD  Sent: 7/4/2023 6:46 AM CDT  Subject: Referral Request    Good Morning    [place] all is well. I was trying to renew a prescription I last received over a year. I missed the refill deadline of 05-03-23. If possible may I get a refill.    Ibuprofen 800mg or it can be reduced to 600mg  Thanks

## 2023-07-05 NOTE — TELEPHONE ENCOUNTER
Refill no protocol available:     Pt requesting refill of   Requested Prescriptions     Pending Prescriptions Disp Refills    ibuprofen 600 MG Oral Tab 30 tablet 0     Sig: Take 1 tablet (600 mg total) by mouth every 8 (eight) hours as needed for Pain or Fever.        Sent to Provider for review:    Last Time Medication was Filled:  3/8/2020    Last Office Visit with Provider: 5/5/2023    Appt scheduled on 9/13/2023

## 2023-07-10 DIAGNOSIS — I10 ESSENTIAL HYPERTENSION, BENIGN: ICD-10-CM

## 2023-07-11 ENCOUNTER — PATIENT MESSAGE (OUTPATIENT)
Dept: FAMILY MEDICINE CLINIC | Facility: CLINIC | Age: 68
End: 2023-07-11

## 2023-07-11 RX ORDER — METOPROLOL SUCCINATE 50 MG/1
50 TABLET, EXTENDED RELEASE ORAL DAILY
Qty: 90 TABLET | Refills: 1 | OUTPATIENT
Start: 2023-07-11

## 2023-07-11 NOTE — TELEPHONE ENCOUNTER
Refill Passed Protocol:     Pt requesting refill of   Requested Prescriptions     Pending Prescriptions Disp Refills    metoprolol succinate ER 50 MG Oral Tablet 24 Hr 90 tablet 1     Sig: Take 1 tablet (50 mg total) by mouth daily.      Refill was approved and sent to pharmacy:     Last Time Medication was Filled:  7/3/2023    Last Office Visit with Provider: 5/5/2023    Appt. scheduled on 9/13/2023

## 2023-07-11 NOTE — TELEPHONE ENCOUNTER
From: Abigail Nuñez  To: Hai Prather MD  Sent: 7/11/2023 4:13 PM CDT  Subject: Other    Hi Dr Vineet Hidalgo    I was denied a refill for my Metropolol Was there a reason why.   I'm out .   Have a great day

## 2023-07-21 NOTE — TELEPHONE ENCOUNTER
Please see my chart message. Pt is asking for renewal of order for shoulder Xray. Order pended for provider review and signature.

## 2023-07-25 ENCOUNTER — HOSPITAL ENCOUNTER (OUTPATIENT)
Dept: GENERAL RADIOLOGY | Age: 68
Discharge: HOME | End: 2023-07-25
Attending: FAMILY MEDICINE
Payer: MEDICARE

## 2023-07-25 DIAGNOSIS — G89.29 CHRONIC LEFT SHOULDER PAIN: ICD-10-CM

## 2023-07-25 DIAGNOSIS — M25.512 CHRONIC LEFT SHOULDER PAIN: ICD-10-CM

## 2023-07-25 PROCEDURE — 73030 X-RAY EXAM OF SHOULDER: CPT | Performed by: FAMILY MEDICINE

## 2023-08-02 ENCOUNTER — PATIENT MESSAGE (OUTPATIENT)
Dept: FAMILY MEDICINE CLINIC | Facility: CLINIC | Age: 68
End: 2023-08-02

## 2023-08-02 DIAGNOSIS — T75.3XXS MOTION SICKNESS, SEQUELA: ICD-10-CM

## 2023-08-03 RX ORDER — SCOLOPAMINE TRANSDERMAL SYSTEM 1 MG/1
1 PATCH, EXTENDED RELEASE TRANSDERMAL
Qty: 10 PATCH | Refills: 1 | Status: SHIPPED | OUTPATIENT
Start: 2023-08-03

## 2023-08-03 NOTE — TELEPHONE ENCOUNTER
From: Pro Pandey  To: Lindsey Canas MD  Sent: 8/2/2023 6:00 PM CDT  Subject: Other    Good day Dr. Nancy Mack to bother you. The scopolamine 1mg prescribed for emergencies, I have not been able to purchase it due to large co pay. [facility] did inform me With a prescription sent to them from my physician, I can receive it through them. Because upfront co payment is not demanded. Payment Arrangements can be made. Will you be able to make this happen. I will be making some long trips and It's only at that time I would like to make sure I'm prepared with a prescription with me if need occurs.  Thank God to date I've been very well with no episodes

## 2023-08-17 DIAGNOSIS — E78.2 MIXED HYPERLIPIDEMIA: ICD-10-CM

## 2023-08-17 RX ORDER — ATORVASTATIN CALCIUM 10 MG/1
10 TABLET, FILM COATED ORAL NIGHTLY
Qty: 90 TABLET | Refills: 1 | Status: SHIPPED | OUTPATIENT
Start: 2023-08-17

## 2023-08-17 NOTE — TELEPHONE ENCOUNTER
Refill Passed Protocol:     Pt requesting refill of   Requested Prescriptions     Pending Prescriptions Disp Refills    ATORVASTATIN 10 MG Oral Tab [Pharmacy Med Name: ATORVASTATIN 10MG TABLETS] 90 tablet 1     Sig: TAKE 1 TABLET BY MOUTH AT NIGHT     Refill was approved and sent to pharmacy:     Last Time Medication was Filled:  9/12/2022    Last Office Visit with Provider: 5/5/2023    Appt. scheduled on 9/13/2023

## 2023-09-13 ENCOUNTER — OFFICE VISIT (OUTPATIENT)
Dept: FAMILY MEDICINE CLINIC | Facility: CLINIC | Age: 68
End: 2023-09-13
Payer: MEDICARE

## 2023-09-13 VITALS
OXYGEN SATURATION: 97 % | WEIGHT: 139.63 LBS | HEIGHT: 64 IN | BODY MASS INDEX: 23.84 KG/M2 | SYSTOLIC BLOOD PRESSURE: 136 MMHG | TEMPERATURE: 98 F | RESPIRATION RATE: 20 BRPM | HEART RATE: 65 BPM | DIASTOLIC BLOOD PRESSURE: 78 MMHG

## 2023-09-13 DIAGNOSIS — E78.2 MIXED HYPERLIPIDEMIA: ICD-10-CM

## 2023-09-13 DIAGNOSIS — I10 ESSENTIAL HYPERTENSION, BENIGN: ICD-10-CM

## 2023-09-13 DIAGNOSIS — R73.03 PREDIABETES: Primary | ICD-10-CM

## 2023-09-13 DIAGNOSIS — Z12.31 ENCOUNTER FOR SCREENING MAMMOGRAM FOR MALIGNANT NEOPLASM OF BREAST: ICD-10-CM

## 2023-09-13 PROCEDURE — 1160F RVW MEDS BY RX/DR IN RCRD: CPT | Performed by: FAMILY MEDICINE

## 2023-09-13 PROCEDURE — 3078F DIAST BP <80 MM HG: CPT | Performed by: FAMILY MEDICINE

## 2023-09-13 PROCEDURE — 3008F BODY MASS INDEX DOCD: CPT | Performed by: FAMILY MEDICINE

## 2023-09-13 PROCEDURE — 1170F FXNL STATUS ASSESSED: CPT | Performed by: FAMILY MEDICINE

## 2023-09-13 PROCEDURE — 99214 OFFICE O/P EST MOD 30 MIN: CPT | Performed by: FAMILY MEDICINE

## 2023-09-13 PROCEDURE — 1126F AMNT PAIN NOTED NONE PRSNT: CPT | Performed by: FAMILY MEDICINE

## 2023-09-13 PROCEDURE — 1159F MED LIST DOCD IN RCRD: CPT | Performed by: FAMILY MEDICINE

## 2023-09-13 PROCEDURE — 3075F SYST BP GE 130 - 139MM HG: CPT | Performed by: FAMILY MEDICINE

## 2023-09-13 RX ORDER — METFORMIN HYDROCHLORIDE 500 MG/1
500 TABLET, EXTENDED RELEASE ORAL
Qty: 90 TABLET | Refills: 1 | Status: SHIPPED | OUTPATIENT
Start: 2023-09-13

## 2023-10-02 ENCOUNTER — TELEPHONE (OUTPATIENT)
Dept: FAMILY MEDICINE CLINIC | Facility: CLINIC | Age: 68
End: 2023-10-02

## 2023-11-13 ENCOUNTER — PATIENT MESSAGE (OUTPATIENT)
Dept: FAMILY MEDICINE CLINIC | Facility: CLINIC | Age: 68
End: 2023-11-13

## 2023-11-15 ENCOUNTER — PATIENT MESSAGE (OUTPATIENT)
Dept: FAMILY MEDICINE CLINIC | Facility: CLINIC | Age: 68
End: 2023-11-15

## 2023-11-15 ENCOUNTER — LAB ENCOUNTER (OUTPATIENT)
Dept: LAB | Age: 68
End: 2023-11-15
Attending: FAMILY MEDICINE
Payer: MEDICARE

## 2023-11-15 DIAGNOSIS — I10 ESSENTIAL HYPERTENSION, BENIGN: ICD-10-CM

## 2023-11-15 DIAGNOSIS — R73.03 PREDIABETES: ICD-10-CM

## 2023-11-15 DIAGNOSIS — E78.2 MIXED HYPERLIPIDEMIA: Primary | ICD-10-CM

## 2023-11-15 LAB
ALBUMIN SERPL-MCNC: 3.5 G/DL (ref 3.4–5)
ALBUMIN/GLOB SERPL: 0.9 {RATIO} (ref 1–2)
ALP LIVER SERPL-CCNC: 78 U/L
ALT SERPL-CCNC: 26 U/L
ANION GAP SERPL CALC-SCNC: 5 MMOL/L (ref 0–18)
AST SERPL-CCNC: 15 U/L (ref 15–37)
BILIRUB SERPL-MCNC: 0.6 MG/DL (ref 0.1–2)
BUN BLD-MCNC: 16 MG/DL (ref 9–23)
CALCIUM BLD-MCNC: 9.7 MG/DL (ref 8.5–10.1)
CHLORIDE SERPL-SCNC: 113 MMOL/L (ref 98–112)
CHOLEST SERPL-MCNC: 190 MG/DL (ref ?–200)
CO2 SERPL-SCNC: 24 MMOL/L (ref 21–32)
CREAT BLD-MCNC: 0.94 MG/DL
EGFRCR SERPLBLD CKD-EPI 2021: 66 ML/MIN/1.73M2 (ref 60–?)
EST. AVERAGE GLUCOSE BLD GHB EST-MCNC: 137 MG/DL (ref 68–126)
FASTING PATIENT LIPID ANSWER: NO
FASTING STATUS PATIENT QL REPORTED: NO
GLOBULIN PLAS-MCNC: 3.7 G/DL (ref 2.8–4.4)
GLUCOSE BLD-MCNC: 104 MG/DL (ref 70–99)
HBA1C MFR BLD: 6.4 % (ref ?–5.7)
HDLC SERPL-MCNC: 72 MG/DL (ref 40–59)
LDLC SERPL CALC-MCNC: 105 MG/DL (ref ?–100)
NONHDLC SERPL-MCNC: 118 MG/DL (ref ?–130)
OSMOLALITY SERPL CALC.SUM OF ELEC: 295 MOSM/KG (ref 275–295)
POTASSIUM SERPL-SCNC: 4 MMOL/L (ref 3.5–5.1)
PROT SERPL-MCNC: 7.2 G/DL (ref 6.4–8.2)
SODIUM SERPL-SCNC: 142 MMOL/L (ref 136–145)
TRIGL SERPL-MCNC: 70 MG/DL (ref 30–149)
VLDLC SERPL CALC-MCNC: 12 MG/DL (ref 0–30)

## 2023-11-15 PROCEDURE — 80053 COMPREHEN METABOLIC PANEL: CPT | Performed by: FAMILY MEDICINE

## 2023-11-15 PROCEDURE — 83036 HEMOGLOBIN GLYCOSYLATED A1C: CPT | Performed by: FAMILY MEDICINE

## 2023-11-15 PROCEDURE — 80061 LIPID PANEL: CPT | Performed by: FAMILY MEDICINE

## 2023-11-15 RX ORDER — METFORMIN HYDROCHLORIDE 500 MG/1
1000 TABLET, EXTENDED RELEASE ORAL
COMMUNITY
Start: 2023-11-15

## 2023-11-15 NOTE — TELEPHONE ENCOUNTER
Is sx persist or if she is not getting better after supportive care measures, then please f-u. Thanks.

## 2023-11-15 NOTE — TELEPHONE ENCOUNTER
From: Haley Ceja  To: Leti Juan  Sent: 11/15/2023 8:16 AM CST  Subject: Labs     Good Wednesday morning   FYI  I had my labs done this morning.  But out of habit I forgot and ate a boil egg and drank a lil water to take my meds     The tech said I could proceed and if needed my Dr will inform me if I need to redo

## 2023-12-20 ENCOUNTER — HOSPITAL ENCOUNTER (OUTPATIENT)
Dept: MAMMOGRAPHY | Facility: HOSPITAL | Age: 68
Discharge: HOME | End: 2023-12-20
Attending: FAMILY MEDICINE
Payer: MEDICARE

## 2023-12-20 DIAGNOSIS — Z12.31 ENCOUNTER FOR SCREENING MAMMOGRAM FOR MALIGNANT NEOPLASM OF BREAST: ICD-10-CM

## 2023-12-20 PROCEDURE — 77063 BREAST TOMOSYNTHESIS BI: CPT | Performed by: FAMILY MEDICINE

## 2023-12-20 PROCEDURE — 77067 SCR MAMMO BI INCL CAD: CPT | Performed by: FAMILY MEDICINE

## 2024-01-06 DIAGNOSIS — I10 ESSENTIAL HYPERTENSION, BENIGN: ICD-10-CM

## 2024-01-08 RX ORDER — METOPROLOL SUCCINATE 50 MG/1
50 TABLET, EXTENDED RELEASE ORAL DAILY
Qty: 90 TABLET | Refills: 1 | Status: SHIPPED | OUTPATIENT
Start: 2024-01-08

## 2024-04-04 ENCOUNTER — PATIENT MESSAGE (OUTPATIENT)
Dept: FAMILY MEDICINE CLINIC | Facility: CLINIC | Age: 69
End: 2024-04-04

## 2024-04-04 LAB
ALBUMIN/GLOBULIN RATIO: 1.6 (CALC) (ref 1–2.5)
ALBUMIN: 4.4 G/DL (ref 3.6–5.1)
ALKALINE PHOSPHATASE: 70 U/L (ref 37–153)
ALT: 15 U/L (ref 6–29)
AST: 16 U/L (ref 10–35)
BILIRUBIN, TOTAL: 0.5 MG/DL (ref 0.2–1.2)
BUN: 19 MG/DL (ref 7–25)
CALCIUM: 9.9 MG/DL (ref 8.6–10.4)
CARBON DIOXIDE: 27 MMOL/L (ref 20–32)
CHLORIDE: 108 MMOL/L (ref 98–110)
CHOL/HDLC RATIO: 2.9 (CALC)
CHOLESTEROL, TOTAL: 223 MG/DL
CREATININE: 0.99 MG/DL (ref 0.5–1.05)
EGFR: 62 ML/MIN/1.73M2
GLOBULIN: 2.8 G/DL (CALC) (ref 1.9–3.7)
GLUCOSE: 92 MG/DL (ref 65–99)
HDL CHOLESTEROL: 78 MG/DL
HEMOGLOBIN A1C: 6 % OF TOTAL HGB
LDL-CHOLESTEROL: 126 MG/DL (CALC)
NON-HDL CHOLESTEROL: 145 MG/DL (CALC)
POTASSIUM: 4.5 MMOL/L (ref 3.5–5.3)
PROTEIN, TOTAL: 7.2 G/DL (ref 6.1–8.1)
SODIUM: 141 MMOL/L (ref 135–146)
TRIGLYCERIDES: 93 MG/DL

## 2024-04-04 NOTE — TELEPHONE ENCOUNTER
From: Ese Rodriguez  To: Lila Ladd  Sent: 4/4/2024 5:20 AM CDT  Subject: Responding from test results     Ok. Will see you all on the 10th. Have a great and safe weekend

## 2024-04-14 ENCOUNTER — PATIENT MESSAGE (OUTPATIENT)
Dept: FAMILY MEDICINE CLINIC | Facility: CLINIC | Age: 69
End: 2024-04-14

## 2024-04-16 RX ORDER — IBUPROFEN 600 MG/1
600 TABLET ORAL EVERY 8 HOURS PRN
Qty: 30 TABLET | Refills: 0 | Status: SHIPPED | OUTPATIENT
Start: 2024-04-16

## 2024-04-16 NOTE — TELEPHONE ENCOUNTER
Refill Passed Protocol:     Pt requesting refill of   Requested Prescriptions     Pending Prescriptions Disp Refills    IBUPROFEN 600 MG Oral Tab [Pharmacy Med Name: IBUPROFEN 600MG TABLETS] 30 tablet 0     Sig: TAKE 1 TABLET(600 MG) BY MOUTH EVERY 8 HOURS AS NEEDED FOR PAIN OR FEVER         Refill was approved and sent to pharmacy:    In person appointment or virtual visit in the past 6 mos or appointment in next 3 mos   Last Time Medication was Filled:  7/05/23    Last Office Visit with Provider: 09/13/23    Appt. scheduled on 4/17/24

## 2024-04-17 ENCOUNTER — OFFICE VISIT (OUTPATIENT)
Dept: FAMILY MEDICINE CLINIC | Facility: CLINIC | Age: 69
End: 2024-04-17
Payer: MEDICARE

## 2024-04-17 VITALS
SYSTOLIC BLOOD PRESSURE: 146 MMHG | RESPIRATION RATE: 18 BRPM | HEART RATE: 56 BPM | TEMPERATURE: 98 F | DIASTOLIC BLOOD PRESSURE: 80 MMHG | OXYGEN SATURATION: 99 % | BODY MASS INDEX: 24 KG/M2 | WEIGHT: 139.38 LBS

## 2024-04-17 DIAGNOSIS — E78.2 MIXED HYPERLIPIDEMIA: ICD-10-CM

## 2024-04-17 DIAGNOSIS — H61.21 IMPACTED CERUMEN OF RIGHT EAR: ICD-10-CM

## 2024-04-17 DIAGNOSIS — I10 ESSENTIAL HYPERTENSION, BENIGN: Primary | ICD-10-CM

## 2024-04-17 DIAGNOSIS — R73.03 PREDIABETES: ICD-10-CM

## 2024-04-17 PROCEDURE — 3077F SYST BP >= 140 MM HG: CPT | Performed by: FAMILY MEDICINE

## 2024-04-17 PROCEDURE — 1160F RVW MEDS BY RX/DR IN RCRD: CPT | Performed by: FAMILY MEDICINE

## 2024-04-17 PROCEDURE — 3079F DIAST BP 80-89 MM HG: CPT | Performed by: FAMILY MEDICINE

## 2024-04-17 PROCEDURE — 96127 BRIEF EMOTIONAL/BEHAV ASSMT: CPT | Performed by: FAMILY MEDICINE

## 2024-04-17 PROCEDURE — G2211 COMPLEX E/M VISIT ADD ON: HCPCS | Performed by: FAMILY MEDICINE

## 2024-04-17 PROCEDURE — 99214 OFFICE O/P EST MOD 30 MIN: CPT | Performed by: FAMILY MEDICINE

## 2024-04-17 PROCEDURE — 1159F MED LIST DOCD IN RCRD: CPT | Performed by: FAMILY MEDICINE

## 2024-04-17 PROCEDURE — 1126F AMNT PAIN NOTED NONE PRSNT: CPT | Performed by: FAMILY MEDICINE

## 2024-04-17 PROCEDURE — 1170F FXNL STATUS ASSESSED: CPT | Performed by: FAMILY MEDICINE

## 2024-04-18 ENCOUNTER — PATIENT MESSAGE (OUTPATIENT)
Dept: FAMILY MEDICINE CLINIC | Facility: CLINIC | Age: 69
End: 2024-04-18

## 2024-04-18 DIAGNOSIS — H61.21 IMPACTED CERUMEN OF RIGHT EAR: Primary | ICD-10-CM

## 2024-04-18 NOTE — TELEPHONE ENCOUNTER
From: Ese Rodriguez  To: Lila Ladd  Sent: 4/18/2024 9:27 AM CDT  Subject: New referral    Good Morning  Hope all is well. The only appointment that the  could give me for Dr Jordan Was May 30. Is there a possible chance I can get a referral for a different ENT that I can check to see if a sooner appointment is available    Have a great day

## 2024-04-22 NOTE — PROGRESS NOTES
CHIEF COMPLAINT:   Chief Complaint   Patient presents with    Follow - Up     Test results          HPI:     Ese Rodriguez is a 68 year old female presents for prediabetes, HTN, HL f-u.    Prediabetes f-u:  2024 A1c is 6.0. She is taking Metformin 500 mg dialy, but takes 1000 mg 1x/ week.  Has no polyuria and no polydipsia.     Previous HPI: Last A1c in 3/2023 was 6.2. She was started on Metformin 500 mg QAM. She has not been tolerating this medication well, due to stomach upset and diarrhea.  She admits to only taking this 1-2x per week, since she needs to be a near a bathroom after taking.  She has no polyuria and no polydipsia. She is due to recheck her labs today.     Previous HPI: Last A1c in 4/2022 was 6.0. She feels well- has no polyuria and no polydipsia. She managed this with diet and staying active.     Previous HPI: A1c from 4/2022 is well-controlled at 6.0.  She has been managing this with diet and lifestyle changes.  She has no polyuria and no polydipsia symptoms. She feels well.      Previous HPI from 6/2022; 4/2022 A1c is 6.0.  She has lost some weight due to stress in her life.  She is working a on healthy diet.  She has no sx of polyuria and no polydipsia.      Previous HPI from 4/2022: Most recent A1c in 4/2022 is 6.0.  She has been working on a healthy diet.  She has no polyuria and no polydipsia.      Previous HPI from 3/2022: She as noted on 2018 labs to have an A1c of 5.8.  She is overdue to recheck. She has no polyuria and no polydipsia. She is not on any medication for this.      HTN f-u: She is taking Amlodpine and Metoprolol without any issues.  BP is normal today- she has no CP, SOB, HA, palpitations, and no leg swelling.     Previous HPI from 4/2022: BP is stable today.  She is currently taking Metoprolol and Amlodipine as prescribed. Tolerating well. She has no HA, no CP, no palpitations, no leg swelling.     Previous HPI from 6/2020: Pt was first noted to have high BP when she  was in [employer] school in  and in a lab checking BP and drawing blood. She went to the ED and BP was in the 200s. She was released from the ED at the time since BP came down on its own. It was not until a few years later she was started on BP lowering medication. She is currently on Metoprolol and tolerating this well.  She has no HA, no change in vision, no CP, palpitations, no leg swelling. She tries to avoid salt in her diet, but loves to eat. 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 She states she had a TIA about 10 yrs ago- had blurry vision while driving, lasted about 5 minutes then resolved. After workup and eval, she was told it was likely a TIA.      HL: She is currently taking Atorvastatin 10 mg, tolerating well. She has no muscle aches or pains.      Ear problem: she feels fullness in her ear, has rining in her right ear.  Was putting \"ear oil\" inside.               HISTORY:  Past Medical History:    Allergic rhinitis    Sinus allergies for years    Back pain    Decorative tattoo    Diabetes (HCC)    Test requested by PCP    Essential hypertension    Hyperlipidemia    Over 15 years.    Wears glasses      Past Surgical History:   Procedure Laterality Date    Colectomy      Colon surgery      Colonoscopy  Oct 2000            Family History   Problem Relation Age of Onset    Diabetes Father     Hypertension Father     Heart Attack Father     Heart Disorder Father     Hypertension Brother     Heart Attack Brother     Stroke Brother     Diabetes Maternal Aunt       Social History:   Social History     Socioeconomic History    Marital status:    Tobacco Use    Smoking status: Never    Smokeless tobacco: Never   Vaping Use    Vaping status: Never Used   Substance and Sexual Activity    Alcohol use: Never    Drug use: Never   Other Topics Concern    Caffeine Concern No    Exercise Yes      Comment: Need to do more.  Not regular    Seat Belt Yes    Special Diet No    Stress Concern No    Weight Concern Yes        Medications (Active prior to today's visit):  Current Outpatient Medications   Medication Sig Dispense Refill    IBUPROFEN 600 MG Oral Tab TAKE 1 TABLET(600 MG) BY MOUTH EVERY 8 HOURS AS NEEDED FOR PAIN OR FEVER 30 tablet 0    metoprolol succinate ER 50 MG Oral Tablet 24 Hr Take 1 tablet (50 mg total) by mouth daily. 90 tablet 1    metFORMIN  MG Oral Tablet 24 Hr Take 1 tablet (500 mg total) by mouth daily with breakfast.      ATORVASTATIN 10 MG Oral Tab TAKE 1 TABLET BY MOUTH AT NIGHT 90 tablet 1    Scopolamine 1.5mg TD patch 1mg/3days Place 1 patch onto the skin every third day. 10 patch 1    amLODIPine 10 MG Oral Tab Take 1 tablet (10 mg total) by mouth daily. 90 tablet 1    cyclobenzaprine 10 MG Oral Tab Take 1 tablet (10 mg total) by mouth nightly as needed for Muscle spasms. 30 tablet 0    Multiple Vitamin (TAB-A-KALPANA) Oral Tab Take 1 tablet by mouth daily.      Olive Leaf Extract 500 MG Oral Cap Take 500 mg by mouth daily.           Allergies:  Allergies   Allergen Reactions    Influenza Vaccine Live RASH     Rash and fatigue after first flu vaccine    Penicillins RASH and OTHER (SEE COMMENTS)     states she has never had it so \"doctor won't give it to me at an older age\"       PSFH elements reviewed from today and agreed except as otherwise stated in HPI.  ROS:     Review of Systems   Constitutional:  Negative for appetite change and fatigue.   HENT:  Negative for ear discharge, ear pain and hearing loss.    Cardiovascular:  Negative for chest pain, palpitations and leg swelling.   Gastrointestinal:  Negative for abdominal pain, constipation, diarrhea, nausea and vomiting.   Endocrine: Negative for polydipsia and polyuria.   Neurological:  Negative for dizziness and headaches.         Pertinent positives and negatives noted in the the HPI.    PHYSICAL EXAM:   /80 (BP  Location: Left arm, Patient Position: Sitting, Cuff Size: adult)   Pulse 56   Temp 97.6 °F (36.4 °C) (Temporal)   Resp 18   Wt 139 lb 6.4 oz (63.2 kg)   LMP  (LMP Unknown)   SpO2 99%   BMI 23.93 kg/m²   Vital signs reviewed.Appears stated age, well groomed.  Physical Exam  Vitals reviewed.   Constitutional:       Appearance: Normal appearance.   HENT:      Right Ear: Ear canal normal. There is impacted cerumen.      Left Ear: Ear canal and external ear normal.   Cardiovascular:      Rate and Rhythm: Normal rate and regular rhythm.      Pulses: Normal pulses.      Heart sounds: Normal heart sounds.   Pulmonary:      Effort: Pulmonary effort is normal.      Breath sounds: Normal breath sounds.   Musculoskeletal:      Right lower leg: No edema.      Left lower leg: No edema.   Skin:     General: Skin is warm and dry.   Neurological:      Mental Status: She is alert and oriented to person, place, and time.   Psychiatric:         Behavior: Behavior normal.          LABS     No visits with results within 2 Month(s) from this visit.   Latest known visit with results is:   Orders Only on 11/15/2023   Component Date Value    CHOLESTEROL, TOTAL 04/03/2024 223 (H)     HDL CHOLESTEROL 04/03/2024 78     TRIGLYCERIDES 04/03/2024 93     LDL-CHOLESTEROL 04/03/2024 126 (H)     CHOL/HDLC RATIO 04/03/2024 2.9     NON-HDL CHOLESTEROL 04/03/2024 145 (H)     GLUCOSE 04/03/2024 92     UREA NITROGEN (BUN) 04/03/2024 19     CREATININE 04/03/2024 0.99     EGFR 04/03/2024 62     BUN/CREATININE RATIO 04/03/2024 SEE NOTE:     SODIUM 04/03/2024 141     POTASSIUM 04/03/2024 4.5     CHLORIDE 04/03/2024 108     CARBON DIOXIDE 04/03/2024 27     CALCIUM 04/03/2024 9.9     PROTEIN, TOTAL 04/03/2024 7.2     ALBUMIN 04/03/2024 4.4     GLOBULIN 04/03/2024 2.8     ALBUMIN/GLOBULIN RATIO 04/03/2024 1.6     BILIRUBIN, TOTAL 04/03/2024 0.5     ALKALINE PHOSPHATASE 04/03/2024 70     AST 04/03/2024 16     ALT 04/03/2024 15     HEMOGLOBIN A1c 04/03/2024  6.0 (H)       REVIEWED THIS VISIT  ASSESSMENT/PLAN:   68 year old female with    1. Essential hypertension, benign  - BP stable today  - Lytes and Cr are normal on 4/2024 labs, due to recheck today  - cont Amlodipine and Metoprolol  - d/w pt a healthy, low-sodium diet, regular exercise, and wt loss  - RTC in 6 months for f-u    - COMP METABOLIC PANEL [90620] [Q]; Future  - LIPID PANEL [7600] [Q]; Future  - COMP METABOLIC PANEL [38464] [Q]  - LIPID PANEL [7600] [Q]    2. Mixed hyperlipidemia  - 4/2024 lipids normal  - cont Atorvastatin 10 mg daily  - d/w pt a healthy, low-fat/low-cholesterol diet, regular exercise, and wt loss    - RTC in 6 months for f-u  - COMP METABOLIC PANEL [99159] [Q]; Future  - LIPID PANEL [7600] [Q]; Future  - COMP METABOLIC PANEL [64446] [Q]  - LIPID PANEL [7600] [Q]    3. Prediabetes  - 4/2024 A1c is 6.0 is taking etformin  mg QAM, but takes 1000 mg once weekly  - due to recheck labs, await results  - f-u in 6 months  - HGB A1C [496] [Q]; Future  - HGB A1C [496] [Q]    4. Impacted cerumen of right ear  - referral to ENT    - ENT Referral - In [facility] This Visit:  Requested Prescriptions      No prescriptions requested or ordered in this encounter       Health Maintenance:  Health Maintenance   Topic Date Due    Zoster Vaccines (2 of 3) 08/12/2016    MA Annual Health Assessment  01/01/2024    HTN: BP Follow-Up  05/17/2024    Pneumococcal Vaccine: 65+ Years (1 of 1 - PCV) 05/17/2024 (Originally 10/22/2020)    COVID-19 Vaccine (3 - 2023-24 season) 01/01/2025 (Originally 9/1/2023)    Influenza Vaccine (Season Ended) 10/01/2024    Mammogram  12/20/2024    Colorectal Cancer Screening  07/23/2026    DEXA Scan  Completed    Annual Depression Screening  Completed    Fall Risk Screening (Annual)  Completed         Patient/Caregiver Education: There are no barriers to learning. Medical education done.   Outcome: Patient verbalizes understanding and agrees with plan. Advised to call or  RTC if symptoms persist or worsen.    Problem List:     Patient Active Problem List   Diagnosis    Sciatica of left side    Lumbar radiculopathy    Age-related nuclear cataract of both eyes    Carpal tunnel syndrome of left wrist    Essential hypertension, benign    Family history of early CAD    Herpes genitalis    Parotid mass    Prediabetes    Mixed hyperlipidemia    TIA (transient ischemic attack)    Special screening for malignant neoplasms, colon    Benign neoplasm of transverse colon    Osteopenia of neck of femur       Imaging & Referrals:  ENT - INTERNAL     4/22/2024  Lila Ladd MD      Patient understands plan and follow-up.  Return in about 6 months (around 10/17/2024) for annual physical, HTN, Cholesterol, prediabetes, medication follow-up.

## 2024-05-14 ENCOUNTER — TELEPHONE (OUTPATIENT)
Dept: FAMILY MEDICINE CLINIC | Facility: CLINIC | Age: 69
End: 2024-05-14

## 2024-05-20 DIAGNOSIS — R73.03 PREDIABETES: ICD-10-CM

## 2024-05-20 RX ORDER — METFORMIN HYDROCHLORIDE 500 MG/1
500 TABLET, EXTENDED RELEASE ORAL
Qty: 90 TABLET | Refills: 1 | Status: SHIPPED | OUTPATIENT
Start: 2024-05-20

## 2024-05-20 NOTE — TELEPHONE ENCOUNTER
Refill Failed Protocol:     Pt requesting refill of   Requested Prescriptions     Pending Prescriptions Disp Refills    METFORMIN  MG Oral Tablet 24 Hr [Pharmacy Med Name: METFORMIN ER 500MG 24HR TABS] 90 tablet 0     Sig: TAKE 1 TABLET(500 MG) BY MOUTH DAILY WITH BREAKFAST     Last Time Medication was Filled:  11/15/2023    Last Office Visit with Provider: 4/17/2024    Unable to approve refill,     Diabetes Medication Protocol [id number]/2024 08:12 AM   Protocol Details Microalbumin procedure in past 12 months or taking ACE/ARB    Last A1C < 7.5 and within past 6 months    In person appointment or virtual visit in the past 6 mos or appointment in next 3 mos    EGFRCR or GFRNAA > 50    GFR in the past 12 months         Prediabetes  - 4/2024 A1c is 6.0 is taking etformin  mg QAM, but takes 1000 mg once weekly  - due to recheck labs, await results  - f-u in 6 months  - HGB A1C [496] [Q]; Future  - HGB A1C [496] [Q]    No future appointments.

## 2024-06-12 ENCOUNTER — OFFICE VISIT (OUTPATIENT)
Dept: FAMILY MEDICINE CLINIC | Facility: CLINIC | Age: 69
End: 2024-06-12
Payer: MEDICARE

## 2024-06-12 VITALS
SYSTOLIC BLOOD PRESSURE: 128 MMHG | RESPIRATION RATE: 16 BRPM | BODY MASS INDEX: 24.01 KG/M2 | HEIGHT: 64 IN | TEMPERATURE: 98 F | WEIGHT: 140.63 LBS | HEART RATE: 69 BPM | DIASTOLIC BLOOD PRESSURE: 62 MMHG | OXYGEN SATURATION: 97 %

## 2024-06-12 DIAGNOSIS — T75.3XXS MOTION SICKNESS, SEQUELA: ICD-10-CM

## 2024-06-12 DIAGNOSIS — R73.03 PREDIABETES: ICD-10-CM

## 2024-06-12 DIAGNOSIS — E78.2 MIXED HYPERLIPIDEMIA: ICD-10-CM

## 2024-06-12 DIAGNOSIS — I10 ESSENTIAL HYPERTENSION, BENIGN: ICD-10-CM

## 2024-06-12 DIAGNOSIS — Z00.00 ANNUAL PHYSICAL EXAM: Primary | ICD-10-CM

## 2024-06-12 RX ORDER — METOPROLOL SUCCINATE 50 MG/1
50 TABLET, EXTENDED RELEASE ORAL DAILY
Qty: 90 TABLET | Refills: 1 | Status: SHIPPED | OUTPATIENT
Start: 2024-06-12

## 2024-06-12 RX ORDER — METFORMIN HYDROCHLORIDE 500 MG/1
TABLET, EXTENDED RELEASE ORAL
COMMUNITY
Start: 2024-06-12

## 2024-06-12 NOTE — PROGRESS NOTES
REASON FOR VISIT:    Ese Rodriguez is a 68 year old female who presents for a MA Supervisit.    Ese is a 67 yo F here for annual physical. 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 ]    Last pap smear: aged out  Last mammogram: 12/2023, normal  Last colonoscopy: 7/2021, rpt in 5 yrs  Last DEXA Scan: 1/2022 with osteopenia    Prediabetes f-u:  4/2024 A1c is 6.0. She is taking Metformin 500 mg total WEEKLY.  Has no polyuria and no polydipsia.      Previous HPI: Last A1c in 3/2023 was 6.2. She was started on Metformin 500 mg QAM. She has not been tolerating this medication well, due to stomach upset and diarrhea.  She admits to only taking this 1-2x per week, since she needs to be a near a bathroom after taking.  She has no polyuria and no polydipsia. She is due to recheck her labs today.     Previous HPI: Last A1c in 4/2022 was 6.0. She feels well- has no polyuria and no polydipsia. She managed this with diet and staying active.     Previous HPI: A1c from 4/2022 is well-controlled at 6.0.  She has been managing this with diet and lifestyle changes.  She has no polyuria and no polydipsia symptoms. She feels well.      Previous HPI from 6/2022; 4/2022 A1c is 6.0.  She has lost some weight due to stress in her life.  She is working a on healthy diet.  She has no sx of polyuria and no polydipsia.      Previous HPI from 4/2022: Most recent A1c in 4/2022 is 6.0.  She has been working on a healthy diet.  She has no polyuria and no polydipsia.      Previous HPI from 3/2022: She as noted on 2018 labs to have an A1c of 5.8.  She is overdue to recheck. She has no polyuria and no polydipsia. She is not on any medication for this.      HTN f-u: She is taking Metoprolol without any issues.  Has been of fof Amlodipine for \"awhile.\" BP is normal today- she has no CP, SOB, HA, palpitations, and no leg swelling.     Previous HPI from 4/2022: BP is stable today.  She is currently taking Metoprolol and Amlodipine  as prescribed. Tolerating well. She has no HA, no CP, no palpitations, no leg swelling.     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 She went to the ED and BP was in the 200s. She was released from the ED at the time since BP came down on its own. It was not until a few years later she was started on BP lowering medication. She is currently on Metoprolol and tolerating this well.  She has no HA, no change in vision, no CP, palpitations, no leg swelling. She tries to avoid salt in her diet, but loves to eat. 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 She states she had a TIA about 10 yrs ago- had blurry vision while driving, lasted about 5 minutes then resolved. After workup and eval, she was told it was likely a TIA.      HL: She is currently taking Atorvastatin 10 mg, tolerating well. She has no muscle aches or pains.    Dizziness: stable, has not had any repeat episodes in several months.        Patient Care Team: Patient Care Team:  Lila Ladd MD as PCP - General (Family Medicine)  Jesus Amin PA (Physician Assistant)  Benedict Muller PT as Physical Therapist  Yola Verde PT as Physical Therapist    Patient Active Problem List   Diagnosis    Sciatica of left side    Lumbar radiculopathy    Age-related nuclear cataract of both eyes    Carpal tunnel syndrome of left wrist    Essential hypertension, benign    Family history of early CAD    Herpes genitalis    Parotid mass    Prediabetes    Mixed hyperlipidemia    TIA (transient ischemic attack)    Special screening for malignant neoplasms, colon    Benign neoplasm of transverse colon    Osteopenia of neck of femur     Current Outpatient Medications   Medication Sig Dispense Refill    metFORMIN  MG Oral Tablet 24 Hr Take 1 one  half tab (250 mg) 2 times a week      metoprolol succinate ER 50 MG Oral Tablet 24 Hr Take 1 tablet (50 mg total) by mouth daily. 90 tablet 1    ATORVASTATIN 10 MG Oral Tab TAKE 1 TABLET BY MOUTH AT NIGHT 90 tablet 1    Scopolamine 1.5mg TD patch 1mg/3days Place 1 patch onto the skin every third day. 10 patch 1    Multiple Vitamin (TAB-A-KALPANA) Oral Tab Take 1 tablet by mouth daily.      Olive Leaf Extract 500 MG Oral Cap Take 500 mg by mouth daily.   (Patient not taking: Reported on 6/12/2024)             Latest Ref Rng & Units 4/3/2024     9:55 AM 11/15/2023     8:04 AM 3/31/2023     9:58 AM 4/7/2022     9:06 AM 3/22/2021     9:00 AM 9/13/2020     1:33 PM 6/18/2020     8:47 AM   Glucose and HbA1c   Glucose 65 - 99 mg/dL 92  104  98  98  85  107  90          Latest Ref Rng & Units 4/3/2024     9:55 AM 11/15/2023     8:04 AM 3/31/2023     9:58 AM 4/7/2022     9:06 AM 3/22/2021     9:00 AM 6/18/2020     8:47 AM   Cholesterol   Total Cholesterol <200 mg/dL 223  190  191  174  254  240    Triglycerides <150 mg/dL 93  70  62  85  90  94    HDL > OR = 50 mg/dL 78  72  72  64  77  77    LDL mg/dL (calc) 126  105  107  92  157  143          Latest Ref Rng & Units 4/3/2024     9:55 AM 11/15/2023     8:04 AM 3/31/2023     9:58 AM 4/7/2022     9:06 AM 3/22/2021     9:00 AM 9/13/2020     1:33 PM 6/18/2020     8:47 AM   BUN and Cr   BUN 7 - 25 mg/dL 19  16  11  11  15  15  14    Creatinine 0.50 - 1.05 mg/dL 0.99  0.94  0.91  0.86  0.98  1.05  0.86          Latest Ref Rng & Units 4/3/2024     9:55 AM 11/15/2023     8:04 AM 3/31/2023     9:58 AM 4/7/2022     9:06 AM 3/22/2021     9:00 AM 9/13/2020     1:33 PM 6/18/2020     8:47 AM   AST and ALT   AST (SGOT) 10 - 35 U/L 16  15  17  18  16  27  18    ALT (SGPT) 6 - 29 U/L 15  26  22  16  12  25  13          Latest Ref Rng & Units 4/7/2022     9:06 AM 6/18/2020     8:47 AM   TSH and Free T4   TSH 0.40 - 4.50 mIU/L 1.54  0.74         General Health      In the past six months, have  you lost more than 10 pounds without trying?: 2 - No  Has your appetite been poor?: No  Type of Diet: Diabetic  How does the patient maintain a good energy level?: Daily Walks;Other  How would you describe your daily physical activity?: Moderate  How would you describe your current health state?: Good  How do you maintain positive mental well-being?: Social Interaction;Visiting Friends  Have you had any immunizations at another office such as Influenza, Hepatitis B, Tetanus, or Pneumococcal?: No     Functional Ability     Bathing or Showering: Able without help  Toileting: Able without help  Dressing: Able without help  Eating: Able without help  Driving: Able without help  Preparing your meals: Able without help  Managing money/bills: Able without help  Taking medications as prescribed: Able without help  Are you able to afford your medications?: Yes  Hearing Problems?: No     Functional Status     Hearing Problems?: No  Vision Problems? : No  Difficulty walking?: No  Difficulty dressing or bathing?: No  Problems with daily activities? : No  Memory Problems?: No      Fall/Risk Assessment                 Depression Screening (PHQ-2/PHQ-9): Over the LAST 2 WEEKS            Advance Directives     Do you have a healthcare power of ?: No  Do you have a living will?: Yes     Cognitive Assessment     What day of the week is this?: Correct  What month is it?: Correct  What year is it?: Correct  Recall \"Ball\": Correct  Recall \"Flag\": Correct  Recall \"Tree\": Correct         PREVENTATIVE SERVICES   INDICATIONS AND SCHEDULE Internal Lab or Procedure   Diabetes Screening     HbgA1C   Annually HEMOGLOBIN A1c (% of total Hgb)   Date Value   04/03/2024 6.0 (H)       Fasting Blood Sugar (FSB)Annually GLUCOSE (mg/dL)   Date Value   04/03/2024 92      Cardiovascular Disease Screening    LDL Annually LDL-CHOLESTEROL (mg/dL (calc))   Date Value   04/03/2024 126 (H)       EKG - w/ Initial Preventative Physical Exam only, or if  medically necessary N/a   Colorectal Cancer Screening     Colonoscopy Screen every 10 years Health Maintenance   Topic Date Due    Colorectal Cancer Screening  2026       Flex Sigmoidoscopy Screen every 5 years No results found for this or any previous visit.    Fecal Occult Blood Annually No results found for: \"FOB\", \"OCCULTSTOOL\"   Glaucoma Screening     Ophthalmology Visit Annually N/a   Immunizations     Zoster (Not covered by Medicare Part B) No orders found for this or any previous visit.     SPECIFIC DISEASE MONITORING Internal Lab or Procedure   No disease specific diagnoses       ALLERGIES:     Allergies   Allergen Reactions    Influenza Vaccine Live RASH     Rash and fatigue after first flu vaccine    Penicillins RASH and OTHER (SEE COMMENTS)     states she has never had it so \"doctor won't give it to me at an older age\"     MEDICAL INFORMATION:   Past Medical History:    Allergic rhinitis    Sinus allergies for years    Back pain    Decorative tattoo    Diabetes (HCC)    Test requested by PCP    Essential hypertension    Hyperlipidemia    Over 15 years.    Wears glasses      Past Surgical History:   Procedure Laterality Date    Colectomy      Colon surgery      Colonoscopy  Oct 2000            Family History   Problem Relation Age of Onset    Diabetes Father     Hypertension Father     Heart Attack Father     Heart Disorder Father     Hypertension Brother     Heart Attack Brother     Stroke Brother     Diabetes Maternal Aunt      Immunization History      Immunization History  Administered            Date(s) Administered    Covid-19 Vaccine Pfizer 30 mcg/0.3 ml                          2021      TDAP                  12/10/2009  2020      Tb Intradermal Test   2010  2011  10/18/2013      Zoster Vaccine Live (Zostavax)                          2016        SOCIAL HISTORY:   Social History     Socioeconomic History    Marital status:    Tobacco Use     Smoking status: Never    Smokeless tobacco: Never   Vaping Use    Vaping status: Never Used   Substance and Sexual Activity    Alcohol use: Never    Drug use: Never   Other Topics Concern    Caffeine Concern No    Exercise Yes     Comment: Need to do more.  Not regular    Seat Belt Yes    Special Diet No    Stress Concern No    Weight Concern Yes        REVIEW OF SYSTEMS:   GENERAL: feels well otherwise  SKIN: denies any unusual skin lesions  EYES: denies blurred vision or double vision  HEENT: denies nasal congestion, sinus pain or ST  LUNGS: denies shortness of breath with exertion  CARDIOVASCULAR: denies chest pain on exertion  GI: denies abdominal pain, denies heartburn  : denies dysuria, vaginal discharge or itching  MUSCULOSKELETAL: denies back pain  NEURO: denies headaches  PSYCHE: denies depression or anxiety  HEMATOLOGIC: denies hx of anemia  ENDOCRINE: denies thyroid history  ALL/ASTHMA: denies hx of allergy or asthma    EXAM:   /62 (BP Location: Left arm, Patient Position: Sitting, Cuff Size: adult)   Pulse 69   Temp 97.6 °F (36.4 °C) (Temporal)   Resp 16   Ht 5' 4\" (1.626 m)   Wt 140 lb 9.6 oz (63.8 kg)   LMP  (LMP Unknown)   SpO2 97%   BMI 24.13 kg/m²    >   BP Readings from Last 3 Encounters:   06/12/24 128/62   04/17/24 146/80   09/13/23 136/78     GENERAL: well developed, well nourished, in no apparent distress   SKIN: no rashes, no suspicious lesions  HEENT: atraumatic, normocephalic, ears and throat are clear                Hearing Assessed via: Questionnaire  EYES: PERRLA, EOMI, conjunctiva are clear    NECK: supple, no adenopathy, no bruits  CHEST: no chest tenderness  BREAST:deferred   LUNGS: clear to auscultation  CARDIO: RRR without murmur  GI: good BS's, no masses, HSM or tenderness  : deferred  RECTAL: deferred  MUSCULOSKELETAL: back is not tender, FROM of the back  EXTREMITIES: no cyanosis, clubbing or edema  NEURO: Oriented times three, cranial nerves are intact, motor  and sensory are grossly intact      ASSESSMENT AND OTHER RELEVANT CHRONIC CONDITIONS:   Ese Rodriguez is a 68 year old female who presents for a Medicare Assessment.     PLAN SUMMARY:   Diagnoses and all orders for this visit:    Annual physical exam  Completed MA Supervisit today. Counseled pt on healthy lifestyle changes. Vaccines today: UTD . Contraception: postmenopausal .     Last pap smear: aged out  Last mammogram: 12/2023, normal  Last colonoscopy: 7/2021, rpt in 5 yrs  Last DEXA Scan: 1/2022 with osteopenia    Essential hypertension, benign    -     metoprolol succinate ER 50 MG Oral Tablet 24 Hr; Take 1 tablet (50 mg total) by mouth daily.    Mixed hyperlipidemia  - 4/2024 lipids normal, due to recheck in 10/2024  - cont Atorvastatin 10 mg daily  - d/w pt a healthy, low-fat/low-cholesterol diet, regular exercise, and wt loss     Prediabetes  - 4/2024 A1c is 6.0 is taking etformin  mg total weekly  - due to recheck labs in 10/2024  - f-u in 6 months    Motion sickness, sequela  - stable, no repeat episodes  - when it occurs, she uses the Scopolamine patch prn       The patient indicates understanding of these issues and agrees to the plan.  The patient is asked to return in 6 months for medication check, office visit, and reassessment  Diet counseling perfomed  Exercise counseling perfomed    SUGGESTED VACCINATIONS - Influenza, Pneumococcal, Zoster, Tetanus   Influenza: No recommendations at this time  Pneumonia: No recommendations at this time

## 2024-06-30 ENCOUNTER — PATIENT MESSAGE (OUTPATIENT)
Dept: FAMILY MEDICINE CLINIC | Facility: CLINIC | Age: 69
End: 2024-06-30

## 2024-07-02 NOTE — TELEPHONE ENCOUNTER
From: Ese Rodriguez  To: Lila Ladd  Sent: 6/30/2024 8:26 PM CDT  Subject: Knee pain    Greetings Dr Kim    June 2 2024. I slip on an object in the [place] [facility] a [facility]. I fell directly on my knees. My left knee took a hard hit on the floor. I was helped us by 2 employees. I was able to walk ok. Knees lil sore but not painful to walk. I did fill out an incidental form. Refuse to go to  because I felt fine. Now 2 weeks ago knee still sore to touch but lil burning sensations started all around the inner leg area. Not on going but very uncomfortable. Should I make a appointment with you to see if a lucita might be needed    Tori

## 2024-07-10 ENCOUNTER — OFFICE VISIT (OUTPATIENT)
Dept: FAMILY MEDICINE CLINIC | Facility: CLINIC | Age: 69
End: 2024-07-10
Payer: MEDICARE

## 2024-07-10 VITALS
OXYGEN SATURATION: 98 % | BODY MASS INDEX: 24 KG/M2 | WEIGHT: 140.38 LBS | TEMPERATURE: 98 F | SYSTOLIC BLOOD PRESSURE: 138 MMHG | RESPIRATION RATE: 18 BRPM | DIASTOLIC BLOOD PRESSURE: 82 MMHG | HEART RATE: 61 BPM

## 2024-07-10 DIAGNOSIS — M25.562 CHRONIC PAIN OF LEFT KNEE: Primary | ICD-10-CM

## 2024-07-10 DIAGNOSIS — G89.29 CHRONIC PAIN OF LEFT KNEE: Primary | ICD-10-CM

## 2024-07-10 PROCEDURE — 99213 OFFICE O/P EST LOW 20 MIN: CPT | Performed by: FAMILY MEDICINE

## 2024-07-10 PROCEDURE — 3079F DIAST BP 80-89 MM HG: CPT | Performed by: FAMILY MEDICINE

## 2024-07-10 PROCEDURE — 1170F FXNL STATUS ASSESSED: CPT | Performed by: FAMILY MEDICINE

## 2024-07-10 PROCEDURE — 1125F AMNT PAIN NOTED PAIN PRSNT: CPT | Performed by: FAMILY MEDICINE

## 2024-07-10 PROCEDURE — 1160F RVW MEDS BY RX/DR IN RCRD: CPT | Performed by: FAMILY MEDICINE

## 2024-07-10 PROCEDURE — 1159F MED LIST DOCD IN RCRD: CPT | Performed by: FAMILY MEDICINE

## 2024-07-10 PROCEDURE — 3075F SYST BP GE 130 - 139MM HG: CPT | Performed by: FAMILY MEDICINE

## 2024-07-10 NOTE — PROGRESS NOTES
CHIEF COMPLAINT:   Chief Complaint   Patient presents with    Knee Pain     Left knee burring/ pain          HPI:     Ese Rodriguez is a 68 year old female presents for knee pain.    Knee pain: pt had a fall injury on 24. She tripped on a wire and fell onto her left knee.  She was able to ambulate immediately after.  Has been feeling soreness, deep inside the knee, now radiating burning pain to the back of her leg. She realized at [facility] she was unable to kneel on that knee. Otherwise, she is still able to walk and bear weight.  He no swelling and no redness.  She has difficulty crossing her legs (at the knee). She takes Advil with relief.               HISTORY:  Past Medical History:    Allergic rhinitis    Sinus allergies for years    Back pain    Decorative tattoo    Diabetes (HCC)    Test requested by PCP    Essential hypertension    Hyperlipidemia    Over 15 years.    Sleep apnea    Type 1 diabetes mellitus (HCC)    Wears glasses      Past Surgical History:   Procedure Laterality Date    Colectomy      Colon surgery      Colonoscopy  Oct 2000            Family History   Problem Relation Age of Onset    Diabetes Father     Hypertension Father     Heart Attack Father     Heart Disorder Father     Hypertension Brother     Heart Attack Brother     Stroke Brother     Diabetes Maternal Aunt       Social History:   Social History     Socioeconomic History    Marital status:    Tobacco Use    Smoking status: Never    Smokeless tobacco: Never   Vaping Use    Vaping status: Never Used   Substance and Sexual Activity    Alcohol use: Never    Drug use: Never   Other Topics Concern    Caffeine Concern No    Exercise Yes     Comment: Need to do more.  Not regular    Seat Belt Yes    Special Diet No    Stress Concern No    Weight Concern Yes        Medications (Active prior to today's visit):  Current Outpatient Medications   Medication Sig Dispense Refill    metFORMIN  MG Oral Tablet 24 Hr Take  1 one half tab (250 mg) 2 times a week      metoprolol succinate ER 50 MG Oral Tablet 24 Hr Take 1 tablet (50 mg total) by mouth daily. 90 tablet 1    ATORVASTATIN 10 MG Oral Tab TAKE 1 TABLET BY MOUTH AT NIGHT 90 tablet 1    Scopolamine 1.5mg TD patch 1mg/3days Place 1 patch onto the skin every third day. 10 patch 1    Multiple Vitamin (TAB-A-KALPANA) Oral Tab Take 1 tablet by mouth daily.      Olive Leaf Extract 500 MG Oral Cap Take 500 mg by mouth daily.   (Patient not taking: Reported on 6/12/2024)         Allergies:  Allergies   Allergen Reactions    Influenza Vaccine Live RASH     Rash and fatigue after first flu vaccine    Penicillins RASH and OTHER (SEE COMMENTS)     states she has never had it so \"doctor won't give it to me at an older age\"       PSFH elements reviewed from today and agreed except as otherwise stated in HPI.  ROS:     Review of Systems   Musculoskeletal:  Positive for arthralgias. Negative for gait problem, joint swelling and myalgias.   Neurological:  Negative for weakness and numbness.         Pertinent positives and negatives noted in the the HPI.    PHYSICAL EXAM:   /82 (BP Location: Left arm, Patient Position: Sitting, Cuff Size: adult)   Pulse 61   Temp 97.7 °F (36.5 °C) (Temporal)   Resp 18   Wt 140 lb 6.4 oz (63.7 kg)   LMP  (LMP Unknown)   SpO2 98%   BMI 24.10 kg/m²   Vital signs reviewed.Appears stated age, well groomed.  Physical Exam  Vitals reviewed.   Constitutional:       Appearance: Normal appearance.   HENT:      Head: Normocephalic.   Pulmonary:      Effort: Pulmonary effort is normal.      Breath sounds: Normal breath sounds.   Musculoskeletal:         General: Signs of injury present. No swelling, tenderness or deformity. Normal range of motion.      Right lower leg: No edema.      Left lower leg: No edema.      Comments: No visible bony abnormality, no edema, no erythema   Skin:     General: Skin is warm and dry.   Neurological:      Mental Status: She is  alert and oriented to person, place, and time.          LABS     No visits with results within 2 Month(s) from this visit.   Latest known visit with results is:   Orders Only on 11/15/2023   Component Date Value    CHOLESTEROL, TOTAL 04/03/2024 223 (H)     HDL CHOLESTEROL 04/03/2024 78     TRIGLYCERIDES 04/03/2024 93     LDL-CHOLESTEROL 04/03/2024 126 (H)     CHOL/HDLC RATIO 04/03/2024 2.9     NON-HDL CHOLESTEROL 04/03/2024 145 (H)     GLUCOSE 04/03/2024 92     UREA NITROGEN (BUN) 04/03/2024 19     CREATININE 04/03/2024 0.99     EGFR 04/03/2024 62     BUN/CREATININE RATIO 04/03/2024 SEE NOTE:     SODIUM 04/03/2024 141     POTASSIUM 04/03/2024 4.5     CHLORIDE 04/03/2024 108     CARBON DIOXIDE 04/03/2024 27     CALCIUM 04/03/2024 9.9     PROTEIN, TOTAL 04/03/2024 7.2     ALBUMIN 04/03/2024 4.4     GLOBULIN 04/03/2024 2.8     ALBUMIN/GLOBULIN RATIO 04/03/2024 1.6     BILIRUBIN, TOTAL 04/03/2024 0.5     ALKALINE PHOSPHATASE 04/03/2024 70     AST 04/03/2024 16     ALT 04/03/2024 15     HEMOGLOBIN A1c 04/03/2024 6.0 (H)       REVIEWED THIS VISIT  ASSESSMENT/PLAN:   68 year old female with    1. Chronic pain of left knee  - r/o any bony injury, order XR Knee and await results  - cont OTC analgesics prn  - supportive care d/w pt  - if pain persists, ok to see Ortho (Dr. Coombs referral given)    - XR KNEE (3 VIEWS), LEFT (CPT=73562); Future  - Ortho Referral - In [facility]      Meds This Visit:  Requested Prescriptions      No prescriptions requested or ordered in this encounter       Health Maintenance:  Health Maintenance   Topic Date Due    Pneumococcal Vaccine: 65+ Years (1 of 1 - PCV) 07/12/2024 (Originally 10/22/2020)    Zoster Vaccines (2 of 3) 07/12/2024 (Originally 8/12/2016)    COVID-19 Vaccine (3 - 2023-24 season) 01/01/2025 (Originally 9/1/2023)    Influenza Vaccine (1) 10/01/2024    Mammogram  12/20/2024    Colorectal Cancer Screening  07/23/2026    DEXA Scan  Completed    MA Annual Health Assessment  Completed     Annual Depression Screening  Completed    Fall Risk Screening (Annual)  Completed         Patient/Caregiver Education: There are no barriers to learning. Medical education done.   Outcome: Patient verbalizes understanding and agrees with plan. Advised to call or RTC if symptoms persist or worsen.    Problem List:     Patient Active Problem List   Diagnosis    Sciatica of left side    Lumbar radiculopathy    Age-related nuclear cataract of both eyes    Carpal tunnel syndrome of left wrist    Essential hypertension, benign    Family history of early CAD    Herpes genitalis    Parotid mass    Prediabetes    Mixed hyperlipidemia    TIA (transient ischemic attack)    Special screening for malignant neoplasms, colon    Benign neoplasm of transverse colon    Osteopenia of neck of femur       Imaging & Referrals:  ORTHOPEDIC - INTERNAL  XR KNEE (3 VIEWS), LEFT (CPT=73562)     7/10/2024  Lila Ladd MD      Patient understands plan and follow-up.  Return for follow-up depending on lab results.

## 2024-07-13 DIAGNOSIS — I10 ESSENTIAL HYPERTENSION, BENIGN: ICD-10-CM

## 2024-07-15 RX ORDER — METOPROLOL SUCCINATE 50 MG/1
50 TABLET, EXTENDED RELEASE ORAL DAILY
Qty: 90 TABLET | Refills: 1 | OUTPATIENT
Start: 2024-07-15

## 2024-07-15 NOTE — TELEPHONE ENCOUNTER
Refill requested too soon:     Pt requesting refill of   Requested Prescriptions     Refused Prescriptions Disp Refills    metoprolol succinate ER 50 MG Oral Tablet 24 Hr 90 tablet 1     Sig: Take 1 tablet (50 mg total) by mouth daily.       Last Time Medication was Filled:  6/12/2024    Remaining refills: 90 days 1 refills    Refused medication since patient has requested refill too soon.

## 2024-07-21 DIAGNOSIS — I10 ESSENTIAL HYPERTENSION, BENIGN: ICD-10-CM

## 2024-07-22 RX ORDER — METOPROLOL SUCCINATE 50 MG/1
50 TABLET, EXTENDED RELEASE ORAL DAILY
Qty: 90 TABLET | Refills: 1 | OUTPATIENT
Start: 2024-07-22

## 2024-07-22 RX ORDER — METOPROLOL SUCCINATE 50 MG/1
50 TABLET, EXTENDED RELEASE ORAL DAILY
Qty: 90 TABLET | Refills: 0 | Status: SHIPPED | OUTPATIENT
Start: 2024-07-22

## 2024-07-22 NOTE — TELEPHONE ENCOUNTER
Refill Passed Protocol:     Pt requesting refill of   Requested Prescriptions     Pending Prescriptions Disp Refills    metoprolol succinate ER 50 MG Oral Tablet 24 Hr [Pharmacy Med Name: METOPROLOL ER SUCCINATE 50MG TABS] 90 tablet 0     Sig: TAKE 1 TABLET(50 MG) BY MOUTH DAILY      Refill was approved and sent to pharmacy:   Hypertension Medications Protocol [id number]/21/2024 07:17 AM   Protocol Details CMP or BMP in past 12 months    Last BP reading less than 140/90    In person appointment or virtual visit in the past 12 mos or appointment in next 3 mos    EGFRCR or GFRNAA > 50     Last Time Medication was Filled:  6/12/2024    Last Office Visit with Provider: 7/10/2024    Appt. scheduled on 12/11/2024

## 2024-07-26 ENCOUNTER — HOSPITAL ENCOUNTER (OUTPATIENT)
Dept: GENERAL RADIOLOGY | Age: 69
Discharge: HOME | End: 2024-07-26
Attending: FAMILY MEDICINE
Payer: MEDICARE

## 2024-07-26 DIAGNOSIS — G89.29 CHRONIC PAIN OF LEFT KNEE: ICD-10-CM

## 2024-07-26 DIAGNOSIS — M25.562 CHRONIC PAIN OF LEFT KNEE: ICD-10-CM

## 2024-07-26 PROCEDURE — 73562 X-RAY EXAM OF KNEE 3: CPT | Performed by: FAMILY MEDICINE

## 2024-08-26 ENCOUNTER — TELEPHONE (OUTPATIENT)
Dept: FAMILY MEDICINE CLINIC | Facility: CLINIC | Age: 69
End: 2024-08-26

## 2024-08-26 NOTE — TELEPHONE ENCOUNTER
Reached patient for medication adherence consult. Patient overdue for refill on atorvastatin per insurance report. Patient also appears overdue for refill on metformin.     Patient reports that she is taking the medication as prescribed. She reports tolerating the medication well. She denies the need for refills at this time.    Provided education on importance of adherence. Patient denies any questions or concerns with medication.

## 2024-10-17 LAB
ALBUMIN/GLOBULIN RATIO: 1.6 (CALC) (ref 1–2.5)
ALBUMIN: 4.4 G/DL (ref 3.6–5.1)
ALKALINE PHOSPHATASE: 78 U/L (ref 37–153)
ALT: 15 U/L (ref 6–29)
AST: 17 U/L (ref 10–35)
BILIRUBIN, TOTAL: 0.6 MG/DL (ref 0.2–1.2)
BUN: 17 MG/DL (ref 7–25)
CALCIUM: 10.3 MG/DL (ref 8.6–10.4)
CARBON DIOXIDE: 27 MMOL/L (ref 20–32)
CHLORIDE: 107 MMOL/L (ref 98–110)
CHOL/HDLC RATIO: 2.8 (CALC)
CHOLESTEROL, TOTAL: 220 MG/DL
CREATININE: 0.89 MG/DL (ref 0.5–1.05)
EGFR: 71 ML/MIN/1.73M2
GLOBULIN: 2.7 G/DL (CALC) (ref 1.9–3.7)
GLUCOSE: 95 MG/DL (ref 65–99)
HDL CHOLESTEROL: 80 MG/DL
HEMOGLOBIN A1C: 6.2 % OF TOTAL HGB
LDL-CHOLESTEROL: 121 MG/DL (CALC)
NON-HDL CHOLESTEROL: 140 MG/DL (CALC)
POTASSIUM: 4.6 MMOL/L (ref 3.5–5.3)
PROTEIN, TOTAL: 7.1 G/DL (ref 6.1–8.1)
SODIUM: 142 MMOL/L (ref 135–146)
TRIGLYCERIDES: 91 MG/DL

## 2024-10-19 ENCOUNTER — PATIENT MESSAGE (OUTPATIENT)
Dept: FAMILY MEDICINE CLINIC | Facility: CLINIC | Age: 69
End: 2024-10-19

## 2024-10-19 DIAGNOSIS — E78.00 SERUM CHOLESTEROL ELEVATED: Primary | ICD-10-CM

## 2024-10-19 DIAGNOSIS — E78.2 MIXED HYPERLIPIDEMIA: ICD-10-CM

## 2024-10-19 DIAGNOSIS — R73.9 BLOOD GLUCOSE ELEVATED: ICD-10-CM

## 2024-10-19 DIAGNOSIS — R73.03 PREDIABETES: ICD-10-CM

## 2024-10-21 NOTE — TELEPHONE ENCOUNTER
Orders for repeat labs to be done in 3 months entered in [facility] lab system.  Pending physician signature.  Thanks.

## 2024-11-08 ENCOUNTER — PATIENT MESSAGE (OUTPATIENT)
Dept: FAMILY MEDICINE CLINIC | Facility: CLINIC | Age: 69
End: 2024-11-08

## 2024-11-24 DIAGNOSIS — E78.2 MIXED HYPERLIPIDEMIA: ICD-10-CM

## 2024-11-25 RX ORDER — ATORVASTATIN CALCIUM 10 MG/1
10 TABLET, FILM COATED ORAL NIGHTLY
Qty: 90 TABLET | Refills: 0 | Status: SHIPPED | OUTPATIENT
Start: 2024-11-25

## 2024-11-26 NOTE — TELEPHONE ENCOUNTER
Pt requesting refill of   Requested Prescriptions     Pending Prescriptions Disp Refills    atorvastatin 10 MG Oral Tab [Pharmacy Med Name: ATORVASTATIN 10MG TABLETS] 90 tablet 0     Sig: TAKE 1 TABLET BY MOUTH AT NIGHT      Last Time Medication was Filled:  8/17/2023    Last Office Visit with Provider: 7/10/2024    Appt. scheduled on 1/15/2025

## 2024-12-12 LAB
ALBUMIN/GLOBULIN RATIO: 1.7 (CALC) (ref 1–2.5)
ALBUMIN: 4.3 G/DL (ref 3.6–5.1)
ALKALINE PHOSPHATASE: 67 U/L (ref 37–153)
ALT: 15 U/L (ref 6–29)
AST: 17 U/L (ref 10–35)
BILIRUBIN, TOTAL: 0.6 MG/DL (ref 0.2–1.2)
BUN: 15 MG/DL (ref 7–25)
CALCIUM: 9.7 MG/DL (ref 8.6–10.4)
CARBON DIOXIDE: 25 MMOL/L (ref 20–32)
CHLORIDE: 109 MMOL/L (ref 98–110)
CHOL/HDLC RATIO: 2.3 (CALC)
CHOLESTEROL, TOTAL: 166 MG/DL
CREATININE: 0.92 MG/DL (ref 0.5–1.05)
EGFR: 67 ML/MIN/1.73M2
GLOBULIN: 2.5 G/DL (CALC) (ref 1.9–3.7)
GLUCOSE: 93 MG/DL (ref 65–99)
HDL CHOLESTEROL: 71 MG/DL
HEMOGLOBIN A1C: 6.1 % OF TOTAL HGB
LDL-CHOLESTEROL: 81 MG/DL (CALC)
NON-HDL CHOLESTEROL: 95 MG/DL (CALC)
POTASSIUM: 4.6 MMOL/L (ref 3.5–5.3)
PROTEIN, TOTAL: 6.8 G/DL (ref 6.1–8.1)
SODIUM: 142 MMOL/L (ref 135–146)
TRIGLYCERIDES: 65 MG/DL

## 2024-12-21 ENCOUNTER — PATIENT MESSAGE (OUTPATIENT)
Dept: FAMILY MEDICINE CLINIC | Facility: CLINIC | Age: 69
End: 2024-12-21

## 2025-01-08 ENCOUNTER — OFFICE VISIT (OUTPATIENT)
Dept: FAMILY MEDICINE CLINIC | Facility: CLINIC | Age: 70
End: 2025-01-08
Payer: MEDICARE

## 2025-01-08 VITALS
BODY MASS INDEX: 23.97 KG/M2 | HEIGHT: 64 IN | TEMPERATURE: 98 F | DIASTOLIC BLOOD PRESSURE: 64 MMHG | OXYGEN SATURATION: 98 % | RESPIRATION RATE: 18 BRPM | HEART RATE: 70 BPM | WEIGHT: 140.38 LBS | SYSTOLIC BLOOD PRESSURE: 138 MMHG

## 2025-01-08 DIAGNOSIS — R73.03 PREDIABETES: ICD-10-CM

## 2025-01-08 DIAGNOSIS — Z12.31 ENCOUNTER FOR SCREENING MAMMOGRAM FOR MALIGNANT NEOPLASM OF BREAST: ICD-10-CM

## 2025-01-08 DIAGNOSIS — I10 ESSENTIAL HYPERTENSION, BENIGN: Primary | ICD-10-CM

## 2025-01-08 DIAGNOSIS — L84 FOOT CALLUS: ICD-10-CM

## 2025-01-08 DIAGNOSIS — E78.2 MIXED HYPERLIPIDEMIA: ICD-10-CM

## 2025-01-08 DIAGNOSIS — Z23 NEED FOR VACCINATION: ICD-10-CM

## 2025-01-08 DIAGNOSIS — S46.011S TRAUMATIC TEAR OF RIGHT ROTATOR CUFF, UNSPECIFIED TEAR EXTENT, SEQUELA: ICD-10-CM

## 2025-01-08 PROCEDURE — 3008F BODY MASS INDEX DOCD: CPT | Performed by: FAMILY MEDICINE

## 2025-01-08 PROCEDURE — 1160F RVW MEDS BY RX/DR IN RCRD: CPT | Performed by: FAMILY MEDICINE

## 2025-01-08 PROCEDURE — 99214 OFFICE O/P EST MOD 30 MIN: CPT | Performed by: FAMILY MEDICINE

## 2025-01-08 PROCEDURE — 1170F FXNL STATUS ASSESSED: CPT | Performed by: FAMILY MEDICINE

## 2025-01-08 PROCEDURE — 1159F MED LIST DOCD IN RCRD: CPT | Performed by: FAMILY MEDICINE

## 2025-01-08 PROCEDURE — G2211 COMPLEX E/M VISIT ADD ON: HCPCS | Performed by: FAMILY MEDICINE

## 2025-01-08 PROCEDURE — 3075F SYST BP GE 130 - 139MM HG: CPT | Performed by: FAMILY MEDICINE

## 2025-01-08 PROCEDURE — 3078F DIAST BP <80 MM HG: CPT | Performed by: FAMILY MEDICINE

## 2025-01-08 RX ORDER — MELOXICAM 7.5 MG/1
7.5 TABLET ORAL
Qty: 30 TABLET | Refills: 1 | Status: SHIPPED | OUTPATIENT
Start: 2025-01-08

## 2025-01-08 RX ORDER — METOPROLOL SUCCINATE 50 MG/1
50 TABLET, EXTENDED RELEASE ORAL DAILY
Qty: 90 TABLET | Refills: 0 | Status: SHIPPED | OUTPATIENT
Start: 2025-01-08

## 2025-01-08 RX ORDER — METFORMIN HYDROCHLORIDE 500 MG/1
1000 TABLET, EXTENDED RELEASE ORAL
Qty: 180 TABLET | Refills: 1 | Status: SHIPPED | OUTPATIENT
Start: 2025-01-08

## 2025-01-09 NOTE — PROGRESS NOTES
CHIEF COMPLAINT:   Chief Complaint   Patient presents with    Referral     Clavical pain          HPI:     Ese Rodriguez is a 69 year old female presents for HTN, HL, prediaebtes, and shoulder pain.    Prediabetes f-u:  12/2024 A1c is 6.1. She is taking Metformin  mg daily now, tolerating well.  Has no polyuria and no polydipsia.      Previous HPI: Last A1c in 3/2023 was 6.2. She was started on Metformin 500 mg QAM. She has not been tolerating this medication well, due to stomach upset and diarrhea.  She admits to only taking this 1-2x per week, since she needs to be a near a bathroom after taking.  She has no polyuria and no polydipsia. She is due to recheck her labs today.     Previous HPI: Last A1c in 4/2022 was 6.0. She feels well- has no polyuria and no polydipsia. She managed this with diet and staying active.     Previous HPI: A1c from 4/2022 is well-controlled at 6.0.  She has been managing this with diet and lifestyle changes.  She has no polyuria and no polydipsia symptoms. She feels well.      Previous HPI from 6/2022; 4/2022 A1c is 6.0.  She has lost some weight due to stress in her life.  She is working a on healthy diet.  She has no sx of polyuria and no polydipsia.      Previous HPI from 4/2022: Most recent A1c in 4/2022 is 6.0.  She has been working on a healthy diet.  She has no polyuria and no polydipsia.      Previous HPI from 3/2022: She as noted on 2018 labs to have an A1c of 5.8.  She is overdue to recheck. She has no polyuria and no polydipsia. She is not on any medication for this.      HTN f-u: She is taking Metoprolol without any issues.  Has been off of Amlodipine for \"awhile.\" BP is normal today- she has no CP, SOB, HA, palpitations, and no leg swelling.     Previous HPI from 4/2022: BP is stable today.  She is currently taking Metoprolol and Amlodipine as prescribed. Tolerating well. She has no HA, no CP, no palpitations, no leg swelling.     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 She went to the ED and BP was in the 200s. She was released from the ED at the time since BP came down on its own. It was not until a few years later she was started on BP lowering medication. She is currently on Metoprolol and tolerating this well.  She has no HA, no change in vision, no CP, palpitations, no leg swelling. She tries to avoid salt in her diet, but loves to eat. 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 She states she had a TIA about 10 yrs ago- had blurry vision while driving, lasted about 5 minutes then resolved. After workup and eval, she was told it was likely a TIA.      HL: She is currently taking Atorvastatin 10 mg, tolerating well. She has no muscle aches or pains    Shoulder pain: pt has a h/o rotator cuff injury/tear from  ,injured while lifting a heavy pt.  At the time it was a workman's comp case, she went through therapy, was no light duty, and has not gotten it repaired per Ortho recommendations.  She has a difficult time sleeping, doesn't matter what position.  The pain is sharp, especially with sudden movements.  Pain is worse with lifting, is ok at rest. She ist aking Ibuprofen for this, but not providing relief.                HISTORY:  Past Medical History:    Allergic rhinitis    Sinus allergies for years    Back pain    Decorative tattoo    Diabetes (HCC)    Test requested by PCP    Essential hypertension    Hyperlipidemia    Over 15 years.    Sleep apnea    Type 1 diabetes mellitus (HCC)    Wears glasses      Past Surgical History:   Procedure Laterality Date    Colectomy      Colon surgery      Colonoscopy  Oct 2000            Family History   Problem Relation Age of Onset    Diabetes Father     Hypertension Father     Heart Attack Father     Heart  Disorder Father     Hypertension Brother     Heart Attack Brother     Stroke Brother     Diabetes Maternal Aunt       Social History:   Social History     Socioeconomic History    Marital status:    Tobacco Use    Smoking status: Never    Smokeless tobacco: Never   Vaping Use    Vaping status: Never Used   Substance and Sexual Activity    Alcohol use: Never    Drug use: Never   Other Topics Concern    Caffeine Concern No    Exercise Yes     Comment: Need to do more.  Not regular    Seat Belt Yes    Special Diet No    Stress Concern No    Weight Concern Yes        Medications (Active prior to today's visit):  Current Outpatient Medications   Medication Sig Dispense Refill    metFORMIN  MG Oral Tablet 24 Hr Take 2 tablets (1,000 mg total) by mouth daily with breakfast. 180 tablet 1    metoprolol succinate ER 50 MG Oral Tablet 24 Hr Take 1 tablet (50 mg total) by mouth daily. 90 tablet 0    Meloxicam 7.5 MG Oral Tab Take 1 tablet (7.5 mg total) by mouth daily with food. 30 tablet 1    atorvastatin 10 MG Oral Tab TAKE 1 TABLET BY MOUTH AT NIGHT 90 tablet 0    Scopolamine 1.5mg TD patch 1mg/3days Place 1 patch onto the skin every third day. 10 patch 1    Multiple Vitamin (TAB-A-KALPANA) Oral Tab Take 1 tablet by mouth daily.      Olive Leaf Extract 500 MG Oral Cap Take 500 mg by mouth daily.   (Patient not taking: Reported on 1/8/2025)         Allergies:  Allergies[1]    PSFH elements reviewed from today and agreed except as otherwise stated in HPI.  ROS:     Review of Systems   Constitutional:  Negative for appetite change and fatigue.   Cardiovascular:  Negative for chest pain, palpitations and leg swelling.   Gastrointestinal:  Negative for abdominal pain, constipation, diarrhea, nausea and vomiting.   Endocrine: Negative for polydipsia and polyuria.   Musculoskeletal:  Positive for arthralgias. Negative for joint swelling and myalgias.         Pertinent positives and negatives noted in the the  HPI.    PHYSICAL EXAM:   /64 (BP Location: Left arm, Patient Position: Sitting, Cuff Size: adult)   Pulse 70   Temp 97.5 °F (36.4 °C) (Temporal)   Resp 18   Ht 5' 4\" (1.626 m)   Wt 140 lb 6.4 oz (63.7 kg)   LMP  (LMP Unknown)   SpO2 98%   BMI 24.10 kg/m²   Vital signs reviewed.Appears stated age, well groomed.  Physical Exam  Vitals reviewed.   Constitutional:       General: She is not in acute distress.     Appearance: Normal appearance.   HENT:      Head: Normocephalic.   Cardiovascular:      Rate and Rhythm: Normal rate and regular rhythm.      Pulses: Normal pulses.      Heart sounds: Normal heart sounds.   Pulmonary:      Effort: Pulmonary effort is normal. No respiratory distress.      Breath sounds: Normal breath sounds.   Abdominal:      General: Bowel sounds are normal. There is no distension.      Palpations: Abdomen is soft.      Tenderness: There is no guarding.      Comments: No HSM   Musculoskeletal:         General: Signs of injury present. No swelling.      Right lower leg: No edema.      Left lower leg: No edema.      Comments: Decreased ROM of RUE, bilateral  strength is 5/5   Skin:     General: Skin is warm and dry.   Neurological:      Mental Status: She is alert and oriented to person, place, and time.   Psychiatric:         Behavior: Behavior normal.          LABS     No visits with results within 2 Month(s) from this visit.   Latest known visit with results is:   Patient Message on 10/19/2024   Component Date Value    CHOLESTEROL, TOTAL 12/11/2024 166     HDL CHOLESTEROL 12/11/2024 71     TRIGLYCERIDES 12/11/2024 65     LDL-CHOLESTEROL 12/11/2024 81     CHOL/HDLC RATIO 12/11/2024 2.3     NON-HDL CHOLESTEROL 12/11/2024 95     HEMOGLOBIN A1c 12/11/2024 6.1 (H)     GLUCOSE 12/11/2024 93     UREA NITROGEN (BUN) 12/11/2024 15     CREATININE 12/11/2024 0.92     EGFR 12/11/2024 67     BUN/CREATININE RATIO 12/11/2024 SEE NOTE:     SODIUM 12/11/2024 142     POTASSIUM 12/11/2024 4.6      CHLORIDE 12/11/2024 109     CARBON DIOXIDE 12/11/2024 25     CALCIUM 12/11/2024 9.7     PROTEIN, TOTAL 12/11/2024 6.8     ALBUMIN 12/11/2024 4.3     GLOBULIN 12/11/2024 2.5     ALBUMIN/GLOBULIN RATIO 12/11/2024 1.7     BILIRUBIN, TOTAL 12/11/2024 0.6     ALKALINE PHOSPHATASE 12/11/2024 67     AST 12/11/2024 17     ALT 12/11/2024 15       REVIEWED THIS VISIT  ASSESSMENT/PLAN:   69 year old female with    1. Essential hypertension, benign  - BP stable today  - Lytes and Cr are normal on 12/2024 labs, due to recheck today  - cont Metoprolol  - d/w pt a healthy, low-sodium diet, regular exercise, and wt loss  - RTC in 6 months for f-u     - COMP METABOLIC PANEL [21348] [Q]; Future  - LIPID PANEL [7600] [Q]; Future  - metoprolol succinate ER 50 MG Oral Tablet 24 Hr; Take 1 tablet (50 mg total) by mouth daily.  Dispense: 90 tablet; Refill: 0  - COMP METABOLIC PANEL [57241] [Q]  - LIPID PANEL [7600] [Q]    2. Mixed hyperlipidemia  - 12/2024 lipids normal, due to recheck in 10/2024  - cont Atorvastatin 10 mg daily  - d/w pt a healthy, low-fat/low-cholesterol diet, regular exercise, and wt loss    - COMP METABOLIC PANEL [23051] [Q]; Future  - LIPID PANEL [7600] [Q]; Future  - COMP METABOLIC PANEL [81957] [Q]  - LIPID PANEL [7600] [Q]    3. Prediabetes  - 12/2024 A1c is 6.1 is taking Metformin  mg daily  - she is amenable to increase to 1000 mg daily,   - f-u in 6 months  - COMP METABOLIC PANEL [67134] [Q]; Future  - HGB A1C [496] [Q]; Future  - metFORMIN  MG Oral Tablet 24 Hr; Take 2 tablets (1,000 mg total) by mouth daily with breakfast.  Dispense: 180 tablet; Refill: 1  - COMP METABOLIC PANEL [87538] [Q]  - HGB A1C [496] [Q]    4. Traumatic tear of right rotator cuff, unspecified tear extent, sequela  - previous work injury in 2015, completed PT, but no surgery as she was recommended  - START Meloxicam, R/B/SE of new med d/ wpt  - referral to Ortho, will defer imaging to Ortho    - Meloxicam 7.5 MG Oral Tab;  Take 1 tablet (7.5 mg total) by mouth daily with food.  Dispense: 30 tablet; Refill: 1  - Ortho Referral - In Network    5. Foot callus  - requesting referral to go back to see Dr. Sales for callus on right foot    - Podiatry Referral - In Network    6. Need for vaccination    - Influenza Refused    7. Encounter for screening mammogram for malignant neoplasm of breast    - [facility] LAURIE 2D+3D SCREENING BILAT (CPT=77067/69680); Future      Meds This Visit:  Requested Prescriptions     Signed Prescriptions Disp Refills    metFORMIN  MG Oral Tablet 24 Hr 180 tablet 1     Sig: Take 2 tablets (1,000 mg total) by mouth daily with breakfast.    metoprolol succinate ER 50 MG Oral Tablet 24 Hr 90 tablet 0     Sig: Take 1 tablet (50 mg total) by mouth daily.    Meloxicam 7.5 MG Oral Tab 30 tablet 1     Sig: Take 1 tablet (7.5 mg total) by mouth daily with food.       Health Maintenance:  Health Maintenance   Topic Date Due    Mammogram  12/20/2024    Annual Well Visit  01/01/2025    Pneumococcal Vaccine: 50+ Years (1 of 1 - PCV) 02/08/2025 (Originally 10/22/2005)    Zoster Vaccines (2 of 3) 02/08/2025 (Originally 8/12/2016)    COVID-19 Vaccine (3 - 2024-25 season) 02/08/2025 (Originally 9/1/2024)    Influenza Vaccine (1) 06/30/2025 (Originally 10/1/2024)    Colorectal Cancer Screening  07/23/2026    DEXA Scan  Completed    Annual Depression Screening  Completed    Fall Risk Screening (Annual)  Completed    Meningococcal B Vaccine  Aged Out         Patient/Caregiver Education: There are no barriers to learning. Medical education done.   Outcome: Patient verbalizes understanding and agrees with plan. Advised to call or RTC if symptoms persist or worsen.    Problem List:     Patient Active Problem List   Diagnosis    Sciatica of left side    Lumbar radiculopathy    Age-related nuclear cataract of both eyes    Carpal tunnel syndrome of left wrist    Essential hypertension, benign    Family history of early CAD    Herpes  genitalis    Parotid mass    Prediabetes    Mixed hyperlipidemia    TIA (transient ischemic attack)    Special screening for malignant neoplasms, colon    Benign neoplasm of transverse colon    Osteopenia of neck of femur       Imaging & Referrals:  ORTHOPEDIC - INTERNAL  PODIATRY - INTERNAL  INFLUENZA REFUSED [facility] LAURIE 2D+3D SCREENING BILAT (CPT=77067/48905)     1/9/2025  Lila Ladd MD      Patient understands plan and follow-up.  Return in about 6 months (around 7/8/2025) for annual physical, prediabetes, Cholesterol, HTN in 5-6 months.              [1]   Allergies  Allergen Reactions    Influenza Vaccine Live RASH     Rash and fatigue after first flu vaccine    Penicillins RASH and OTHER (SEE COMMENTS)     states she has never had it so \"doctor won't give it to me at an older age\"

## 2025-01-11 ENCOUNTER — PATIENT MESSAGE (OUTPATIENT)
Dept: FAMILY MEDICINE CLINIC | Facility: CLINIC | Age: 70
End: 2025-01-11

## 2025-01-11 DIAGNOSIS — E78.2 MIXED HYPERLIPIDEMIA: ICD-10-CM

## 2025-01-13 RX ORDER — ATORVASTATIN CALCIUM 20 MG/1
20 TABLET, FILM COATED ORAL NIGHTLY
Qty: 90 TABLET | Refills: 1 | Status: SHIPPED | OUTPATIENT
Start: 2025-01-13

## 2025-01-13 RX ORDER — ATORVASTATIN CALCIUM 10 MG/1
10 TABLET, FILM COATED ORAL NIGHTLY
Qty: 90 TABLET | Refills: 0 | OUTPATIENT
Start: 2025-01-13

## 2025-01-13 NOTE — TELEPHONE ENCOUNTER
Requested Prescriptions     Pending Prescriptions Disp Refills    atorvastatin 10 MG Oral Tab 90 tablet 0     Sig: Take 1 tablet (10 mg total) by mouth nightly.      The original prescription was discontinued on 1/13/2025 by Lila Ladd MD. Renewing this prescription may not be appropriate.

## 2025-01-13 NOTE — TELEPHONE ENCOUNTER
TCB  Atorvastatin was dispensed on 11/25/24  #90  Increased from 10 mg to 20 mg daily on 10/16/24  Refill pending physician signature.

## 2025-01-31 ENCOUNTER — TELEPHONE (OUTPATIENT)
Dept: FAMILY MEDICINE CLINIC | Facility: CLINIC | Age: 70
End: 2025-01-31

## 2025-01-31 NOTE — TELEPHONE ENCOUNTER
Unable to reach patient for medication adherence consult. LVM for patient to call me back at [phone].

## 2025-02-10 ENCOUNTER — TELEPHONE (OUTPATIENT)
Dept: ORTHOPEDICS CLINIC | Facility: CLINIC | Age: 70
End: 2025-02-10

## 2025-02-10 DIAGNOSIS — M25.511 RIGHT SHOULDER PAIN, UNSPECIFIED CHRONICITY: Primary | ICD-10-CM

## 2025-02-10 NOTE — TELEPHONE ENCOUNTER
Patient is scheduled for right rotator cuff. No xrays in Epic. Please advise if imaging is needed.  Future Appointments   Date Time Provider Department Center   2/19/2025  3:40 PM [id number]

## 2025-02-19 ENCOUNTER — HOSPITAL ENCOUNTER (OUTPATIENT)
Dept: GENERAL RADIOLOGY | Age: 70
Discharge: HOME | End: 2025-02-19
Attending: ORTHOPAEDIC SURGERY
Payer: MEDICARE

## 2025-02-19 ENCOUNTER — OFFICE VISIT (OUTPATIENT)
Dept: ORTHOPEDICS CLINIC | Facility: CLINIC | Age: 70
End: 2025-02-19
Payer: MEDICARE

## 2025-02-19 VITALS — BODY MASS INDEX: 23.9 KG/M2 | HEIGHT: 64 IN | WEIGHT: 140 LBS

## 2025-02-19 DIAGNOSIS — M25.811 IMPINGEMENT OF RIGHT SHOULDER: Primary | ICD-10-CM

## 2025-02-19 DIAGNOSIS — M19.011 PRIMARY OSTEOARTHRITIS OF RIGHT SHOULDER: ICD-10-CM

## 2025-02-19 DIAGNOSIS — M75.81 TENDINITIS OF RIGHT ROTATOR CUFF: ICD-10-CM

## 2025-02-19 DIAGNOSIS — M25.511 RIGHT SHOULDER PAIN, UNSPECIFIED CHRONICITY: ICD-10-CM

## 2025-02-19 PROCEDURE — 3008F BODY MASS INDEX DOCD: CPT | Performed by: ORTHOPAEDIC SURGERY

## 2025-02-19 PROCEDURE — 1160F RVW MEDS BY RX/DR IN RCRD: CPT | Performed by: ORTHOPAEDIC SURGERY

## 2025-02-19 PROCEDURE — 73030 X-RAY EXAM OF SHOULDER: CPT | Performed by: ORTHOPAEDIC SURGERY

## 2025-02-19 PROCEDURE — 99204 OFFICE O/P NEW MOD 45 MIN: CPT | Performed by: ORTHOPAEDIC SURGERY

## 2025-02-19 PROCEDURE — 1159F MED LIST DOCD IN RCRD: CPT | Performed by: ORTHOPAEDIC SURGERY

## 2025-02-19 NOTE — PROGRESS NOTES
[facility] New Consult      Chief Complaint   Patient presents with    Shoulder Pain     RIGHT SHOULDER  -Hx of rotator cuff tear about 15 years   -Completed physical therapy and received steroid injections with improvement  -Pain started four months ago and is progressively getting worse      HPI: The patient is a 69 year old female referred for orthopaedic consultation by Dr. Lila Ladd with complaints of chronic right shoulder pain.  She has noted increasing pain, limited motion and weakness about the right shoulder over the past 4 months.  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  Treatments at that time included steroid injections, physical therapy but no surgery.  Her current pain is localized anterior laterally extending down the deltoid to mid humeral level.  Activities of daily living, reaching and sleeping on the right side has been problematic.  She wonders if chiropractic treatments would be beneficial.    Past Medical History:    Allergic rhinitis    Sinus allergies for years    Back pain    Decorative tattoo    Diabetes (HCC)    Test requested by PCP    Essential hypertension    Hyperlipidemia    Over 15 years.    Sleep apnea    Type 1 diabetes mellitus (HCC)    Wears glasses     Past Surgical History:   Procedure Laterality Date    Colectomy      Colon surgery      Colonoscopy  Oct 2000           Current Outpatient Medications   Medication Sig Dispense Refill    atorvastatin 20 MG Oral Tab Take 1 tablet (20 mg total) by mouth nightly. 90 tablet 1    metFORMIN  MG Oral Tablet 24 Hr Take 2 tablets (1,000 mg total) by mouth daily with breakfast. 180 tablet 1    metoprolol succinate ER 50 MG Oral Tablet 24 Hr Take 1 tablet (50 mg total) by mouth daily. 90 tablet 0    Scopolamine 1.5mg TD patch 1mg/3days Place 1 patch onto the skin  every third day. 10 patch 1    Multiple Vitamin (TAB-A-KALPANA) Oral Tab Take 1 tablet by mouth daily.      Meloxicam 7.5 MG Oral Tab Take 1 tablet (7.5 mg total) by mouth daily with food. (Patient not taking: Reported on 2/19/2025) 30 tablet 1    Olive Leaf Extract 500 MG Oral Cap Take 500 mg by mouth daily.   (Patient not taking: Reported on 2/19/2025)       Allergies[1]  Family History   Problem Relation Age of Onset    Diabetes Father     Hypertension Father     Heart Attack Father     Heart Disorder Father     Hypertension Brother     Heart Attack Brother     Stroke Brother     Diabetes Maternal Aunt      Social History     Occupational History    Not on file   Tobacco Use    Smoking status: Never    Smokeless tobacco: Never   Vaping Use    Vaping status: Never Used   Substance and Sexual Activity    Alcohol use: Never    Drug use: Never    Sexual activity: Not on file        ROS:  Complete ROS reviewed by me and non-contributory to the chief complaint except as mentioned above.    Physical Exam:    Ht 5' 4\" (1.626 m)   Wt 140 lb (63.5 kg)   LMP  (LMP Unknown)   BMI 24.03 kg/m²   Constitutional: Well developed, well nourished 69 year old female  Psychological: NAD, alert and appropriate  Respiratory: Breathing comfortably on room air with RR of 10-14  Cardiac: Palpable distal pulses with pink warm extremities distally  Examination of the C-spine reveals no palpable tenderness and adequate active range of motion with minimal complaint.  Right shoulder reveals no visible swelling, discoloration or deformity.  Active range of motion generates pain through the impingement zone on forward elevation to approximately 150 degrees.  Similar findings are noted on abduction through the impingement zone to 125.  External rotation is symmetrical at about 60 degrees bilaterally with internal rotation above the belt line.  Block is painful anterior laterally with moderate pain on Speed's test and Caballo's test.  Biceps  groove is mildly tender.  AC joint is mildly prominent and mildly tender with adequate tolerance of crossarm adduction.  Rotator cuff strength is weaker and painful on the affected side on empty can and external rotation strength testing.  No instability on sulcus or load-and-shift.  Hand, wrist and elbow range of motion is within acceptable limits.  Neurovascular status is intact on sensory, motor and perfusion assessment distally.    Imaging: Multiple views right shoulder personally viewed, demonstrating no acute fracture dislocation or late glenohumeral degenerative changes.  Sclerotic changes are seen at the greater tuberosity suggestive of chronic rotator cuff tendinopathy.  Mild degenerative changes seen at the AC joint.    XR SHOULDER, COMPLETE (MIN 2 VIEWS), RIGHT (CPT=73030)    Result Date: 2/19/2025  CONCLUSION:  See above.   LOCATION:  [facility]   Dictated by (CST): Catracho Newsome MD on 2/19/2025 at 9:30 AM     Finalized by (CST): Catracho Newsome MD on 2/19/2025 at 9:31 AM          Assessment/Diagnoses:  Diagnoses and all orders for this visit:    Impingement of right shoulder    Tendinitis of right rotator cuff    Primary osteoarthritis of right shoulder        Plan:  I reviewed imaging and exam findings with the patient.  Symptoms are consistent with chronic recurrent rotator cuff tendinitis and impingement.  Referencing the patient's imaging studies, I explained the diagnosis, etiology and natural history of rotator cuff tendinitis and impingement.  We discussed initial treatment options including conservative care through activity modification, anti-inflammatory use, physical therapy and possible injections.  Given the duration of symptoms, weakness on exam, nocturnal pain and impact on the patient's activities of daily living, we discussed the option for further imaging with an MRI.  She also expresses a history of previous rotator cuff abnormalities, possibly a small tear.  I therefore recommend advanced  imaging for further assessment.  The patient is interested in proceeding as recommended.  For now, I advised protection of the shoulder from any pushing, pulling, lifting or loading activities.  All questions were answered and the patient verbalized agreement.  Follow-up after test results become available.      Pao Coombs MD, FAAOS  Orthopaedic Surgery   Sports Medicine/Knee and Shoulder  [facility]  t: [phone]  f: [phone]               This document was partially prepared using Dragon Medical voice recognition software.  Although every attempt is made to correct errors during dictation, discrepancies may still exist.               [1]   Allergies  Allergen Reactions    Influenza Vaccine Live RASH     Rash and fatigue after first flu vaccine    Penicillins RASH and OTHER (SEE COMMENTS)     states she has never had it so \"doctor won't give it to me at an older age\"

## 2025-03-06 DIAGNOSIS — E78.2 MIXED HYPERLIPIDEMIA: ICD-10-CM

## 2025-03-31 RX ORDER — ATORVASTATIN CALCIUM 10 MG/1
10 TABLET, FILM COATED ORAL NIGHTLY
Qty: 90 TABLET | Refills: 0 | OUTPATIENT
Start: 2025-03-31

## 2025-03-31 NOTE — TELEPHONE ENCOUNTER
Refill(s) Requested:   Requested Prescriptions     Pending Prescriptions Disp Refills    ATORVASTATIN 10 MG Oral Tab [Pharmacy Med Name: ATORVASTATIN 10MG TABLETS] 90 tablet 0     Sig: TAKE 1 TABLET BY MOUTH AT NIGHT      Has dose or medication been changed    since last prescription? *Review notes*    [x]  Yes       []  No     Last office visit: 1/8/2025 (in-office)  Visit date not found (virtual visit)   Action taken:  [] The original prescription was discontinued on 1/13/2025 by Lila Ladd MD

## 2025-04-17 DIAGNOSIS — I10 ESSENTIAL HYPERTENSION, BENIGN: ICD-10-CM

## 2025-04-18 ENCOUNTER — HOSPITAL ENCOUNTER (OUTPATIENT)
Dept: MRI IMAGING | Facility: HOSPITAL | Age: 70
Discharge: HOME | End: 2025-04-18
Attending: ORTHOPAEDIC SURGERY
Payer: MEDICARE

## 2025-04-18 DIAGNOSIS — M19.011 PRIMARY OSTEOARTHRITIS OF RIGHT SHOULDER: ICD-10-CM

## 2025-04-18 DIAGNOSIS — M25.811 IMPINGEMENT OF RIGHT SHOULDER: ICD-10-CM

## 2025-04-18 PROCEDURE — 73221 MRI JOINT UPR EXTREM W/O DYE: CPT | Performed by: ORTHOPAEDIC SURGERY

## 2025-04-18 RX ORDER — METOPROLOL SUCCINATE 50 MG/1
50 TABLET, EXTENDED RELEASE ORAL DAILY
Qty: 90 TABLET | Refills: 0 | Status: SHIPPED | OUTPATIENT
Start: 2025-04-18 | End: 2025-04-21

## 2025-04-18 NOTE — TELEPHONE ENCOUNTER
Requested Prescriptions     Pending Prescriptions Disp Refills    metoprolol succinate ER 50 MG Oral Tablet 24 Hr 90 tablet 0     Sig: Take 1 tablet (50 mg total) by mouth daily.     LOV 1/8/2025 -  1. Essential hypertension, benign  - BP stable today  - Lytes and Cr are normal on 12/2024 labs, due to recheck today  - cont Metoprolol  - d/w pt a healthy, low-sodium diet, regular exercise, and wt loss  - RTC in 6 months for f-u    Patient was asked to follow-up in: 6 months    Appointment scheduled: 6/11/2025 Lila Ladd MD     Medication refilled per protocol.

## 2025-04-21 DIAGNOSIS — I10 ESSENTIAL HYPERTENSION, BENIGN: ICD-10-CM

## 2025-04-21 RX ORDER — METOPROLOL SUCCINATE 50 MG/1
50 TABLET, EXTENDED RELEASE ORAL DAILY
Qty: 90 TABLET | Refills: 0 | Status: SHIPPED | OUTPATIENT
Start: 2025-04-21

## 2025-04-21 NOTE — TELEPHONE ENCOUNTER
Refill(s) Requested:   Requested Prescriptions     Pending Prescriptions Disp Refills    metoprolol succinate ER 50 MG Oral Tablet 24 Hr 90 tablet 0     Sig: Take 1 tablet (50 mg total) by mouth daily.     Medication Last Prescribed:  4/18/2025 90 bowers 0 refills     Has dose or medication been changed    since last prescription? *Review notes*    []  Yes       [x]  No     Last office visit: 1/8/2025 (in-office)  Visit date not found (virtual visit)     Relevant details from LOV note: Essential hypertension, benign  - BP stable today  - Lytes and Cr are normal on 12/2024 labs, due to recheck today  - cont Metoprolol  - d/w pt a healthy, low-sodium diet, regular exercise, and wt loss  - RTC in 6 months for f-u     Relevant lab results: N/A    Patient was asked to follow-up in: Return in about 6 months (around 7/8/2025) for annual physical, prediabetes, Cholesterol, HTN in 5-6 months.     Appointment due: July 2025    Future Appointments: 6/11/2025 Lila Ladd MD     Action taken:  [x] Medication refilled per protocol

## 2025-05-16 DIAGNOSIS — E78.2 MIXED HYPERLIPIDEMIA: Primary | ICD-10-CM

## 2025-05-16 NOTE — TELEPHONE ENCOUNTER
Medication: atorvastatin    Dose:  20 MG Oral Tab     Times per day: Take 1 tablet (20 mg total) by mouth nightly.     Last office visit:  1-8-25  Due back to office:  6 months (around 7/8/2025) for annual physical, prediabetes, Cholesterol, HTN in 5-6 months.   Future office visit:  6-11-15  Has this been refilled since last office visit:  1-13-25    Pharmacy:  [id number] - [place], [place] - [address] [phone], [phone] [[id number]]

## 2025-05-19 RX ORDER — ATORVASTATIN CALCIUM 20 MG/1
20 TABLET, FILM COATED ORAL NIGHTLY
Qty: 90 TABLET | Refills: 0 | OUTPATIENT
Start: 2025-05-19

## 2025-05-19 NOTE — TELEPHONE ENCOUNTER
Refill Request Action taken:  [x] Medication not refilled; Pt. states she just picked up refill last week. No refills needed at this time.    Refill(s) Requested:   Requested Prescriptions     Pending Prescriptions Disp Refills    atorvastatin 20 MG Oral Tab 90 tablet 1     Sig: Take 1 tablet (20 mg total) by mouth nightly.     Medication Last Prescribed:  01/13/25- 90 days with 1 refill- Pt. states she just picked up refill last week. No refills needed at this time.     Has dose or medication been changed    since last prescription? *Review notes*    []  Yes       [x]  No     Last office visit: 1/8/2025 (in-office)  Visit date not found (virtual visit)     Relevant details from LOV note:   2. Mixed hyperlipidemia  - 12/2024 lipids normal, due to recheck in 10/2024  - cont Atorvastatin 10 mg daily  - d/w pt a healthy, low-fat/low-cholesterol diet, regular exercise, and wt loss  Return in about 6 months (around 7/8/2025) for annual physical, prediabetes, Cholesterol, HTN in 5-6 months.     Patient was asked to follow-up in: 6 months    Appointment due: July 2025    Future Appointments: 6/11/2025 Lila Ladd MD

## 2025-05-21 ENCOUNTER — TELEPHONE (OUTPATIENT)
Dept: ORTHOPEDICS CLINIC | Facility: CLINIC | Age: 70
End: 2025-05-21

## 2025-05-21 ENCOUNTER — OFFICE VISIT (OUTPATIENT)
Dept: ORTHOPEDICS CLINIC | Facility: CLINIC | Age: 70
End: 2025-05-21
Payer: MEDICARE

## 2025-05-21 VITALS — BODY MASS INDEX: 23.9 KG/M2 | HEIGHT: 64 IN | WEIGHT: 140 LBS

## 2025-05-21 DIAGNOSIS — M75.41 IMPINGEMENT SYNDROME OF RIGHT SHOULDER: ICD-10-CM

## 2025-05-21 DIAGNOSIS — M75.121 NONTRAUMATIC COMPLETE TEAR OF RIGHT ROTATOR CUFF: Primary | ICD-10-CM

## 2025-05-21 DIAGNOSIS — M19.011 ARTHRITIS OF RIGHT ACROMIOCLAVICULAR JOINT: ICD-10-CM

## 2025-05-21 DIAGNOSIS — M75.21 BICEPS TENDINITIS OF RIGHT SHOULDER: ICD-10-CM

## 2025-05-21 DIAGNOSIS — S43.439A DEGENERATIVE SUPERIOR LABRAL ANTERIOR-TO-POSTERIOR (SLAP) TEAR OF SHOULDER: ICD-10-CM

## 2025-05-21 PROCEDURE — 1159F MED LIST DOCD IN RCRD: CPT | Performed by: ORTHOPAEDIC SURGERY

## 2025-05-21 PROCEDURE — 3008F BODY MASS INDEX DOCD: CPT | Performed by: ORTHOPAEDIC SURGERY

## 2025-05-21 PROCEDURE — 1125F AMNT PAIN NOTED PAIN PRSNT: CPT | Performed by: ORTHOPAEDIC SURGERY

## 2025-05-21 PROCEDURE — 1160F RVW MEDS BY RX/DR IN RCRD: CPT | Performed by: ORTHOPAEDIC SURGERY

## 2025-05-21 PROCEDURE — 99215 OFFICE O/P EST HI 40 MIN: CPT | Performed by: ORTHOPAEDIC SURGERY

## 2025-05-21 NOTE — PROGRESS NOTES
[facility] Follow-up Note      Chief Complaint   Patient presents with    Follow - Up     RIGHT SHOULDER MRI RESULTS     HPI: The patient is a 69 year old female returning for orthopaedic reassessment of the right shoulder.  She has a history of previously identified rotator cuff tear treated nonsurgically greater than 10 years ago.  Findings at the last visit were concerning for progressive rotator cuff involvement and an MRI is now available for our review.  The patient reports constant pain, weakness and difficulty sleeping.  Ibuprofen and Meloxicam helped at first but didn't last long, so she discontinued concerned about side effects.      Past Medical History[1]  Past Surgical History[2]  Current Medications[3]  Allergies[4]  Family History[5]  Social History     Occupational History    Not on file   Tobacco Use    Smoking status: Never    Smokeless tobacco: Never   Vaping Use    Vaping status: Never Used   Substance and Sexual Activity    Alcohol use: Never    Drug use: Never    Sexual activity: Not on file        ROS:  Complete ROS reviewed by me and non-contributory to the chief complaint except as mentioned above.    Physical Exam:    Ht 5' 4\" (1.626 m)   Wt 140 lb (63.5 kg)   LMP  (LMP Unknown)   BMI 24.03 kg/m²   Right shoulder:  Inspection reveals no apparent discoloration, swelling or deformity.  Palpation reveals tenderness over the lateral acromion.  AC joint is tender to palpation.  Range of motion reveals forward elevation to 120, passively to 150 degrees, abduction to 125 and active external rotation to 60 degrees.  ER strength is relatively symmetrical with associated pain on resistance testing.  Empty can test is not only painful but also weaker on the affected side.  Hawkin's and Speed's are poorly tolerated.  There is no obvious instability on sulcus or load-and-shift.  Hand, wrist and elbow range of motion is within normal limits.  Neurovascular status is intact on  sensory, motor and perfusion assessment distally.    Imaging: MRI right shoulder personally viewed, independently interpreted and radiology report read.  A large retracted full-thickness rotator cuff tear measuring approximately 3 x 3.5 cm is noted.  Mild atrophy is seen at the supraspinatus and infraspinatus.  AC joint arthropathy with a type II-III acromial configuration is seen overlying subdeltoid fluid.  Tendinopathy of the long head of the biceps is noted with degenerative superior labral changes.  A mild glenohumeral chondromalacia is noted.    MRI SHOULDER, RIGHT (CPT=73221)  Result Date: 4/21/2025  CONCLUSION:  1. Full-thickness rotator cuff tear involving the entirety of the supraspinatus tendon and extending into the anterior fibers of the infraspinatus tendon.  The tear measures approximately 3.4 x 2.9 cm.  There is associated mild atrophy of the supraspinatus and infraspinatus muscles. 2. Moderate AC joint arthropathy with hypertrophy and capsulitis.  There is a type 2 acromion and mild subacromial enthesopathy suggestive of extrinsic rotator cuff impingement. 3. Moderate to severe tendinopathy involving the intra-articular portion of the long head of biceps tendon. 4. Severe degenerative changes of the biceps labral anchor, with less pronounced global labral degeneration also noted. 5. There is glenohumeral chondromalacia.   LOCATION:  [facility]   Dictated by (CST): Alda Lynn DO on 4/21/2025 at 7:38 AM     Finalized by (CST): Alda Lynn DO on 4/21/2025 at 7:42 AM         Assessment/Diagnoses:  Diagnoses and all orders for this visit:    Nontraumatic complete tear of right rotator cuff    Arthritis of right acromioclavicular joint    Impingement syndrome of right shoulder    Degenerative superior labral anterior-to-posterior (SLAP) tear of shoulder    Biceps tendinitis of right shoulder      Plan:  I reviewed imaging and exam findings with the patient.  There is a full-thickness large retracted tear  of the rotator cuff with associated findings consistent with chronic impingement and AC joint arthrosis.  Labral and biceps pathology is also likely present.  We had a long discussion regarding treatment options moving forward.  This includes continued conservative care, injections or arthroscopic surgery.  Nonoperative care may result in temporary or partial improvement in symptoms, but tear propagation is a risk.  This could result in progressive weakness, continued pain and a poor functional outcome.  The patient is most likely to have a predictable and satisfactory long-term outcome with definitive management including arthroscopic rotator cuff repair, subacromial decompression, distal clavicle resection and treatment of any other pathology seen at the labrum and biceps anchor.  Typical postoperative course including temporary immobilization and a formal rehabilitation program was outlined as well.  The patient expressed understanding and would like to proceed.  Risk, benefits and alternatives to surgery were discussed including but not limited to possible infection, bleeding, neurovascular injury as well as postop stiffness, continued pain and failed improvement following surgery.  Risks of anesthesia were also reviewed including but not limited to possible cardiac, pulmonary, or cerebrovascular complications, any of which could be life-threatening.  The patient expressed understanding and elects to proceed.  Pre-operative medical clearance for anesthesia will be required through the patient's primary care physician.  The planned operation is a right shoulder arthroscopy with rotator cuff repair, subacromial decompression, distal claviculectomy and debridement.  The patient was advised to contact our office for surgical scheduling as desired.  In the interim, the patient was advised to avoid any resistive use or overhead work with this upper extremity.  Gentle stretching exercises to prevent stiffness were  outlined and reviewed.  All questions were answered and the patient verbalized understanding and appreciation.    Pao Coombs MD, FAAOS  Orthopaedic Surgery   Sports Medicine/Knee and Shoulder  [facility]  t: [phone]  f: [phone]           The dictation was partially prepared using Dragon Medical voice recognition software.  Although every attempt is made to correct errors during dictation, discrepancies may still exist.             [1]   Past Medical History:   Allergic rhinitis    Sinus allergies for years    Back pain    Decorative tattoo    Diabetes (HCC)    Test requested by PCP    Essential hypertension    Hyperlipidemia    Over 15 years.    Sleep apnea    Type 1 diabetes mellitus (HCC)    Wears glasses   [2]   Past Surgical History:  Procedure Laterality Date    Colectomy      Colon surgery      Colonoscopy  Oct 2000         [3]   Current Outpatient Medications   Medication Sig Dispense Refill    metoprolol succinate ER 50 MG Oral Tablet 24 Hr Take 1 tablet (50 mg total) by mouth daily. 90 tablet 0    atorvastatin 20 MG Oral Tab Take 1 tablet (20 mg total) by mouth nightly. 90 tablet 1    metFORMIN  MG Oral Tablet 24 Hr Take 2 tablets (1,000 mg total) by mouth daily with breakfast. 180 tablet 1    Scopolamine 1.5mg TD patch 1mg/3days Place 1 patch onto the skin every third day. 10 patch 1    Multiple Vitamin (TAB-A-KALPANA) Oral Tab Take 1 tablet by mouth daily.      Meloxicam 7.5 MG Oral Tab Take 1 tablet (7.5 mg total) by mouth daily with food. (Patient not taking: Reported on 2025) 30 tablet 1    Olive Leaf Extract 500 MG Oral Cap Take 500 mg by mouth daily.   (Patient not taking: Reported on 2025)     [4]   Allergies  Allergen Reactions    Influenza Vaccine Live RASH     Rash and fatigue after first flu vaccine    Penicillins RASH and OTHER (SEE COMMENTS)     states she has never had it so \"doctor won't give it to me at an older age\"    [5]   Family History  Problem Relation Age of Onset    Diabetes Father     Hypertension Father     Heart Attack Father     Heart Disorder Father     Hypertension Brother     Heart Attack Brother     Stroke Brother     Diabetes Maternal Aunt

## 2025-05-22 NOTE — TELEPHONE ENCOUNTER
Spoke with Ese in regards to getting her scheduled for surgery. She states that at this time she would like to hold off on proceeding with surgery until after she see's her pcp. States that she is interested in proceeding with surgery some time in August and will be calling the office back to schedule when she is ready.

## 2025-06-11 ENCOUNTER — OFFICE VISIT (OUTPATIENT)
Dept: FAMILY MEDICINE CLINIC | Facility: CLINIC | Age: 70
End: 2025-06-11
Payer: MEDICARE

## 2025-06-11 VITALS
RESPIRATION RATE: 18 BRPM | BODY MASS INDEX: 22.33 KG/M2 | OXYGEN SATURATION: 96 % | DIASTOLIC BLOOD PRESSURE: 80 MMHG | HEART RATE: 66 BPM | WEIGHT: 130.81 LBS | SYSTOLIC BLOOD PRESSURE: 134 MMHG | TEMPERATURE: 98 F | HEIGHT: 64 IN

## 2025-06-11 DIAGNOSIS — Z00.00 ANNUAL PHYSICAL EXAM: Primary | ICD-10-CM

## 2025-06-11 DIAGNOSIS — S46.011S TRAUMATIC TEAR OF RIGHT ROTATOR CUFF, UNSPECIFIED TEAR EXTENT, SEQUELA: ICD-10-CM

## 2025-06-11 DIAGNOSIS — R73.03 PREDIABETES: ICD-10-CM

## 2025-06-11 DIAGNOSIS — H61.23 BILATERAL IMPACTED CERUMEN: ICD-10-CM

## 2025-06-11 DIAGNOSIS — E78.2 MIXED HYPERLIPIDEMIA: ICD-10-CM

## 2025-06-11 DIAGNOSIS — I10 ESSENTIAL HYPERTENSION, BENIGN: ICD-10-CM

## 2025-06-11 PROCEDURE — G0439 PPPS, SUBSEQ VISIT: HCPCS | Performed by: FAMILY MEDICINE

## 2025-06-11 PROCEDURE — G2211 COMPLEX E/M VISIT ADD ON: HCPCS | Performed by: FAMILY MEDICINE

## 2025-06-11 PROCEDURE — 99214 OFFICE O/P EST MOD 30 MIN: CPT | Performed by: FAMILY MEDICINE

## 2025-06-11 PROCEDURE — 96160 PT-FOCUSED HLTH RISK ASSMT: CPT | Performed by: FAMILY MEDICINE

## 2025-06-11 NOTE — PROGRESS NOTES
REASON FOR VISIT:    Ese Rodriguez is a 69 year old female who presents for a MA Supervisit.       Ese is a 69 yo F here for annual physical. 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 ]     Last pap smear: aged out  Last mammogram: 12/2023, normal--> scheduled for 6/18/25  Last colonoscopy: 7/2021, rpt in 5 yrs  Last DEXA Scan: 1/2022 with osteopenia    She is actively managing her weight through exercise, primarily walking, and currently weighs 130 pounds. Despite eating more, she has noticed weight loss, which she attributes to her exercise routine. She drinks water and juice, avoids sodas, and uses zero sugar drinks.  History of Present Illness    Ortho f-u: she has experienced shoulder issues for approximately twenty years, with recent MRI findings indicating significant deterioration. She plans to schedule surgery after a family reunion in August. Due to the shoulder issue, she compensates by using her left arm more frequently, resulting in soreness and pain. She is currently taking pain medication for her shoulder, which provides a calming effect but does not alleviate the pain. She has about four pills left from her last prescription and does not take them regularly.      Ear problem: She experiences occasional ringing in her right ear, which she suspects is due to wax buildup. She has previously had her ears cleaned by a specialist and is considering another referral for ear cleaning.      Prediabetes f-u:  12/2024 A1c is 6.1. She is taking Metformin  mg daily now, tolerating well.  Has no polyuria and no polydipsia.      Previous HPI: Last A1c in 3/2023 was 6.2. She was started on Metformin 500 mg QAM. She has not been tolerating this medication well, due to stomach upset and diarrhea.  She admits to only taking this 1-2x per week, since she needs to be a near a bathroom after taking.  She has no polyuria and no polydipsia. She is due to recheck her labs today.     Previous  HPI: Last A1c in 4/2022 was 6.0. She feels well- has no polyuria and no polydipsia. She managed this with diet and staying active.     Previous HPI: A1c from 4/2022 is well-controlled at 6.0.  She has been managing this with diet and lifestyle changes.  She has no polyuria and no polydipsia symptoms. She feels well.      Previous HPI from 6/2022; 4/2022 A1c is 6.0.  She has lost some weight due to stress in her life.  She is working a on healthy diet.  She has no sx of polyuria and no polydipsia.      Previous HPI from 4/2022: Most recent A1c in 4/2022 is 6.0.  She has been working on a healthy diet.  She has no polyuria and no polydipsia.      Previous HPI from 3/2022: She as noted on 2018 labs to have an A1c of 5.8.  She is overdue to recheck. She has no polyuria and no polydipsia. She is not on any medication for this.      HTN f-u: She is taking Metoprolol without any issues.  Has been off of Amlodipine for \"awhile.\" BP is normal today- she has no CP, SOB, HA, palpitations, and no leg swelling.     Previous HPI from 4/2022: BP is stable today.  She is currently taking Metoprolol and Amlodipine as prescribed. Tolerating well. She has no HA, no CP, no palpitations, no leg swelling.     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 She went to the ED and BP was in the 200s. She was released from the ED at the time since BP came down on its own. It was not until a few years later she was started on BP lowering medication. She is currently on Metoprolol and tolerating this well.  She has no HA, no change in vision, no CP, palpitations, no leg swelling. She tries to avoid salt in her diet, but loves to eat. 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 She states she had a TIA about 10 yrs ago- had blurry vision while  driving, lasted about 5 minutes then resolved. After workup and eval, she was told it was likely a TIA.      HL: She is currently taking Atorvastatin 10 mg, tolerating well. She has no muscle aches or pains     Patient Care Team: Patient Care Team:  Lila Ladd MD as PCP - General (Family Medicine)  Jesus Amin PA (Physician Assistant)  Benedict Muller, PT as Physical Therapist  Yola Verde PT as Physical Therapist    Problem List[1]  Current Medications[2]        Latest Ref Rng & Units 12/11/2024     7:50 AM 10/16/2024     8:45 AM 4/3/2024     9:55 AM 11/15/2023     8:04 AM 3/31/2023     9:58 AM 4/7/2022     9:06 AM 3/22/2021     9:00 AM   Glucose and HbA1c   Glucose 65 - 99 mg/dL 93  95  92  104  98  98  85          Latest Ref Rng & Units 12/11/2024     7:50 AM 10/16/2024     8:45 AM 4/3/2024     9:55 AM 11/15/2023     8:04 AM 3/31/2023     9:58 AM 4/7/2022     9:06 AM 3/22/2021     9:00 AM   Cholesterol   Total Cholesterol <200 mg/dL 166  220  223  190  191  174  254    Triglycerides <150 mg/dL 65  91  93  70  62  85  90    HDL > OR = 50 mg/dL 71  80  78  72  72  64  77    LDL mg/dL (calc) 81  121  126  105  107  92  157          Latest Ref Rng & Units 12/11/2024     7:50 AM 10/16/2024     8:45 AM 4/3/2024     9:55 AM 11/15/2023     8:04 AM 3/31/2023     9:58 AM 4/7/2022     9:06 AM 3/22/2021     9:00 AM   BUN and Cr   BUN 7 - 25 mg/dL 15  17  19  16  11  11  15    Creatinine 0.50 - 1.05 mg/dL 0.92  0.89  0.99  0.94  0.91  0.86  0.98          Latest Ref Rng & Units 12/11/2024     7:50 AM 10/16/2024     8:45 AM 4/3/2024     9:55 AM 11/15/2023     8:04 AM 3/31/2023     9:58 AM 4/7/2022     9:06 AM 3/22/2021     9:00 AM   AST and ALT   AST (SGOT) 10 - 35 U/L 17  17  16  15  17  18  16    ALT (SGPT) 6 - 29 U/L 15  15  15  26  22  16  12          Latest Ref Rng & Units 4/7/2022     9:06 AM 6/18/2020     8:47 AM   TSH and Free T4   TSH 0.40 - 4.50 mIU/L 1.54  0.74         General Health      In the  past six months, have you lost more than 10 pounds without trying?: 2 - No  Has your appetite been poor?: No  Type of Diet: Balanced, Diabetic  How does the patient maintain a good energy level?: Daily Walks, Appropriate Exercise  How would you describe your daily physical activity?: Moderate  How would you describe your current health state?: Good  How do you maintain positive mental well-being?:  (Shinto, trips)  Have you had any immunizations at another office such as Influenza, Hepatitis B, Tetanus, or Pneumococcal?: No     Functional Ability     Bathing or Showering: Able without help  Toileting: Able without help  Dressing: Able without help  Eating: Able without help  Driving: Able without help  Preparing your meals: Able without help  Managing money/bills: Able without help  Taking medications as prescribed: Able without help  Are you able to afford your medications?: Yes  Hearing Problems?: No     Functional Status     Hearing Problems?: No  Vision Problems? : Yes  Difficulty walking?: No  Difficulty dressing or bathing?: No  Problems with daily activities? : No  Memory Problems?: No      Fall/Risk Assessment                 Depression Screening (PHQ-2/PHQ-9): Over the LAST 2 WEEKS            Advance Directives     Do you have a healthcare power of ?: No  Do you have a living will?: Yes     Cognitive Assessment     What day of the week is this?: Correct  What month is it?: Correct  What year is it?: Correct  Recall \"Ball\": Correct  Recall \"Flag\": Correct  Recall \"Tree\": Correct         PREVENTATIVE SERVICES   INDICATIONS AND SCHEDULE Internal Lab or Procedure   Diabetes Screening     HbgA1C   Annually HEMOGLOBIN A1c (% of total Hgb)   Date Value   12/11/2024 6.1 (H)       Fasting Blood Sugar (FSB)Annually GLUCOSE (mg/dL)   Date Value   12/11/2024 93      Cardiovascular Disease Screening    LDL Annually LDL-CHOLESTEROL (mg/dL (calc))   Date Value   12/11/2024 81       EKG - w/ Initial Preventative  Physical Exam only, or if medically necessary N/a   Colorectal Cancer Screening     Colonoscopy Screen every 10 years Health Maintenance   Topic Date Due    Colorectal Cancer Screening  07/23/2026       Flex Sigmoidoscopy Screen every 5 years No results found for this or any previous visit.    Fecal Occult Blood Annually No results found for: \"FOB\", \"OCCULTSTOOL\"   Glaucoma Screening     Ophthalmology Visit Annually N/a   Immunizations     Zoster (Not covered by Medicare Part B) No orders found for this or any previous visit.     SPECIFIC DISEASE MONITORING Internal Lab or Procedure   No disease specific diagnoses       ALLERGIES:   Allergies[3]  MEDICAL INFORMATION:   Past Medical History[4]   Past Surgical History[5]   Family History[6]  Immunization History      Immunization History  Administered            Date(s) Administered    Covid-19 Vaccine Pfizer 30 mcg/0.3 ml                          08/23/2021 09/13/2021      TDAP                  12/10/2009  06/26/2020      Tb Intradermal Test   08/11/2010  08/24/2011  10/18/2013      Zoster Vaccine Live (Zostavax)                          06/17/2016        SOCIAL HISTORY:   Short Social Hx on File[7]     REVIEW OF SYSTEMS:   GENERAL: feels well otherwise  SKIN: denies any unusual skin lesions  EYES: denies blurred vision or double vision  HEENT: denies nasal congestion, sinus pain or ST  LUNGS: denies shortness of breath with exertion  CARDIOVASCULAR: denies chest pain on exertion  GI: denies abdominal pain, denies heartburn  : denies dysuria, vaginal discharge or itching  MUSCULOSKELETAL: denies back pain  NEURO: denies headaches  PSYCHE: denies depression or anxiety  HEMATOLOGIC: denies hx of anemia  ENDOCRINE: denies thyroid history  ALL/ASTHMA: denies hx of allergy or asthma    EXAM:   /80 (BP Location: Left arm, Patient Position: Sitting, Cuff Size: adult)   Pulse 66   Temp 97.5 °F (36.4 °C) (Temporal)   Resp 18   Ht 5' 4\" (1.626 m)   Wt 130 lb 12.8  oz (59.3 kg)   LMP  (LMP Unknown)   SpO2 96%   BMI 22.45 kg/m²    >   BP Readings from Last 3 Encounters:   06/11/25 134/80   01/08/25 138/64   07/10/24 138/82     GENERAL: well developed, well nourished, in no apparent distress   SKIN: no rashes, no suspicious lesions  HEENT: atraumatic, normocephalic, ears and throat are clear                Hearing Assessed via: Questionnaire  EYES: PERRLA, EOMI, conjunctiva are clear    NECK: supple, no adenopathy, no bruits  CHEST: no chest tenderness  BREAST:deferred   LUNGS: clear to auscultation  CARDIO: RRR without murmur  GI: good BS's, no masses, HSM or tenderness  : deferred  RECTAL: deferred  MUSCULOSKELETAL: back is not tender, FROM of the back  EXTREMITIES: no cyanosis, clubbing or edema  NEURO: Oriented times three, cranial nerves are intact, motor and sensory are grossly intact      ASSESSMENT AND OTHER RELEVANT CHRONIC CONDITIONS:   Ese Rodriguez is a 69 year old female who presents for a Medicare Assessment.     PLAN SUMMARY:   Diagnoses and all orders for this visit:    Annual physical exam  Routine labs ordered today, await results. Counseled pt on healthy lifestyle changes. Vaccines today: vaccines today . Contraception: postmenopausal .     Last pap smear: aged out  Last mammogram: 12/2023, normal--> scheduled for 6/18/25  Last colonoscopy: 7/2021, rpt in 5 yrs  Last DEXA Scan: 1/2022 with osteopenia    Essential hypertension, benign  - BP stable today  - Lytes and Cr are normal on 12/2024 labs, due to recheck today  - cont Metoprolol  - d/w pt a healthy, low-sodium diet, regular exercise, and wt loss  - RTC in 6 months for f-u     Mixed hyperlipidemia  - 12/2024 lipids normal, due to recheck in 10/2024  - cont Atorvastatin 10 mg daily  - d/w pt a healthy, low-fat/low-cholesterol diet, regular exercise, and wt loss    Prediabetes  - 12/2024 A1c is 6.1 is taking Metformin  mg daily  - she is amenable to increase to 1000 mg daily,   - f-u in 6  months    Traumatic tear of right rotator cuff, unspecified tear extent, sequela  - previous work injury in 2015, completed PT, but no surgery as she was recommended  - cont Meloxicam  - referral to Ortho, will defer imaging to Ortho    Bilateral impacted cerumen  - pt requests referral to ENT    -     ENT Referral - In Network      The patient and provider have a longitudinal relationship to address/treat the serious or   complex condition(s) as stated in this encounter.     The patient indicates understanding of these issues and agrees to the plan.  The patient is asked to return in 6 months for medication check, office visit, and reassessment  Diet counseling perfomed  Exercise counseling perfomed    SUGGESTED VACCINATIONS - Influenza, Pneumococcal, Zoster, Tetanus   Influenza: No recommendations at this time  Pneumonia: No recommendations at this time              [1]   Patient Active Problem List  Diagnosis    Sciatica of left side    Lumbar radiculopathy    Age-related nuclear cataract of both eyes    Carpal tunnel syndrome of left wrist    Essential hypertension, benign    Family history of early CAD    Herpes genitalis    Parotid mass    Prediabetes    Mixed hyperlipidemia    TIA (transient ischemic attack)    Special screening for malignant neoplasms, colon    Benign neoplasm of transverse colon    Osteopenia of neck of femur   [2]   Current Outpatient Medications   Medication Sig Dispense Refill    metoprolol succinate ER 50 MG Oral Tablet 24 Hr Take 1 tablet (50 mg total) by mouth daily. 90 tablet 0    atorvastatin 20 MG Oral Tab Take 1 tablet (20 mg total) by mouth nightly. 90 tablet 1    metFORMIN  MG Oral Tablet 24 Hr Take 2 tablets (1,000 mg total) by mouth daily with breakfast. 180 tablet 1    Scopolamine 1.5mg TD patch 1mg/3days Place 1 patch onto the skin every third day. 10 patch 1    Multiple Vitamin (TAB-A-KALPANA) Oral Tab Take 1 tablet by mouth daily.      Meloxicam 7.5 MG Oral Tab Take 1  tablet (7.5 mg total) by mouth daily with food. (Patient not taking: Reported on 2025) 30 tablet 1    Olive Leaf Extract 500 MG Oral Cap Take 500 mg by mouth daily.   (Patient not taking: Reported on 2025)     [3]   Allergies  Allergen Reactions    Influenza Vaccine Live RASH     Rash and fatigue after first flu vaccine    Penicillins RASH and OTHER (SEE COMMENTS)     states she has never had it so \"doctor won't give it to me at an older age\"   [4]   Past Medical History:   Allergic rhinitis    Sinus allergies for years    Back pain    Decorative tattoo    Diabetes (HCC)    Test requested by PCP    Essential hypertension    Hyperlipidemia    Over 15 years.    Sleep apnea    Type 1 diabetes mellitus (HCC)    Wears glasses   [5]   Past Surgical History:  Procedure Laterality Date    Colectomy      Colon surgery      Colonoscopy  Oct 2000         [6]   Family History  Problem Relation Age of Onset    Diabetes Father     Hypertension Father     Heart Attack Father     Heart Disorder Father     Hypertension Brother     Heart Attack Brother     Stroke Brother     Diabetes Maternal Aunt    [7]   Social History  Socioeconomic History    Marital status:    Tobacco Use    Smoking status: Never    Smokeless tobacco: Never   Vaping Use    Vaping status: Never Used   Substance and Sexual Activity    Alcohol use: Never    Drug use: Never   Other Topics Concern    Caffeine Concern No    Exercise Yes     Comment: Need to do more.  Not regular    Seat Belt Yes    Special Diet No    Stress Concern No    Weight Concern Yes

## 2025-06-11 NOTE — PROGRESS NOTES
The following individual(s) verbally consented to be recorded using ambient AI listening technology and understand that they can each withdraw their consent to this listening technology at any point by asking the clinician to turn off or pause the recording:    Patient name: Ese Michael  Additional names:  julián

## 2025-06-12 ENCOUNTER — TELEPHONE (OUTPATIENT)
Dept: ORTHOPEDICS CLINIC | Facility: CLINIC | Age: 70
End: 2025-06-12

## 2025-06-12 DIAGNOSIS — M75.21 BICEPS TENDINITIS OF RIGHT SHOULDER: ICD-10-CM

## 2025-06-12 DIAGNOSIS — M75.121 NONTRAUMATIC COMPLETE TEAR OF RIGHT ROTATOR CUFF: Primary | ICD-10-CM

## 2025-06-12 DIAGNOSIS — S43.439A DEGENERATIVE SUPERIOR LABRAL ANTERIOR-TO-POSTERIOR (SLAP) TEAR OF SHOULDER: ICD-10-CM

## 2025-06-12 DIAGNOSIS — M19.011 ARTHRITIS OF RIGHT ACROMIOCLAVICULAR JOINT: ICD-10-CM

## 2025-06-12 DIAGNOSIS — M75.41 IMPINGEMENT SYNDROME OF RIGHT SHOULDER: ICD-10-CM

## 2025-06-13 NOTE — TELEPHONE ENCOUNTER
SURGERY SCHEDULING SHEET    Ese Rodriguez  10/22/1955  [id number]    Procedure: Right Shoulder Arthroscopy, Rotator Cuff Repair (94372), Subacromial Decompression (28474), and Distal Clavicle Excision (79667) and debridement    Diagnosis:  Nontraumatic complete tear of right rotator cuff M75.121    Arthritis of right acromioclavicular joint M19.011    Impingement syndrome of right shoulder M75.41    Degenerative superior labral anterior-to-posterior (SLAP) tear of shoulder S43.439A    Biceps tendinitis of right shoulder M75.21    Anesthesia: General    Length of Surgery: 2.5 hrs    Disposition: Outpatient    Special Equipment: Arthrex    Positioning:    Assist: Yes SK PA-C    Pre-op Testing: PER ANESTHESIA GUIDELINES    Clearance: HISTORY AND PHYSICAL     Post op: 7-10 days post op    Poa Coombs MD  [facility] Orthopedic Surgery  Phone: [phone]  Fax: [phone]

## 2025-06-16 ENCOUNTER — TELEPHONE (OUTPATIENT)
Dept: FAMILY MEDICINE CLINIC | Facility: CLINIC | Age: 70
End: 2025-06-16

## 2025-06-16 DIAGNOSIS — Z01.818 PREOP EXAMINATION: Primary | ICD-10-CM

## 2025-06-16 DIAGNOSIS — E78.2 MIXED HYPERLIPIDEMIA: ICD-10-CM

## 2025-06-16 DIAGNOSIS — R73.03 PREDIABETES: ICD-10-CM

## 2025-06-16 DIAGNOSIS — I10 ESSENTIAL HYPERTENSION, BENIGN: ICD-10-CM

## 2025-06-16 NOTE — TELEPHONE ENCOUNTER
Reviewed with Pt to get labs done between 7/13/25-7/15/25. EKG will be done at pre op appt 7/16/25

## 2025-06-16 NOTE — TELEPHONE ENCOUNTER
Patient has an appointment on 7/16/25 for pre op and surgery is on 8/12/25 shannan Coombs  putting orders in nurses bin

## 2025-06-16 NOTE — TELEPHONE ENCOUNTER
Since labs need to be within 30 days of surgery and she is >49 yo and treated for prediabetes and HTN, please see lab orders for:  CBC  CMP  And   EKG    Thanks.

## 2025-06-16 NOTE — TELEPHONE ENCOUNTER
Pt having surgery 8/12/25- R shoulder with Dr. Coombs  Pre op appt 7/16/25  No labs/test required per pre op paperwork.  Did you want to order anything?

## 2025-07-01 ENCOUNTER — PATIENT MESSAGE (OUTPATIENT)
Dept: FAMILY MEDICINE CLINIC | Facility: CLINIC | Age: 70
End: 2025-07-01

## 2025-07-01 ENCOUNTER — MED REC SCAN ONLY (OUTPATIENT)
Dept: ORTHOPEDICS CLINIC | Facility: CLINIC | Age: 70
End: 2025-07-01

## 2025-07-01 DIAGNOSIS — I10 ESSENTIAL HYPERTENSION, BENIGN: ICD-10-CM

## 2025-07-04 RX ORDER — METOPROLOL SUCCINATE 50 MG/1
50 TABLET, EXTENDED RELEASE ORAL DAILY
Qty: 90 TABLET | Refills: 3 | Status: SHIPPED | OUTPATIENT
Start: 2025-07-04

## 2025-07-09 ENCOUNTER — RESULTS FOLLOW-UP (OUTPATIENT)
Dept: FAMILY MEDICINE CLINIC | Facility: CLINIC | Age: 70
End: 2025-07-09

## 2025-07-09 ENCOUNTER — HOSPITAL ENCOUNTER (OUTPATIENT)
Dept: MAMMOGRAPHY | Age: 70
Discharge: HOME | End: 2025-07-09
Attending: FAMILY MEDICINE
Payer: MEDICARE

## 2025-07-09 DIAGNOSIS — Z12.31 ENCOUNTER FOR SCREENING MAMMOGRAM FOR MALIGNANT NEOPLASM OF BREAST: ICD-10-CM

## 2025-07-09 PROCEDURE — 77067 SCR MAMMO BI INCL CAD: CPT | Performed by: FAMILY MEDICINE

## 2025-07-09 PROCEDURE — 77063 BREAST TOMOSYNTHESIS BI: CPT | Performed by: FAMILY MEDICINE

## 2025-07-14 ENCOUNTER — OFFICE VISIT (OUTPATIENT)
Facility: LOCATION | Age: 70
End: 2025-07-14
Payer: MEDICARE

## 2025-07-14 ENCOUNTER — LAB ENCOUNTER (OUTPATIENT)
Dept: LAB | Age: 70
End: 2025-07-14
Attending: FAMILY MEDICINE
Payer: MEDICARE

## 2025-07-14 DIAGNOSIS — H93.13 TINNITUS OF BOTH EARS: Primary | ICD-10-CM

## 2025-07-14 DIAGNOSIS — H61.23 BILATERAL IMPACTED CERUMEN: ICD-10-CM

## 2025-07-14 LAB
ALBUMIN SERPL-MCNC: 4.3 G/DL (ref 3.2–4.8)
ALBUMIN/GLOB SERPL: 1.5 {RATIO} (ref 1–2)
ALP LIVER SERPL-CCNC: 76 U/L (ref 55–142)
ALT SERPL-CCNC: 14 U/L (ref 10–49)
ANION GAP SERPL CALC-SCNC: 11 MMOL/L (ref 0–18)
AST SERPL-CCNC: 19 U/L (ref ?–34)
BASOPHILS # BLD AUTO: 0.06 X10(3) UL (ref 0–0.2)
BASOPHILS NFR BLD AUTO: 1.1 %
BILIRUB SERPL-MCNC: 0.5 MG/DL (ref 0.2–1.1)
BUN BLD-MCNC: 17 MG/DL (ref 9–23)
CALCIUM BLD-MCNC: 9.7 MG/DL (ref 8.7–10.6)
CHLORIDE SERPL-SCNC: 107 MMOL/L (ref 98–112)
CHOLEST SERPL-MCNC: 167 MG/DL (ref ?–200)
CO2 SERPL-SCNC: 27 MMOL/L (ref 21–32)
CREAT BLD-MCNC: 1.07 MG/DL (ref 0.55–1.02)
EGFRCR SERPLBLD CKD-EPI 2021: 56 ML/MIN/1.73M2 (ref 60–?)
EOSINOPHIL # BLD AUTO: 0.49 X10(3) UL (ref 0–0.7)
EOSINOPHIL NFR BLD AUTO: 8.6 %
ERYTHROCYTE [DISTWIDTH] IN BLOOD BY AUTOMATED COUNT: 14.5 %
EST. AVERAGE GLUCOSE BLD GHB EST-MCNC: 128 MG/DL (ref 68–126)
FASTING PATIENT LIPID ANSWER: YES
FASTING STATUS PATIENT QL REPORTED: YES
GLOBULIN PLAS-MCNC: 2.8 G/DL (ref 2–3.5)
GLUCOSE BLD-MCNC: 94 MG/DL (ref 70–99)
HBA1C MFR BLD: 6.1 % (ref ?–5.7)
HCT VFR BLD AUTO: 40.9 % (ref 35–48)
HDLC SERPL-MCNC: 78 MG/DL (ref 40–59)
HGB BLD-MCNC: 13.2 G/DL (ref 12–16)
IMM GRANULOCYTES # BLD AUTO: 0.01 X10(3) UL (ref 0–1)
IMM GRANULOCYTES NFR BLD: 0.2 %
LDLC SERPL CALC-MCNC: 80 MG/DL (ref ?–100)
LYMPHOCYTES # BLD AUTO: 2.45 X10(3) UL (ref 1–4)
LYMPHOCYTES NFR BLD AUTO: 43.1 %
MCH RBC QN AUTO: 28.6 PG (ref 26–34)
MCHC RBC AUTO-ENTMCNC: 32.3 G/DL (ref 31–37)
MCV RBC AUTO: 88.5 FL (ref 80–100)
MONOCYTES # BLD AUTO: 0.51 X10(3) UL (ref 0.1–1)
MONOCYTES NFR BLD AUTO: 9 %
NEUTROPHILS # BLD AUTO: 2.16 X10 (3) UL (ref 1.5–7.7)
NEUTROPHILS # BLD AUTO: 2.16 X10(3) UL (ref 1.5–7.7)
NEUTROPHILS NFR BLD AUTO: 38 %
NONHDLC SERPL-MCNC: 89 MG/DL (ref ?–130)
OSMOLALITY SERPL CALC.SUM OF ELEC: 301 MOSM/KG (ref 275–295)
PLATELET # BLD AUTO: 240 10(3)UL (ref 150–450)
POTASSIUM SERPL-SCNC: 4.1 MMOL/L (ref 3.5–5.1)
PROT SERPL-MCNC: 7.1 G/DL (ref 5.7–8.2)
RBC # BLD AUTO: 4.62 X10(6)UL (ref 3.8–5.3)
SODIUM SERPL-SCNC: 145 MMOL/L (ref 136–145)
TRIGL SERPL-MCNC: 40 MG/DL (ref 30–149)
VLDLC SERPL CALC-MCNC: 6 MG/DL (ref 0–30)
WBC # BLD AUTO: 5.7 X10(3) UL (ref 4–11)

## 2025-07-14 PROCEDURE — 80061 LIPID PANEL: CPT | Performed by: FAMILY MEDICINE

## 2025-07-14 PROCEDURE — 83036 HEMOGLOBIN GLYCOSYLATED A1C: CPT | Performed by: FAMILY MEDICINE

## 2025-07-14 PROCEDURE — 85025 COMPLETE CBC W/AUTO DIFF WBC: CPT | Performed by: FAMILY MEDICINE

## 2025-07-14 PROCEDURE — 80053 COMPREHEN METABOLIC PANEL: CPT | Performed by: FAMILY MEDICINE

## 2025-07-14 PROCEDURE — 36415 COLL VENOUS BLD VENIPUNCTURE: CPT | Performed by: FAMILY MEDICINE

## 2025-07-14 NOTE — PROGRESS NOTES
Otolaryngology Consultation Note     Reason for consultation: cerumen  Consulting physician and service: Lila Ladd MD      HPI: 70 y/o F presents with cerumen impaction. Has undergone cleaning in the past. No noticeable hearing loss. Positive bilateral tinnitus, intermittent, R>L, non-pulsatile. No otalgia, otorrhea or vertigo. No recent trauma or exposure to loud noise. No recent ear infection. No fever/chills. No other complaints.      Past Medical History: Past Medical History[1]     Past Surgical History: Past Surgical History[2]     Medication: Scheduled Meds:Scheduled Medications[3]  Continuous Infusions:Medication Infusions[4]  PRN Meds:.PRN Medications[5]     Allergies:  Allergies[6]  Pertinent Family History: Family History[7]     Pertinent Social History:   Social History     Socioeconomic History    Marital status:      Spouse name: Not on file    Number of children: Not on file    Years of education: Not on file    Highest education level: Not on file   Occupational History    Not on file   Tobacco Use    Smoking status: Never    Smokeless tobacco: Never   Vaping Use    Vaping status: Never Used   Substance and Sexual Activity    Alcohol use: Never    Drug use: Never    Sexual activity: Not on file   Other Topics Concern    Caffeine Concern No    Exercise Yes     Comment: Need to do more.  Not regular    Seat Belt Yes    Special Diet No    Stress Concern No    Weight Concern Yes   Social History Narrative    Not on file     Social Drivers of Health     Food Insecurity: Not on file   Transportation Needs: Not on file   Stress: Not on file   Housing Stability: Not on file        Review of Systems:  Constitutional: Negative.  HENT: see above  Eyes: Negative.  Respiratory: Negative.  Cardiovascular: Negative.  Gastrointestinal: Negative.  Musculoskeletal: Negative.  Skin: Negative.  Renal: Negative  Endocrine: Negative  Psychiatric/Behavioral: Negative.     Physical  Examination:  Vitals: not currently breastfeeding.     General: Breathing comfortably on room air while sitting up. Able to communicate verbally. Voice normal. Normal appearing body habitus.     Musculoskeletal: Head: Atraumatic and normocephalic.     Neck: Full ROM and able to extend without issues     Ears: External auditory canals with cerumen, unable to see tympanic membranes     Nose: No sinus tenderness bilaterally upon palpation. No obvious nasal deformity. No masses, rhinorrhea, epistaxis.      Mouth/Throat: Salivary glands appear normal with no evidence of pain or mass. No masses or lesions noted within the oral mucosa, hard and soft palates, tongue, tonsils and posterior pharynx. Able to tolerate secretions. Oral cavity and oropharynx widely patent. Tonsils 1-2 plus and symmetric. Posterior pharyngeal walls appear normal. Thyroid non tender to palpation without evidence of mass or nodules.     Eyes: Extraocular movements intact and pupils equally reactive to light stimulus. No spontaneous or gaze-evoked nystagmus. No proptosis or ecchymosis. VFI.     Lymphatic: No significant cervical lymphadenopathy noted.     Neuro: CN 7 intact with symmetric mobility and strength. No loss of facial sensation.     Skin: Dry, normal turgor, normal color.     Psych: Alert and oriented to person/place/time. Normal affect, amiable     Significant laboratory values: n/a     Imaging: n/a     Procedures:   Patient name: Ese Rodriguez     YOB: 1971     Procedure: Bilateral cerumen cleaning under direct exam     Indication: Cerumen impaction impairing exam of clinically significant portions of the external auditory canal, tympanic membrane, and middle ear space.     Surgeon: Nick Layton MD     Anesthesia: None     Detail: Verbal consent obtained by patient. Patient seated upright in the examination chair. Right ear brought into focus using a nasal speculum. Cerumen was visualized in the external  auditory canal and cleaned using cerumen curettes and a speculum. Tympanic membrane was visualized and was noted to be intact with no evidence of perforation, retraction, or middle ear effusion. Contralateral ear was addressed in a similar manner with similar findings. Patient tolerated the procedure well without complication.     Disposition: Patient instructed to follow up as described in assessment and plan portion of this notation.       Assessment/Plan:      Bilateral tinnitus: recommend masking techniques. Will order audiogram, pt deferred testing today due to work obligation.  Bilateral cerumen impaction: removed today. Recommend Debrox gtt prn.      Dr. Lila Ladd MD, thank you for involving me in this patient's care. Please contact me with further questions or concerns.    Nick Layton MD         [1]   Past Medical History:   Allergic rhinitis    Sinus allergies for years    Back pain    Decorative tattoo    Diabetes (HCC)    Test requested by PCP    Essential hypertension    Hyperlipidemia    Over 15 years.    Sleep apnea    Type 1 diabetes mellitus (HCC)    Wears glasses   [2]   Past Surgical History:  Procedure Laterality Date    Colectomy      Colon surgery      Colonoscopy  Oct 2000         [3] [4] [5] [6]   Allergies  Allergen Reactions    Influenza Vaccine Live RASH     Rash and fatigue after first flu vaccine    Penicillins RASH and OTHER (SEE COMMENTS)     states she has never had it so \"doctor won't give it to me at an older age\"   [7]   Family History  Problem Relation Age of Onset    Diabetes Father     Hypertension Father     Heart Attack Father     Heart Disorder Father     Hypertension Brother     Heart Attack Brother     Stroke Brother     Diabetes Maternal Aunt

## 2025-07-16 ENCOUNTER — OFFICE VISIT (OUTPATIENT)
Dept: FAMILY MEDICINE CLINIC | Facility: CLINIC | Age: 70
End: 2025-07-16
Payer: MEDICARE

## 2025-07-16 ENCOUNTER — TELEPHONE (OUTPATIENT)
Dept: FAMILY MEDICINE CLINIC | Facility: CLINIC | Age: 70
End: 2025-07-16

## 2025-07-16 VITALS
OXYGEN SATURATION: 97 % | HEART RATE: 65 BPM | DIASTOLIC BLOOD PRESSURE: 80 MMHG | SYSTOLIC BLOOD PRESSURE: 140 MMHG | WEIGHT: 128.81 LBS | TEMPERATURE: 98 F | BODY MASS INDEX: 21.99 KG/M2 | HEIGHT: 64 IN | RESPIRATION RATE: 18 BRPM

## 2025-07-16 DIAGNOSIS — I10 ESSENTIAL HYPERTENSION, BENIGN: ICD-10-CM

## 2025-07-16 DIAGNOSIS — R63.4 UNINTENTIONAL WEIGHT LOSS: ICD-10-CM

## 2025-07-16 DIAGNOSIS — R94.31 ABNORMAL EKG: ICD-10-CM

## 2025-07-16 DIAGNOSIS — E78.2 MIXED HYPERLIPIDEMIA: ICD-10-CM

## 2025-07-16 DIAGNOSIS — M75.121 NONTRAUMATIC COMPLETE TEAR OF RIGHT ROTATOR CUFF: ICD-10-CM

## 2025-07-16 DIAGNOSIS — Z01.818 PREOP EXAMINATION: Primary | ICD-10-CM

## 2025-07-16 DIAGNOSIS — R73.03 PREDIABETES: ICD-10-CM

## 2025-07-16 PROCEDURE — 99214 OFFICE O/P EST MOD 30 MIN: CPT | Performed by: FAMILY MEDICINE

## 2025-07-16 PROCEDURE — G2211 COMPLEX E/M VISIT ADD ON: HCPCS | Performed by: FAMILY MEDICINE

## 2025-07-16 PROCEDURE — 93000 ELECTROCARDIOGRAM COMPLETE: CPT | Performed by: FAMILY MEDICINE

## 2025-07-16 NOTE — PROGRESS NOTES
CHIEF COMPLAINT:   Chief Complaint   Patient presents with    Pre-Op Exam     Procedure :RIGHT SHOULDER ARTHROSCOPY ROTATOR CUFF REPAIR, SUBACROMIAL DECOMPRESSION, AND DISTAL CLAVICLE EXCISION AND DEBRIDEMENT Date: 8/12/2025 Surgeon : Pao Coombs MD               HPI:     Ese Rodriguez is a 69 year old female presents for pre-op physical.    Pt is here for a pre-op physical.     Procedure: RIGHT SHOULDER ARTHROSCOPY ROTATOR CUFF REPAIR, SUBACROMIAL DECOMPRESSION, AND DISTAL CLAVICLE EXCISION AND DEBRIDEMENT    Surgery Date: 8/12/25    Location: [facility]    Surgeon: Dr. BURAK Coombs    Pt has a h/o previous surgeries with anesthesia without any complications. Pt does not smoke. Pt has no CP, SOB, palpitations, or dizziness at rest or with activity.        Prediabetes f-u:  7/2025 A1c is 6.3. She is taking Metformin  mg daily now, tolerating well.  Has no polyuria and no polydipsia.      Previous HPI: Last A1c in 3/2023 was 6.2. She was started on Metformin 500 mg QAM. She has not been tolerating this medication well, due to stomach upset and diarrhea.  She admits to only taking this 1-2x per week, since she needs to be a near a bathroom after taking.  She has no polyuria and no polydipsia. She is due to recheck her labs today.     Previous HPI: Last A1c in 4/2022 was 6.0. She feels well- has no polyuria and no polydipsia. She managed this with diet and staying active.     Previous HPI: A1c from 4/2022 is well-controlled at 6.0.  She has been managing this with diet and lifestyle changes.  She has no polyuria and no polydipsia symptoms. She feels well.      Previous HPI from 6/2022; 4/2022 A1c is 6.0.  She has lost some weight due to stress in her life.  She is working a on healthy diet.  She has no sx of polyuria and no polydipsia.      Previous HPI from 4/2022: Most recent A1c in 4/2022 is 6.0.  She has been working on a healthy diet.  She has no polyuria and no polydipsia.      Previous HPI from  3/2022: She as noted on 2018 labs to have an A1c of 5.8.  She is overdue to recheck. She has no polyuria and no polydipsia. She is not on any medication for this.      HTN f-u: She is taking Metoprolol without any issues.  Has been off of Amlodipine for \"awhile.\" BP is normal today- she has no CP, SOB, HA, palpitations, and no leg swelling.     Previous HPI from 4/2022: BP is stable today.  She is currently taking Metoprolol and Amlodipine as prescribed. Tolerating well. She has no HA, no CP, no palpitations, no leg swelling.     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 She went to the ED and BP was in the 200s. She was released from the ED at the time since BP came down on its own. It was not until a few years later she was started on BP lowering medication. She is currently on Metoprolol and tolerating this well.  She has no HA, no change in vision, no CP, palpitations, no leg swelling. She tries to avoid salt in her diet, but loves to eat. 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 She states she had a TIA about 10 yrs ago- had blurry vision while driving, lasted about 5 minutes then resolved. After workup and eval, she was told it was likely a TIA.      HL: She is currently taking Atorvastatin 10 mg, tolerating well. She has no muscle aches or pains      Weight loss:pt reports has been losing weight unintentionally, 12 lbs from May 2025.  She has no fatigue, no abdominal pain or swelling.  She is UTD with her routine screenings: mammogram and colonoscopy. Aged otu for pap smear.                HISTORY:  Past Medical History[1]   Past Surgical History[2]   Family History[3]   Social History: Short Social Hx on File[4]     Medications (Active prior to today's visit):  Current  Medications[5]    Allergies:  Allergies[6]    PSFH elements reviewed from today and agreed except as otherwise stated in HPI.  ROS:     Review of Systems   Constitutional:  Positive for unexpected weight change. Negative for appetite change, fatigue and fever.   Respiratory:  Negative for cough, chest tightness and shortness of breath.    Cardiovascular:  Negative for chest pain, palpitations and leg swelling.   Gastrointestinal:  Negative for abdominal pain, constipation, diarrhea, nausea and vomiting.   Musculoskeletal:  Positive for arthralgias.   Neurological:  Negative for headaches.         Pertinent positives and negatives noted in the the HPI.    PHYSICAL EXAM:   /80 (BP Location: Left arm, Patient Position: Sitting, Cuff Size: adult)   Pulse 65   Temp 97.5 °F (36.4 °C) (Temporal)   Resp 18   Ht 5' 4\" (1.626 m)   Wt 128 lb 12.8 oz (58.4 kg)   LMP  (LMP Unknown)   SpO2 97%   BMI 22.11 kg/m²   Vital signs reviewed.Appears stated age, well groomed.  Physical Exam  Vitals reviewed.   Constitutional:       Appearance: Normal appearance.   HENT:      Head: Normocephalic.   Cardiovascular:      Rate and Rhythm: Normal rate and regular rhythm.      Pulses: Normal pulses.      Heart sounds: Normal heart sounds.   Pulmonary:      Effort: Pulmonary effort is normal.      Breath sounds: Normal breath sounds.   Abdominal:      General: Bowel sounds are normal. There is no distension.      Palpations: Abdomen is soft.      Tenderness: There is no abdominal tenderness. There is no guarding.   Musculoskeletal:      Cervical back: Normal range of motion and neck supple.      Right lower leg: No edema.      Left lower leg: No edema.   Lymphadenopathy:      Cervical: No cervical adenopathy.   Skin:     General: Skin is warm and dry.   Neurological:      Mental Status: She is alert and oriented to person, place, and time.   Psychiatric:         Behavior: Behavior normal.          LABS     Telephone on 06/16/2025    Component Date Value    WBC 07/14/2025 5.7     RBC 07/14/2025 4.62     HGB 07/14/2025 13.2     HCT 07/14/2025 40.9     PLT 07/14/2025 240.0     MCV 07/14/2025 88.5     MCH 07/14/2025 28.6     MCHC 07/14/2025 32.3     RDW 07/14/2025 14.5     Neutrophil Absolute Prel* 07/14/2025 2.16     Neutrophil Absolute 07/14/2025 2.16     Lymphocyte Absolute 07/14/2025 2.45     Monocyte Absolute 07/14/2025 0.51     Eosinophil Absolute 07/14/2025 0.49     Basophil Absolute 07/14/2025 0.06     Immature Granulocyte Abs* 07/14/2025 0.01     Neutrophil % 07/14/2025 38.0     Lymphocyte % 07/14/2025 43.1     Monocyte % 07/14/2025 9.0     Eosinophil % 07/14/2025 8.6     Basophil % 07/14/2025 1.1     Immature Granulocyte % 07/14/2025 0.2       REVIEWED THIS VISIT  ASSESSMENT/PLAN:   69 year old female with    1. Preop examination  - pt is moderate-risk for upcoming surgical procedure and is NOT medically cleared to proceed with surgeyr with  with Dr. Coombs as scheduled on 8/12/25  - EKG abnormal and compared to previous EKG from 4/2021, has new findings: today sinus marco, T wave abnormality and anterolateral ischemia; pt asymptomatic with VS stable  - labs ordered and reviewed with pt: CBC and CMP wnl  - referral to Cardiology for further eval of abnormal EKG; await recs before clearance given for surgery    - EKG with interpretation and Report -IN OFFICE [86203]  - Cardio Referral - Internal    2. Nontraumatic complete tear of right rotator cuff  See above.    3. Abnormal EKG  See above.    - Cardio Referral - Internal    4. Essential hypertension, benign  - BP stable today  - Lytes and Cr are normal on 12/2024 labs, due to recheck today  - cont Metoprolol  - d/w pt a healthy, low-sodium diet, regular exercise, and wt loss  - RTC in 6 months for f-u    - Cardio Referral - Internal  - COMP METABOLIC PANEL [44269] [Q]; Future  - LIPID PANEL [7600] [Q]; Future  - COMP METABOLIC PANEL [69757] [Q]  - LIPID PANEL [7600] [Q]    5. Mixed  hyperlipidemia  - 7/2025 lipids normal  - cont Atorvastatin 10 mg daily  - d/w pt a healthy, low-fat/low-cholesterol diet, regular exercise, and wt loss    - COMP METABOLIC PANEL [93184] [Q]; Future  - LIPID PANEL [7600] [Q]; Future  - COMP METABOLIC PANEL [65843] [Q]  - LIPID PANEL [7600] [Q]    6. Prediabetes  - 7/2025 A1c is 6.3 is still taking Metformin  mg daily  - INCREASE to 1000 mg daily,   - f-u in 6 months    - HGB A1C [496] [Q]; Future  - HGB A1C [496] [Q]    7. Unintentional weight loss  - check TSH    - TSH W REFLEX TO FREE T4 [97698][Q]; Future  - TSH W REFLEX TO FREE T4 [51762][Q]      Meds This Visit:  Requested Prescriptions      No prescriptions requested or ordered in this encounter       Health Maintenance:  Health Maintenance   Topic Date Due    Pneumococcal Vaccine: 50+ Years (1 of 1 - PCV) Never done    Zoster Vaccines (2 of 3) 08/12/2016    COVID-19 Vaccine (3 - 2024-25 season) 09/01/2024    Influenza Vaccine (1) 10/01/2025    Mammogram  07/09/2026    Colorectal Cancer Screening  07/23/2026    DEXA Scan  Completed    Annual Well Visit  Completed    Annual Depression Screening  Completed    Fall Risk Screening (Annual)  Completed    Meningococcal B Vaccine  Aged Out         Patient/Caregiver Education: There are no barriers to learning. Medical education done.   Outcome: Patient verbalizes understanding and agrees with plan. Advised to call or RTC if symptoms persist or worsen.    Problem List:   Problem List[7]    Imaging & Referrals:  ELECTROCARDIOGRAM, COMPLETE  CARDIO - INTERNAL     7/16/2025  Lila Ladd MD      Patient understands plan and follow-up.  Return in about 6 months (around 1/16/2026) for prediabetes, HTN, Cholesterol, medication follow-up.                [1]   Past Medical History:   Allergic rhinitis    Sinus allergies for years    Back pain    Decorative tattoo    Diabetes (HCC)    Test requested by PCP    Essential hypertension    Hyperlipidemia    Over 15  years.    Sleep apnea    Type 1 diabetes mellitus (HCC)    Wears glasses   [2]   Past Surgical History:  Procedure Laterality Date    Colectomy      Colon surgery      Colonoscopy  Oct 2000         [3]   Family History  Problem Relation Age of Onset    Diabetes Father     Hypertension Father     Heart Attack Father     Heart Disorder Father     Hypertension Brother     Heart Attack Brother     Stroke Brother     Diabetes Maternal Aunt    [4]   Social History  Socioeconomic History    Marital status:    Tobacco Use    Smoking status: Never    Smokeless tobacco: Never   Vaping Use    Vaping status: Never Used   Substance and Sexual Activity    Alcohol use: Never    Drug use: Never   Other Topics Concern    Caffeine Concern No    Exercise Yes     Comment: Need to do more.  Not regular    Seat Belt Yes    Special Diet No    Stress Concern No    Weight Concern Yes   [5]   Current Outpatient Medications   Medication Sig Dispense Refill    metoprolol succinate ER 50 MG Oral Tablet 24 Hr Take 1 tablet (50 mg total) by mouth daily. 90 tablet 3    atorvastatin 20 MG Oral Tab Take 1 tablet (20 mg total) by mouth nightly. 90 tablet 1    metFORMIN  MG Oral Tablet 24 Hr Take 2 tablets (1,000 mg total) by mouth daily with breakfast. 180 tablet 1    Meloxicam 7.5 MG Oral Tab Take 1 tablet (7.5 mg total) by mouth daily with food. (Patient not taking: Reported on 2025) 30 tablet 1    Scopolamine 1.5mg TD patch 1mg/3days Place 1 patch onto the skin every third day. 10 patch 1    Multiple Vitamin (TAB-A-KALPANA) Oral Tab Take 1 tablet by mouth daily.      Olive Leaf Extract 500 MG Oral Cap Take 500 mg by mouth daily.   (Patient not taking: Reported on 2025)     [6]   Allergies  Allergen Reactions    Influenza Vaccine Live RASH     Rash and fatigue after first flu vaccine    Penicillins RASH and OTHER (SEE COMMENTS)     states she has never had it so \"doctor won't give it to me at an older age\"   [7]   Patient  Active Problem List  Diagnosis    Sciatica of left side    Lumbar radiculopathy    Age-related nuclear cataract of both eyes    Carpal tunnel syndrome of left wrist    Essential hypertension, benign    Family history of early CAD    Herpes genitalis    Parotid mass    Prediabetes    Mixed hyperlipidemia    TIA (transient ischemic attack)    Special screening for malignant neoplasms, colon    Benign neoplasm of transverse colon    Osteopenia of neck of femur

## 2025-07-16 NOTE — TELEPHONE ENCOUNTER
Received signed medical records request/authorization form for [facility] to disclose patient health information to the Facility identified below:     Facility / Provider Name: [employer]    Facility Address: [facility] [address] [place] [address]    Facility Phone: [phone]    Facility Fax:    Specific PHI to be disclosed:    Medical records timeframe: See Request    Medical Records Request/Authorization form has been faxed to above Facility/Provider.   Received fax confirmation. MR Authorization form has also been sent to scanning.

## 2025-07-17 ENCOUNTER — PATIENT MESSAGE (OUTPATIENT)
Dept: FAMILY MEDICINE CLINIC | Facility: CLINIC | Age: 70
End: 2025-07-17

## 2025-07-18 LAB
ATRIAL RATE: 59 BPM
P AXIS: -13 DEGREES
P-R INTERVAL: 154 MS
Q-T INTERVAL: 420 MS
QRS DURATION: 72 MS
QTC CALCULATION (BEZET): 415 MS
R AXIS: 73 DEGREES
T AXIS: -27 DEGREES
VENTRICULAR RATE: 59 BPM

## 2025-08-05 ENCOUNTER — TELEPHONE (OUTPATIENT)
Dept: FAMILY MEDICINE CLINIC | Facility: CLINIC | Age: 70
End: 2025-08-05

## 2025-08-07 ENCOUNTER — ANESTHESIA EVENT (OUTPATIENT)
Dept: SURGERY | Facility: HOSPITAL | Age: 70
End: 2025-08-07

## 2025-08-12 ENCOUNTER — ANESTHESIA (OUTPATIENT)
Dept: SURGERY | Facility: HOSPITAL | Age: 70
End: 2025-08-12

## 2025-08-12 ENCOUNTER — HOSPITAL ENCOUNTER (OUTPATIENT)
Facility: HOSPITAL | Age: 70
Setting detail: HOSPITAL OUTPATIENT SURGERY
Discharge: HOME | End: 2025-08-12
Attending: ORTHOPAEDIC SURGERY | Admitting: ORTHOPAEDIC SURGERY

## 2025-08-12 VITALS
RESPIRATION RATE: 16 BRPM | HEART RATE: 76 BPM | HEIGHT: 64 IN | BODY MASS INDEX: 22.2 KG/M2 | WEIGHT: 130 LBS | DIASTOLIC BLOOD PRESSURE: 80 MMHG | OXYGEN SATURATION: 97 % | SYSTOLIC BLOOD PRESSURE: 162 MMHG | TEMPERATURE: 98 F

## 2025-08-12 DIAGNOSIS — Z98.890 STATUS POST ARTHROSCOPY OF RIGHT SHOULDER: Primary | ICD-10-CM

## 2025-08-12 LAB — GLUCOSE BLD-MCNC: 99 MG/DL (ref 70–99)

## 2025-08-12 PROCEDURE — 76942 ECHO GUIDE FOR BIOPSY: CPT | Performed by: ANESTHESIOLOGY

## 2025-08-12 PROCEDURE — 82962 GLUCOSE BLOOD TEST: CPT

## 2025-08-12 DEVICE — IMPLANTABLE DEVICE: Type: IMPLANTABLE DEVICE | Site: SHOULDER | Status: FUNCTIONAL

## 2025-08-12 RX ORDER — TRANEXAMIC ACID 10 MG/ML
INJECTION, SOLUTION INTRAVENOUS AS NEEDED
Status: DISCONTINUED | OUTPATIENT
Start: 2025-08-12 | End: 2025-08-12 | Stop reason: SURG

## 2025-08-12 RX ORDER — ONDANSETRON 2 MG/ML
4 INJECTION INTRAMUSCULAR; INTRAVENOUS EVERY 6 HOURS PRN
Status: DISCONTINUED | OUTPATIENT
Start: 2025-08-12 | End: 2025-08-12

## 2025-08-12 RX ORDER — HYDROCODONE BITARTRATE AND ACETAMINOPHEN 5; 325 MG/1; MG/1
2 TABLET ORAL ONCE AS NEEDED
Status: COMPLETED | OUTPATIENT
Start: 2025-08-12 | End: 2025-08-12

## 2025-08-12 RX ORDER — HYDROCODONE BITARTRATE AND ACETAMINOPHEN 5; 325 MG/1; MG/1
1 TABLET ORAL ONCE AS NEEDED
Status: COMPLETED | OUTPATIENT
Start: 2025-08-12 | End: 2025-08-12

## 2025-08-12 RX ORDER — ACETAMINOPHEN 500 MG
1000 TABLET ORAL ONCE
Status: DISCONTINUED | OUTPATIENT
Start: 2025-08-12 | End: 2025-08-12 | Stop reason: HOSPADM

## 2025-08-12 RX ORDER — MIDAZOLAM HYDROCHLORIDE 1 MG/ML
INJECTION INTRAMUSCULAR; INTRAVENOUS AS NEEDED
Status: DISCONTINUED | OUTPATIENT
Start: 2025-08-12 | End: 2025-08-12 | Stop reason: SURG

## 2025-08-12 RX ORDER — HYDRALAZINE HYDROCHLORIDE 20 MG/ML
INJECTION INTRAMUSCULAR; INTRAVENOUS
Status: COMPLETED
Start: 2025-08-12 | End: 2025-08-12

## 2025-08-12 RX ORDER — NICOTINE POLACRILEX 4 MG
30 LOZENGE BUCCAL
Status: DISCONTINUED | OUTPATIENT
Start: 2025-08-12 | End: 2025-08-12 | Stop reason: HOSPADM

## 2025-08-12 RX ORDER — HYDROMORPHONE HYDROCHLORIDE 1 MG/ML
0.6 INJECTION, SOLUTION INTRAMUSCULAR; INTRAVENOUS; SUBCUTANEOUS EVERY 5 MIN PRN
Status: DISCONTINUED | OUTPATIENT
Start: 2025-08-12 | End: 2025-08-12

## 2025-08-12 RX ORDER — ACETAMINOPHEN 500 MG
1000 TABLET ORAL ONCE AS NEEDED
Status: COMPLETED | OUTPATIENT
Start: 2025-08-12 | End: 2025-08-12

## 2025-08-12 RX ORDER — HYDROMORPHONE HYDROCHLORIDE 1 MG/ML
0.4 INJECTION, SOLUTION INTRAMUSCULAR; INTRAVENOUS; SUBCUTANEOUS EVERY 5 MIN PRN
Status: DISCONTINUED | OUTPATIENT
Start: 2025-08-12 | End: 2025-08-12

## 2025-08-12 RX ORDER — HYDRALAZINE HYDROCHLORIDE 20 MG/ML
10 INJECTION INTRAMUSCULAR; INTRAVENOUS ONCE
Status: COMPLETED | OUTPATIENT
Start: 2025-08-12 | End: 2025-08-12

## 2025-08-12 RX ORDER — METOCLOPRAMIDE HYDROCHLORIDE 5 MG/ML
10 INJECTION INTRAMUSCULAR; INTRAVENOUS EVERY 8 HOURS PRN
Status: DISCONTINUED | OUTPATIENT
Start: 2025-08-12 | End: 2025-08-12

## 2025-08-12 RX ORDER — NALOXONE HYDROCHLORIDE 0.4 MG/ML
0.08 INJECTION, SOLUTION INTRAMUSCULAR; INTRAVENOUS; SUBCUTANEOUS AS NEEDED
Status: DISCONTINUED | OUTPATIENT
Start: 2025-08-12 | End: 2025-08-12

## 2025-08-12 RX ORDER — SODIUM CHLORIDE, SODIUM LACTATE, POTASSIUM CHLORIDE, CALCIUM CHLORIDE 600; 310; 30; 20 MG/100ML; MG/100ML; MG/100ML; MG/100ML
INJECTION, SOLUTION INTRAVENOUS CONTINUOUS
Status: DISCONTINUED | OUTPATIENT
Start: 2025-08-12 | End: 2025-08-12

## 2025-08-12 RX ORDER — MEPERIDINE HYDROCHLORIDE 25 MG/ML
25 INJECTION INTRAMUSCULAR; INTRAVENOUS; SUBCUTANEOUS
Status: DISCONTINUED | OUTPATIENT
Start: 2025-08-12 | End: 2025-08-12

## 2025-08-12 RX ORDER — HYDROCODONE BITARTRATE AND ACETAMINOPHEN 5; 325 MG/1; MG/1
1 TABLET ORAL
Qty: 30 TABLET | Refills: 0 | Status: SHIPPED | OUTPATIENT
Start: 2025-08-12 | End: 2025-08-27

## 2025-08-12 RX ORDER — DEXTROSE MONOHYDRATE 25 G/50ML
50 INJECTION, SOLUTION INTRAVENOUS
Status: DISCONTINUED | OUTPATIENT
Start: 2025-08-12 | End: 2025-08-12 | Stop reason: HOSPADM

## 2025-08-12 RX ORDER — NICOTINE POLACRILEX 4 MG
15 LOZENGE BUCCAL
Status: DISCONTINUED | OUTPATIENT
Start: 2025-08-12 | End: 2025-08-12 | Stop reason: HOSPADM

## 2025-08-12 RX ORDER — HYDROMORPHONE HYDROCHLORIDE 1 MG/ML
0.2 INJECTION, SOLUTION INTRAMUSCULAR; INTRAVENOUS; SUBCUTANEOUS EVERY 5 MIN PRN
Status: DISCONTINUED | OUTPATIENT
Start: 2025-08-12 | End: 2025-08-12

## 2025-08-12 RX ORDER — MIDAZOLAM HYDROCHLORIDE 1 MG/ML
1 INJECTION INTRAMUSCULAR; INTRAVENOUS EVERY 5 MIN PRN
Status: DISCONTINUED | OUTPATIENT
Start: 2025-08-12 | End: 2025-08-12

## 2025-08-12 RX ORDER — METOCLOPRAMIDE HYDROCHLORIDE 5 MG/ML
INJECTION INTRAMUSCULAR; INTRAVENOUS AS NEEDED
Status: DISCONTINUED | OUTPATIENT
Start: 2025-08-12 | End: 2025-08-12 | Stop reason: SURG

## 2025-08-12 RX ADMIN — MIDAZOLAM HYDROCHLORIDE 2 MG: 1 INJECTION INTRAMUSCULAR; INTRAVENOUS at 08:55:00

## 2025-08-12 RX ADMIN — METOCLOPRAMIDE HYDROCHLORIDE 10 MG: 5 INJECTION INTRAMUSCULAR; INTRAVENOUS at 11:00:00

## 2025-08-12 RX ADMIN — TRANEXAMIC ACID 1000 MG: 10 INJECTION, SOLUTION INTRAVENOUS at 09:05:00

## 2025-08-13 ENCOUNTER — TELEPHONE (OUTPATIENT)
Dept: ORTHOPEDICS CLINIC | Facility: CLINIC | Age: 70
End: 2025-08-13

## 2025-08-13 DIAGNOSIS — Z98.890 STATUS POST ARTHROSCOPY OF RIGHT SHOULDER: Primary | ICD-10-CM

## 2025-08-13 RX ORDER — OXYCODONE HYDROCHLORIDE 5 MG/1
5 TABLET ORAL EVERY 6 HOURS PRN
Qty: 20 TABLET | Refills: 0 | Status: SHIPPED | OUTPATIENT
Start: 2025-08-13

## 2025-08-14 ENCOUNTER — PATIENT MESSAGE (OUTPATIENT)
Dept: ORTHOPEDICS CLINIC | Facility: CLINIC | Age: 70
End: 2025-08-14

## 2025-08-20 ENCOUNTER — OFFICE VISIT (OUTPATIENT)
Dept: ORTHOPEDICS CLINIC | Facility: CLINIC | Age: 70
End: 2025-08-20

## 2025-08-20 ENCOUNTER — TELEPHONE (OUTPATIENT)
Dept: ORTHOPEDICS CLINIC | Facility: CLINIC | Age: 70
End: 2025-08-20

## 2025-08-20 VITALS — WEIGHT: 130 LBS | BODY MASS INDEX: 22.2 KG/M2 | HEIGHT: 64 IN

## 2025-08-20 DIAGNOSIS — Z48.89 AFTERCARE FOLLOWING SURGERY: Primary | ICD-10-CM

## 2025-08-20 DIAGNOSIS — Z98.890 STATUS POST ARTHROSCOPY OF RIGHT SHOULDER: ICD-10-CM

## 2025-08-20 PROCEDURE — 1159F MED LIST DOCD IN RCRD: CPT | Performed by: PHYSICIAN ASSISTANT

## 2025-08-20 PROCEDURE — 1125F AMNT PAIN NOTED PAIN PRSNT: CPT | Performed by: PHYSICIAN ASSISTANT

## 2025-08-20 PROCEDURE — 1160F RVW MEDS BY RX/DR IN RCRD: CPT | Performed by: PHYSICIAN ASSISTANT

## 2025-08-20 PROCEDURE — 3008F BODY MASS INDEX DOCD: CPT | Performed by: PHYSICIAN ASSISTANT

## 2025-08-20 PROCEDURE — 99024 POSTOP FOLLOW-UP VISIT: CPT | Performed by: PHYSICIAN ASSISTANT

## 2025-08-20 RX ORDER — HYDROCODONE BITARTRATE AND ACETAMINOPHEN 5; 325 MG/1; MG/1
1 TABLET ORAL EVERY 8 HOURS PRN
Qty: 20 TABLET | Refills: 0 | Status: SHIPPED | OUTPATIENT
Start: 2025-08-20

## 2025-08-24 DIAGNOSIS — E78.2 MIXED HYPERLIPIDEMIA: Primary | ICD-10-CM

## 2025-08-24 DIAGNOSIS — R73.03 PREDIABETES: ICD-10-CM

## 2025-08-24 DIAGNOSIS — E78.2 MIXED HYPERLIPIDEMIA: ICD-10-CM

## 2025-08-24 DIAGNOSIS — R63.4 UNINTENTIONAL WEIGHT LOSS: ICD-10-CM

## 2025-08-24 DIAGNOSIS — I10 ESSENTIAL HYPERTENSION, BENIGN: ICD-10-CM

## 2025-08-27 ENCOUNTER — PATIENT MESSAGE (OUTPATIENT)
Dept: ORTHOPEDICS CLINIC | Facility: CLINIC | Age: 70
End: 2025-08-27

## 2025-08-27 ENCOUNTER — PATIENT MESSAGE (OUTPATIENT)
Dept: FAMILY MEDICINE CLINIC | Facility: CLINIC | Age: 70
End: 2025-08-27

## 2025-08-27 DIAGNOSIS — Z98.890 STATUS POST ARTHROSCOPY OF RIGHT SHOULDER: ICD-10-CM

## 2025-08-27 RX ORDER — ATORVASTATIN CALCIUM 20 MG/1
20 TABLET, FILM COATED ORAL NIGHTLY
Qty: 90 TABLET | Refills: 1 | Status: SHIPPED | OUTPATIENT
Start: 2025-08-27

## 2025-08-27 RX ORDER — METFORMIN HYDROCHLORIDE 500 MG/1
1000 TABLET, EXTENDED RELEASE ORAL
Qty: 180 TABLET | Refills: 1 | Status: SHIPPED | OUTPATIENT
Start: 2025-08-27

## 2025-08-27 RX ORDER — HYDROCODONE BITARTRATE AND ACETAMINOPHEN 5; 325 MG/1; MG/1
1 TABLET ORAL 2 TIMES DAILY PRN
Qty: 20 TABLET | Refills: 0 | Status: SHIPPED | OUTPATIENT
Start: 2025-08-27

## (undated) DIAGNOSIS — M25.50 PAIN IN JOINT, MULTIPLE SITES: ICD-10-CM

## (undated) DIAGNOSIS — I10 ESSENTIAL HYPERTENSION, BENIGN: ICD-10-CM

## (undated) DEVICE — Device

## (undated) DEVICE — TUBING PMP L16FT DISP INFLOW

## (undated) DEVICE — WAND ARTHSCP 3.75MM TIP 5.25MM 90DEG

## (undated) DEVICE — REMOVER DURAPREP 3M

## (undated) DEVICE — SET ENDOSCP INSTR DISP FOR BIOTENODESIS SCR

## (undated) DEVICE — MARKER SKIN 2 TIP

## (undated) DEVICE — SUT MCRYL 3-0 27IN ABSRB UD 19MM PS-2 3/8

## (undated) DEVICE — BRACE SHLDR M FA L13.5-14.5IN UNIV AIRMESH

## (undated) DEVICE — SLEEVE COMPR MD KNEE LEN SGL USE KENDALL SCD

## (undated) DEVICE — CANNULA ARTHSCP L7CM DIA6MM TWST-IN

## (undated) DEVICE — GOWN SUR 2XL LEV 4 BLU W/ WHT V NK BND AERO

## (undated) DEVICE — DRAPE SURG L W60XL84IN SMS POLYPR U SHP FLM

## (undated) DEVICE — GLOVE SUR 8 SENSICARE PI PIP CRM PWD F

## (undated) DEVICE — SUT ETHLN 2-0 18IN FS NABSRB BLK 26MM 3/8 CIR

## (undated) DEVICE — LACTATED. R IRRIG 3000ML

## (undated) DEVICE — CANNULA ARTHSCP L7CM DIA8.25MM NOTCH TWST-IN

## (undated) DEVICE — PAIN TRAY: Brand: MEDLINE INDUSTRIES, INC.

## (undated) DEVICE — GLOVE SURG SENSICARE SZ 6-1/2

## (undated) DEVICE — SLEEVE TRAC SPANDEX LAT W/ 4IN COBAN

## (undated) DEVICE — GLOVE SUR 8.5 SENSICARE PI PIP GRN PWD F

## (undated) DEVICE — BANDAID COVERLET 1X3

## (undated) DEVICE — GLOVE SUR 6.5 SENSICARE PI PIP GRN PWD F

## (undated) DEVICE — SUT SUTTAPE L40IN BLU L36.6MM 1/2 CIR

## (undated) DEVICE — DRESSING ADAPTIC L16XW3IN OIL ADH

## (undated) DEVICE — COVER LT HNDL PLAS RIG 2 PER PK

## (undated) DEVICE — GLOVE SURG SENSICARE SZ 7-1/2

## (undated) DEVICE — GLOVE SUR 6.5 SENSICARE PI PIP CRM PWD F

## (undated) DEVICE — APPLICATOR PREP 26ML CHG 2% ISO ALC 70%

## (undated) DEVICE — NEEDLE SPINAL 22X3-1/2 BLK

## (undated) DEVICE — DRAPE SUR W70XL100IN STD SMS POLYPR FULL SHT

## (undated) NOTE — ED AVS SNAPSHOT
Mary Winters   MRN: [id number]    Department:  [facility] Emergency Department   Date of Visit:  3/8/2020           Disclosure     Insurance plans vary and the physician(s) referred by the ER may not be covered by your plan.  Please contac tell this physician (or your personal doctor if your instructions are to return to your personal doctor) about any new or lasting problems. The primary care or specialist physician will see patients referred from the [facility] Emergency Department.  Zachary Jacobson

## (undated) NOTE — LETTER
11/25/20        [address] [place] [phone]      Dear Cas Velázquez,    Our records indicate that you have outstanding lab work and or testing that was ordered for you and has not yet been completed:      LIPID PANEL       COMP M

## (undated) NOTE — LETTER
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     Sincerely,      Ellen Goncalves MD

## (undated) NOTE — LETTER
25    Orthopedic Surgery   Pre-Operative Clearance Request    Patient Name:   Ese Rodriguez             :   10/22/1955    Surgeon: Dr. Coombs             Date of Surgery: 25    Surgical Procedure: RIGHT SHOULDER ARTHROSCOPY ROTATOR CUFF REPAIR, SUBACROMIAL DECOMPRESSION AND DISTAL CLAVICLE EXCISION AND DEBRIDEMENT      MUST COMPLETE ALL OF THE FOLLOWING 2-3 WEEKS PRIOR TO YOUR SURGERY TO AVOID CANCELLATION, DUE TO THE RULE THEIR WILL BE NO EXCEPTIONS!      [x]  History and Physical        [x]  Medical  Clearance                     Required pre-op testing to be ordered: N/A                             **Please fax test results, H&P, and clearance to [phone] and to [facility] at [phone]**

## (undated) NOTE — ED AVS SNAPSHOT
Keyon Richard   MRN: [id number]    Department:  [facility] Emergency Department   Date of Visit:  3/22/2020           Disclosure     Insurance plans vary and the physician(s) referred by the ER may not be covered by your plan.  Please conta tell this physician (or your personal doctor if your instructions are to return to your personal doctor) about any new or lasting problems. The primary care or specialist physician will see patients referred from the [facility] Emergency Department.  Shanell Garcia

## (undated) NOTE — LETTER
10/07/21        [address] [place] [phone]      Dear Heather Flores,    Our records indicate that you have outstanding lab work and or testing that was ordered for you and has not yet been completed      LIPID PANEL       Comp Me

## (undated) NOTE — LETTER
12/03/20        [address] [place] [phone]      Dear Milton Morris,    Our records indicate that you have outstanding lab work and or testing that was ordered for you and has not yet been completed:        ALEJO SULLIVAN 2D+3D SCREENI

## (undated) NOTE — LETTER
July 15, 2020    Scott Goncalves  [address] [facility]      Dear Brendan Browne: The following are the results of your recent tests. Please review the list of test results.   Your result is the value on the left; we have also supplied the

## (undated) NOTE — LETTER
Date: 2022    Patient Name: Melinda Lefort  ( 10/22/1955)          To Whom it may concern: This letter has been written at the patient's request. The above patient was seen at the [facility] for treatment of a medical condition. The patient may return to work on 11 with the following limitations: none.         Sincerely,      Krishna De La Cruz MD

## (undated) NOTE — LETTER
20          Néstor Menezes  :  10/22/1955      To Whom It May Concern: This patient is being treated by our office. Work status: full duty as tolerated on 2020.   If this office may be of further assistance, please do no